# Patient Record
Sex: MALE | Race: WHITE | Employment: OTHER | URBAN - NONMETROPOLITAN AREA
[De-identification: names, ages, dates, MRNs, and addresses within clinical notes are randomized per-mention and may not be internally consistent; named-entity substitution may affect disease eponyms.]

---

## 2018-07-16 ENCOUNTER — OFFICE VISIT (OUTPATIENT)
Dept: UROLOGY | Age: 68
End: 2018-07-16
Payer: MEDICARE

## 2018-07-16 VITALS
SYSTOLIC BLOOD PRESSURE: 135 MMHG | HEART RATE: 89 BPM | WEIGHT: 172 LBS | TEMPERATURE: 98.1 F | BODY MASS INDEX: 24.62 KG/M2 | DIASTOLIC BLOOD PRESSURE: 77 MMHG | HEIGHT: 70 IN

## 2018-07-16 DIAGNOSIS — R97.20 ELEVATED PSA: Primary | ICD-10-CM

## 2018-07-16 LAB
BILIRUBIN, POC: 0
BLOOD URINE, POC: NORMAL
CLARITY, POC: CLEAR
COLOR, POC: YELLOW
GLUCOSE URINE, POC: POSITIVE
KETONES, POC: NORMAL
LEUKOCYTE EST, POC: NORMAL
NITRITE, POC: NORMAL
PH, POC: 5.5
PROTEIN, POC: NORMAL
SPECIFIC GRAVITY, POC: 1.01
UROBILINOGEN, POC: 0.2

## 2018-07-16 PROCEDURE — 4004F PT TOBACCO SCREEN RCVD TLK: CPT | Performed by: UROLOGY

## 2018-07-16 PROCEDURE — 4040F PNEUMOC VAC/ADMIN/RCVD: CPT | Performed by: UROLOGY

## 2018-07-16 PROCEDURE — 81003 URINALYSIS AUTO W/O SCOPE: CPT | Performed by: UROLOGY

## 2018-07-16 PROCEDURE — G8420 CALC BMI NORM PARAMETERS: HCPCS | Performed by: UROLOGY

## 2018-07-16 PROCEDURE — 99999 PR ECHO,TRANSRECTAL: CPT | Performed by: UROLOGY

## 2018-07-16 PROCEDURE — 99204 OFFICE O/P NEW MOD 45 MIN: CPT | Performed by: UROLOGY

## 2018-07-16 PROCEDURE — G8428 CUR MEDS NOT DOCUMENT: HCPCS | Performed by: UROLOGY

## 2018-07-16 PROCEDURE — 1123F ACP DISCUSS/DSCN MKR DOCD: CPT | Performed by: UROLOGY

## 2018-07-16 PROCEDURE — 3017F COLORECTAL CA SCREEN DOC REV: CPT | Performed by: UROLOGY

## 2018-07-16 PROCEDURE — 1101F PT FALLS ASSESS-DOCD LE1/YR: CPT | Performed by: UROLOGY

## 2018-07-16 RX ORDER — LEVOFLOXACIN 500 MG/1
500 TABLET, FILM COATED ORAL DAILY
Qty: 4 TABLET | Refills: 0 | Status: SHIPPED | OUTPATIENT
Start: 2018-07-16 | End: 2018-07-20

## 2018-07-16 RX ORDER — LISINOPRIL 10 MG/1
TABLET ORAL
COMMUNITY

## 2018-07-16 RX ORDER — SYRINGE-NEEDLE,INSULIN,0.5 ML 31 GX5/16"
SYRINGE, EMPTY DISPOSABLE MISCELLANEOUS
Refills: 0 | Status: ON HOLD | COMMUNITY
Start: 2018-05-22 | End: 2018-12-04 | Stop reason: ALTCHOICE

## 2018-07-16 RX ORDER — INSULIN GLARGINE 100 [IU]/ML
INJECTION, SOLUTION SUBCUTANEOUS
COMMUNITY
Start: 2018-05-17 | End: 2018-09-24 | Stop reason: ALTCHOICE

## 2018-07-16 ASSESSMENT — ENCOUNTER SYMPTOMS
BLURRED VISION: 0
SINUS PAIN: 0
EYE PAIN: 0
WHEEZING: 0
BACK PAIN: 0
SHORTNESS OF BREATH: 0
ABDOMINAL PAIN: 0
VOMITING: 0

## 2018-07-16 NOTE — LETTER
[facility]  [address] [place] [address] Drive, [address] [facility] [address]  Phone: [phone]  Fax: [phone]    Danielle Dorman MD        July 16, 2018     [address] [address]      Patient: Lamon Riedel   MR Number: [id number]   YOB: 1950   Date of Visit: 7/16/2018       Dear Provider: Thank you for referring Roge Sr to me for evaluation. Below are the relevant portions of my assessment and plan of care. If you have questions, please do not hesitate to call me. I look forward to following ALVARADO along with you.     Sincerely,        Danielle Dorman MD

## 2018-08-06 ENCOUNTER — HOSPITAL ENCOUNTER (OUTPATIENT)
Age: 68
Setting detail: SPECIMEN
Discharge: HOME | End: 2018-08-06
Payer: MEDICARE

## 2018-08-06 ENCOUNTER — PROCEDURE VISIT (OUTPATIENT)
Dept: UROLOGY | Age: 68
End: 2018-08-06
Payer: MEDICARE

## 2018-08-06 VITALS — BODY MASS INDEX: 24.34 KG/M2 | TEMPERATURE: 96.3 F | WEIGHT: 170 LBS | HEIGHT: 70 IN

## 2018-08-06 DIAGNOSIS — R97.20 ELEVATED PSA: Primary | ICD-10-CM

## 2018-08-06 PROCEDURE — 99999 SURGICAL PATHOLOGY: CPT | Performed by: UROLOGY

## 2018-08-06 PROCEDURE — 99999 PR SONO GUIDE NEEDLE BIOPSY: CPT | Performed by: UROLOGY

## 2018-08-06 PROCEDURE — 76872 US TRANSRECTAL: CPT | Performed by: UROLOGY

## 2018-08-06 PROCEDURE — 96372 THER/PROPH/DIAG INJ SC/IM: CPT | Performed by: UROLOGY

## 2018-08-06 PROCEDURE — 88305 TISSUE EXAM BY PATHOLOGIST: CPT

## 2018-08-06 PROCEDURE — 55700 PR BIOPSY OF PROSTATE,NEEDLE/PUNCH: CPT | Performed by: UROLOGY

## 2018-08-06 PROCEDURE — 99999 PR OFFICE/OUTPT VISIT,PROCEDURE ONLY: CPT | Performed by: UROLOGY

## 2018-08-06 RX ORDER — GENTAMICIN SULFATE 40 MG/ML
80 INJECTION, SOLUTION INTRAMUSCULAR; INTRAVENOUS ONCE
Status: COMPLETED | OUTPATIENT
Start: 2018-08-06 | End: 2018-08-06

## 2018-08-06 RX ADMIN — GENTAMICIN SULFATE 80 MG: 40 INJECTION, SOLUTION INTRAMUSCULAR; INTRAVENOUS at 07:48

## 2018-08-06 NOTE — PROGRESS NOTES
Ramiro Byrd is a 70 y.o. male who presents today   Chief Complaint   Patient presents with    Elevated PSA     im here today for my prostate bx elevated psa          Elevated PSA:  Patient is here today for history of an elevated PSA. HPI  Elevated PSA:  Patient is here today for an elevated PSA. His last several PSA values are as follows:  PSA on 7/7/18 was 7.6  Family History of prostate cancer? no  Recent history of urinary tract infection/prostatitis? no  Recent catheterization? no  Recent acute urinary retention? no  Previous prostate biopsy? no  Lower urinary tract symptoms: No significant lower urinary tract symptoms. He states he's had some prostate checks years ago but doesn't recall ever being told he had an abnormal prostate or enlarged prostate or elevated PSA       PROSTATE U/S AND BIOPSY  As per my instructions, the patient took an antibiotic Beginning 1 day prior to this procedure. To be continued daily until 2 days post biopsy. Gentamicin 80mg IM was given today. He also had a Fleets enema. Surgeon: Jessica Arevalo MD  8/6/18  Indications for exam: Elevated PSA  Anesthesia: 1% Lidocaine periprostatic block bilaterally at junction of base of prostate and seminal vesicle, and apex   Ultrasound Findings:  Seminal Vesicles: intact bilaterally  Prostate Measurement: 22.9 grams PSAD 0.33  Transitional Zone: Normal echogenic structure  Peripheral Zone: Subtle hypoechoic change involving the left base and left mid gland laterally and anterior    Bladder: Minimal postvoid residual  Biopsy: Trans Rectal Ultra Sound was used for Needle Guidance to direct the location of the needle for biopsy. Biopsy cores were taken from the left and right  lobe in a sequential fashion using the standard 12 core template (lateral base; base; lateral mid; mid; lateral apex; apex). Six Biopsy were taken from the right and 6  were taken from the left. All specimens were sent for pathological examination.     Biopsy

## 2018-08-10 ENCOUNTER — TELEPHONE (OUTPATIENT)
Dept: UROLOGY | Age: 68
End: 2018-08-10

## 2018-08-10 DIAGNOSIS — C61 PROSTATE CANCER (HCC): Primary | ICD-10-CM

## 2018-08-10 NOTE — TELEPHONE ENCOUNTER
Attempted to call patient with results of his prostate biopsy. It did show cancer. there is no answer.   I could not leave a voice message on his mailbox

## 2018-08-20 ENCOUNTER — HOSPITAL ENCOUNTER (OUTPATIENT)
Dept: CT IMAGING | Age: 68
Discharge: HOME | End: 2018-08-20
Payer: MEDICARE

## 2018-08-20 ENCOUNTER — HOSPITAL ENCOUNTER (OUTPATIENT)
Dept: NUCLEAR MEDICINE | Age: 68
Discharge: HOME | End: 2018-08-22
Payer: MEDICARE

## 2018-08-20 ENCOUNTER — HOSPITAL ENCOUNTER (OUTPATIENT)
Dept: GENERAL RADIOLOGY | Age: 68
Discharge: HOME | End: 2018-08-20
Payer: MEDICARE

## 2018-08-20 ENCOUNTER — TELEPHONE (OUTPATIENT)
Dept: UROLOGY | Age: 68
End: 2018-08-20

## 2018-08-20 DIAGNOSIS — C61 PROSTATE CANCER (HCC): ICD-10-CM

## 2018-08-20 LAB
ALBUMIN SERPL-MCNC: 4.6 G/DL (ref 3.5–5.2)
ALP BLD-CCNC: 96 U/L (ref 40–130)
ALT SERPL-CCNC: 15 U/L (ref 5–41)
ANION GAP SERPL CALCULATED.3IONS-SCNC: 14 MMOL/L (ref 7–19)
AST SERPL-CCNC: 10 U/L (ref 5–40)
BILIRUB SERPL-MCNC: 0.6 MG/DL (ref 0.2–1.2)
BUN BLDV-MCNC: 11 MG/DL (ref 8–23)
CALCIUM SERPL-MCNC: 10.8 MG/DL (ref 8.8–10.2)
CHLORIDE BLD-SCNC: 95 MMOL/L (ref 98–111)
CO2: 26 MMOL/L (ref 22–29)
CREAT SERPL-MCNC: 0.8 MG/DL (ref 0.5–1.2)
GFR NON-AFRICAN AMERICAN: >60
GFR NON-AFRICAN AMERICAN: >60
GLUCOSE BLD-MCNC: 598 MG/DL (ref 74–109)
PERFORMED ON: NORMAL
POC CREATININE: 0.6 MG/DL (ref 0.3–1.3)
POC SAMPLE TYPE: NORMAL
POTASSIUM SERPL-SCNC: 5.1 MMOL/L (ref 3.5–5)
SODIUM BLD-SCNC: 135 MMOL/L (ref 136–145)
TOTAL PROTEIN: 7.6 G/DL (ref 6.6–8.7)

## 2018-08-20 PROCEDURE — A9561 TC99M OXIDRONATE: HCPCS | Performed by: UROLOGY

## 2018-08-20 PROCEDURE — 74178 CT ABD&PLV WO CNTR FLWD CNTR: CPT

## 2018-08-20 PROCEDURE — 71046 X-RAY EXAM CHEST 2 VIEWS: CPT

## 2018-08-20 PROCEDURE — 82565 ASSAY OF CREATININE: CPT

## 2018-08-20 PROCEDURE — 3430000000 HC RX DIAGNOSTIC RADIOPHARMACEUTICAL: Performed by: UROLOGY

## 2018-08-20 PROCEDURE — 6360000004 HC RX CONTRAST MEDICATION: Performed by: UROLOGY

## 2018-08-20 PROCEDURE — 78306 BONE IMAGING WHOLE BODY: CPT

## 2018-08-20 RX ADMIN — TECHNETIUM TC 99M OXIDRONATE 20 MILLICURIE: 3.15 INJECTION, POWDER, LYOPHILIZED, FOR SOLUTION INTRAVENOUS at 12:16

## 2018-08-20 RX ADMIN — IOPAMIDOL 75 ML: 755 INJECTION, SOLUTION INTRAVENOUS at 09:30

## 2018-08-20 NOTE — TELEPHONE ENCOUNTER
Was instructed by dr Eyad Gill to notify patient of his elevated glucose and the ct findings of thrombus of the left ventricle. Patient was instructed to go to the ED prompt to get his glucose level down. I also notified patients listed pcp of the findings and faxed results to [phone]. Also referred patient to [facility] cardio. Requested stat appt. They scheduled patient for Soonest opening on 9/24/18 at 9am (8:30 arrival) w/ joseline wilkerson. They instructed that we order for patient to do echo to confirm findings.

## 2018-08-22 DIAGNOSIS — I24.0 ACUTE THROMBUS OF LEFT VENTRICLE (HCC): Primary | ICD-10-CM

## 2018-08-24 ENCOUNTER — INITIAL CONSULT (OUTPATIENT)
Dept: UROLOGY | Age: 68
End: 2018-08-24
Payer: MEDICARE

## 2018-08-24 VITALS
SYSTOLIC BLOOD PRESSURE: 136 MMHG | WEIGHT: 168 LBS | DIASTOLIC BLOOD PRESSURE: 70 MMHG | TEMPERATURE: 96.3 F | HEIGHT: 71 IN | BODY MASS INDEX: 23.52 KG/M2 | HEART RATE: 80 BPM

## 2018-08-24 DIAGNOSIS — C61 PROSTATE CANCER (HCC): Primary | ICD-10-CM

## 2018-08-24 DIAGNOSIS — I24.0 LEFT VENTRICULAR APICAL THROMBUS WITHOUT MI (HCC): ICD-10-CM

## 2018-08-24 LAB
BACTERIA URINE, POC: ABNORMAL
BILIRUBIN URINE: 0 MG/DL
BLOOD, URINE: POSITIVE
CASTS URINE, POC: ABNORMAL
CLARITY: CLEAR
COLOR: YELLOW
CRYSTALS URINE, POC: ABNORMAL
EPI CELLS URINE, POC: ABNORMAL
GLUCOSE URINE: POSITIVE
KETONES, URINE: NEGATIVE
LEUKOCYTE EST, POC: ABNORMAL
NITRITE, URINE: NEGATIVE
PH UA: 5.5 (ref 4.5–8)
PROTEIN UA: NEGATIVE
RBC URINE, POC: ABNORMAL
SPECIFIC GRAVITY UA: 1.01 (ref 1–1.03)
UROBILINOGEN, URINE: NORMAL
WBC URINE, POC: ABNORMAL
YEAST URINE, POC: ABNORMAL

## 2018-08-24 PROCEDURE — 3017F COLORECTAL CA SCREEN DOC REV: CPT | Performed by: UROLOGY

## 2018-08-24 PROCEDURE — 4004F PT TOBACCO SCREEN RCVD TLK: CPT | Performed by: UROLOGY

## 2018-08-24 PROCEDURE — G8427 DOCREV CUR MEDS BY ELIG CLIN: HCPCS | Performed by: UROLOGY

## 2018-08-24 PROCEDURE — 4040F PNEUMOC VAC/ADMIN/RCVD: CPT | Performed by: UROLOGY

## 2018-08-24 PROCEDURE — 99215 OFFICE O/P EST HI 40 MIN: CPT | Performed by: UROLOGY

## 2018-08-24 PROCEDURE — 1123F ACP DISCUSS/DSCN MKR DOCD: CPT | Performed by: UROLOGY

## 2018-08-24 PROCEDURE — G8420 CALC BMI NORM PARAMETERS: HCPCS | Performed by: UROLOGY

## 2018-08-24 PROCEDURE — G8599 NO ASA/ANTIPLAT THER USE RNG: HCPCS | Performed by: UROLOGY

## 2018-08-24 PROCEDURE — 1101F PT FALLS ASSESS-DOCD LE1/YR: CPT | Performed by: UROLOGY

## 2018-08-24 PROCEDURE — 81001 URINALYSIS AUTO W/SCOPE: CPT | Performed by: UROLOGY

## 2018-08-24 ASSESSMENT — ENCOUNTER SYMPTOMS
EYE PAIN: 0
WHEEZING: 0
ABDOMINAL PAIN: 0
VOMITING: 0
SHORTNESS OF BREATH: 0
BLURRED VISION: 0
SINUS PAIN: 0
BACK PAIN: 0

## 2018-08-24 NOTE — PROGRESS NOTES
Deon Lopez is a 70 y.o. male who presents today   Chief Complaint   Patient presents with    Prostate Cancer     im here today for my prostate  cancer consult             Prostate Cancer:  Patient is here today for prostate cancer which was first diagnosed on 08/06/16. His last several PSA values are as follows:  PSA was 7.6 ng/ML done on 07/07/18  His prostate volume was 22.9 grams. , PSAD 0.33  He had a prostate biopsy consisting of a total of 12 cores with 5 positive cores. He has bilateral disease with a Sheba score of 4+4 = 8 in only one core. The majority were 3+3 = 6 with 1 core being a 3+4 = 7 . Location of the the Cancer: Right lateral base, right lateral mid, right mid, right base, and left apex  His clinical TNM stage was: T1c  His pathological TNM stage was: T2c  The patient has a staging CT and bone scan. It showed no distant metastasis  Previous treatment of prostate cancer: none  Patient has Normal erectile function no            Prostate Cancer Treatment Options  I have discussed in great detail with the patient the natural history, diagnosis and treatment of prostate cancer. I explained the [place] grading system, Staging system, correlation of disease with PSA levels, and  as well as the various treatments available for prostate cancer. I discussed the following options:  1. Watchful waiting / Active surveillance. I told that this approach was appropriate for older patients, patients with a life expectancy of 10 years or less and patients with low grade, low volume disease. This approach will require serial ALICJA's, PSA's and possibly repeat prostate biopsy to check for grade or stage progression. 2.  Hormonal Therapy. I discussed the role of hormonal therapy in the form of LHRH agonists or antagonists such as Lupron, etc. Or surgical castration in the form of bilateral orchiectomy.   The patient was told that this is primarily used in patients with teresa or metastatic disease or in prostatic  parenchyma. D.  Prostate, right base needle biopsy: Prostatic adenocarcinoma  (Sheba's grade 3+4 = 7), measuring 0.3 cm in greatest dimension and  occupying approximately 25% of the evaluated core. E.  Prostate, right mid needle biopsy: Prostatic adenocarcinoma  ([place]'s grade 4+4 equal 8), measuring 0.3 cm in greatest dimension and  occupying approximately 25% of the evaluated core. F.  Prostate, right [place] needle biopsy: Benign prostatic parenchyma. G.  Prostate, left lateral base needle biopsy: Benign prostatic  parenchyma. H.  Prostate, left lateral mid needle biopsy: Benign prostatic  parenchyma. I.  Prostate, left lateral apex needle biopsy: Benign prostatic  parenchyma. J.  Prostate, left base needle biopsy: Benign prostatic parenchyma. K.  Prostate, left mid needle biopsy: Benign prostatic parenchyma. L.  Prostate, left apex needle biopsy: Prostatic adenocarcinoma  (Sheba's grade 3+3 equal 6), measuring 0.1 cm in greatest dimension and  occupying less than 5% of the evaluated core. PROSTATE GLAND:  Needle biopsy    TUMOR    Histologic Grade    Sheba Pattern    [place] pattern (specify)    Primary (Predominant) Pattern:  Grade 3    Secondary (Worst Remaining) Pattern:  Grade 4  Total Sheba Score:  7  EXTENT    Tumor Quantitation    Number of Cores Positive:  5    Total Number of Cores:  12    Proportion (%) of prostatic tissue involved by tumor:  20  Periprostatic Fat Invasion:  Not identified  Seminal Vesicle Invasion:  Not identified  ACCESSORY FINDINGS  ADDITIONAL NON TUMOR FINDINGS       CBG/CBG    PREOP DIAGNOSIS:  R97.20 ELEVATED PSA  PSA 7.6       IMAGING:   Bone scan done on 08/20/18 showed no evidence of osteoblastic metastasis. Chest x-ray done on 08/20/18 showed no acute cardiopulmonary process or metastasis. Abdominal pelvic CT scan done on 08/20/18 shows no retroperitoneal or pelvic adenopathy or evidence of any abdominal metastasis.  However he does have calcific density in the tail of pancreas he has a prior history of prior pancreatitis. In addition he was found to have a hypodense area in the left ventricle appears to be consistent with a thrombus. 1. Prostate cancer ([address]  Appears to be ordering confined. However he does have high risk disease because one core showed Sheba's 4+4 = 8. No evidence of distant metastasis. I spent 1 hour face-to-face time with him and his family his daughter and wife. We discussed options for management for clinically localized prostate cancer. We discussed the results of his biopsy the pathology report and results of his imaging studies. He is leaning to have surgery however we need to find out what his echo shows regarding this left ventricular apical thrombus. In addition he has poor control of his diabetes he says generally check his blood sugars at home and I have encouraged him to see his PCP to see if they can do further adjustments on his insulin and his medications to get better control his sugars were prior to considering surgery. We also compared contrast opened versus robotic surgery and radiation with or without hormonal therapy. All questions and concerns were asked and answered. - POCT Urinalysis Dipstick w/ Micro (Auto)    2. 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 We'll try to see if he needed an earlier if the echo confirms the CT findings. Specially since he's considering surgery      Orders Placed This Encounter   Procedures    POCT Urinalysis Dipstick w/ Micro (Auto)        Return for [facility] call patient with results.

## 2018-08-24 NOTE — LETTER
[facility]  [address] [place] [address] Drive, [address] [id number]  Phone: [phone]  Fax: [phone]    Mehdi Kim MD        August 24, 2018     [address] [facility]      Patient: Leonie Pratt   MR Number: [id number]   YOB: 1950   Date of Visit: 8/24/2018       Dear Provider: Thank you for referring Waldemar Stearns to me for evaluation. Below are the relevant portions of my assessment and plan of care. If you have questions, please do not hesitate to call me. I look forward to following Alyse Welsh along with you.     Sincerely,        Mehdi Kim MD

## 2018-08-28 ENCOUNTER — HOSPITAL ENCOUNTER (OUTPATIENT)
Dept: NON INVASIVE DIAGNOSTICS | Age: 68
Discharge: HOME | End: 2018-08-28
Payer: MEDICARE

## 2018-08-28 DIAGNOSIS — I24.0 ACUTE THROMBUS OF LEFT VENTRICLE (HCC): ICD-10-CM

## 2018-08-28 LAB
LV EF: 38 %
LVEF MODALITY: NORMAL

## 2018-08-28 PROCEDURE — 93306 TTE W/DOPPLER COMPLETE: CPT

## 2018-08-30 ENCOUNTER — TELEPHONE (OUTPATIENT)
Dept: UROLOGY | Age: 68
End: 2018-08-30

## 2018-08-30 NOTE — TELEPHONE ENCOUNTER
Scheduled patient w/ joseline wilkreson for thrombus of the left ventricle. Dr Bradley Dobson requested sooner appointment, but we were asked to order echo first to confirm findings. Echo does not show thrombus of the left ventricle, but dr Bradley Dobson is still requesting patient be seen sooner to be cleared for surgery.

## 2018-09-06 NOTE — TELEPHONE ENCOUNTER
Called patient to see about moving his cardio appt up. He said he does not want to move it up at this time. He will keep it as scheduled. He has been seeing his pcp to try to get his blood sugar under control and that is all he is concerned about at this time. Is in no hurry for surgery. Will get in touch with us after his appt w/ cardio to make appt with dr Andrew Mathews.

## 2018-09-10 ENCOUNTER — TELEPHONE (OUTPATIENT)
Dept: CARDIOLOGY | Age: 68
End: 2018-09-10

## 2018-09-24 ENCOUNTER — OFFICE VISIT (OUTPATIENT)
Dept: CARDIOLOGY | Age: 68
End: 2018-09-24
Payer: MEDICARE

## 2018-09-24 VITALS
WEIGHT: 166 LBS | SYSTOLIC BLOOD PRESSURE: 118 MMHG | RESPIRATION RATE: 18 BRPM | DIASTOLIC BLOOD PRESSURE: 76 MMHG | HEIGHT: 70 IN | HEART RATE: 80 BPM | BODY MASS INDEX: 23.77 KG/M2

## 2018-09-24 DIAGNOSIS — Z01.818 PREOPERATIVE CLEARANCE: ICD-10-CM

## 2018-09-24 DIAGNOSIS — I51.89 DIASTOLIC DYSFUNCTION: ICD-10-CM

## 2018-09-24 DIAGNOSIS — R06.09 DOE (DYSPNEA ON EXERTION): ICD-10-CM

## 2018-09-24 DIAGNOSIS — I51.9 LEFT VENTRICULAR DYSFUNCTION: Primary | ICD-10-CM

## 2018-09-24 DIAGNOSIS — Z86.39 HISTORY OF DIABETES MELLITUS: ICD-10-CM

## 2018-09-24 DIAGNOSIS — I10 ESSENTIAL HYPERTENSION: ICD-10-CM

## 2018-09-24 PROCEDURE — 4040F PNEUMOC VAC/ADMIN/RCVD: CPT | Performed by: NURSE PRACTITIONER

## 2018-09-24 PROCEDURE — 1123F ACP DISCUSS/DSCN MKR DOCD: CPT | Performed by: NURSE PRACTITIONER

## 2018-09-24 PROCEDURE — 93000 ELECTROCARDIOGRAM COMPLETE: CPT | Performed by: NURSE PRACTITIONER

## 2018-09-24 PROCEDURE — G8599 NO ASA/ANTIPLAT THER USE RNG: HCPCS | Performed by: NURSE PRACTITIONER

## 2018-09-24 PROCEDURE — 1101F PT FALLS ASSESS-DOCD LE1/YR: CPT | Performed by: NURSE PRACTITIONER

## 2018-09-24 PROCEDURE — 4004F PT TOBACCO SCREEN RCVD TLK: CPT | Performed by: NURSE PRACTITIONER

## 2018-09-24 PROCEDURE — G8427 DOCREV CUR MEDS BY ELIG CLIN: HCPCS | Performed by: NURSE PRACTITIONER

## 2018-09-24 PROCEDURE — 3017F COLORECTAL CA SCREEN DOC REV: CPT | Performed by: NURSE PRACTITIONER

## 2018-09-24 PROCEDURE — G8420 CALC BMI NORM PARAMETERS: HCPCS | Performed by: NURSE PRACTITIONER

## 2018-09-24 PROCEDURE — 99204 OFFICE O/P NEW MOD 45 MIN: CPT | Performed by: NURSE PRACTITIONER

## 2018-09-24 RX ORDER — GLIPIZIDE 5 MG/1
10 TABLET ORAL DAILY
Status: ON HOLD | COMMUNITY
Start: 2018-09-14 | End: 2021-01-01 | Stop reason: HOSPADM

## 2018-09-24 NOTE — PATIENT INSTRUCTIONS
minutes. Your blood pressure will also be monitored throughout the exercise portion. [place] through the exercise portion, a second round of radioactive tracer is injected into your body. Your heart rate and EKG will be monitored for another few minutes after administering the drug. Test Preparation:     Bring a list of your current medications. Do not take any of your medications the morning of the test, but bring all morning medications with you as you will take them after the stress portion of the test is completed.  No caffeine 12 hours prior to the testing. This includes: coffee, pop/soda, chocolate, cold medications, etc.  Any product that might contain caffeine.  No nicotine or alcohol 12 hours prior to your test.    Nothing to eat or drink 4-6 hours prior to appointment time. It is okay to drink small amounts of water during the four hours prior to the test.   Nitroglycerin patches must be taken off 1 hour before testing.  Wear comfortable clothing.  Please refrain from any strenuous exercise or activities the day before your test, or the day of your test.   The Nuclear Lexiscan Stress test takes about 2 ½ to 3 hours to complete. If for any reason you are unable to keep this appointment, please contact Outpatient Scheduling, [phone], as soon as possible to reschedule. What is a [facility]? Gilmer Mace may be ordered by for those unable to exercise enough to increase blood flow to their heart during an exercise stress test.  [facility] is used to help produce images of the heart for diagnosing blocked heart arteries. This test is not invasive. You will be awake during the entire test.    Your heart rhythm, blood pressure and oxygen level will be monitored during the test.  A small catheter will be placed in an arm vein. [facility] is injected through the catheter into your bloodstream for 10 seconds followed immediately by saline solution to clear the line.    [facility] doctor recommends aspirin, take the amount directed each day. Make sure you take aspirin and not another kind of pain reliever, such as acetaminophen (Tylenol). If you take ibuprofen (such as Advil or Motrin) for other problems, take aspirin at least 2 hours before taking ibuprofen. When should you call for help? Call 911 if you have symptoms of a heart attack. These may include:    · Chest pain or pressure, or a strange feeling in the chest.     · Sweating.     · Shortness of breath.     · Pain, pressure, or a strange feeling in the back, neck, jaw, or upper belly or in one or both shoulders or arms.     · Lightheadedness or sudden weakness.     · A fast or irregular heartbeat.    After you call 911, the  may tell you to chew 1 adult-strength or 2 to 4 low-dose aspirin. Wait for an ambulance. Do not try to drive yourself.   Watch closely for changes in your health, and be sure to contact your doctor if you have any problems. Where can you learn more? Go to [email or web address] org and sign in to your Ins[employer] account. Enter Z843 in the [facility] box to learn more about \"A Healthy Heart: Care Instructions. \"     If you do not have an account, please click on the \"Sign Up Now\" link. Current as of: December 6, 2017  Content Version: 11.7  © 9200-1212 [employer]. Care instructions adapted under license by [facility] [employer] [facility] ([facility]). If you have questions about a medical condition or this instruction, always ask your healthcare professional. [address] [place] [address] any warranty or liability for your use of this information.

## 2018-09-24 NOTE — PROGRESS NOTES
Normal     rbc urine, poc 0-1`     wbc urine, poc      bacteria urine, poc      yeast urine, poc      casts urine, poc      epi cells urine, poc      crystals urine, poc       Plan  Previous cardiac history and records reviewed with Dr. Braeden Jara. Continue current medications as prescribed. Lexiscan rx to be scheduled. Continue to follow up with primary care provider for non cardiac medical problems. Call the office with any problems, questions or concerns at [phone]. Follow up as scheduled with your cardiologist - Dr. Braeden Jara. The following educational material has been included in this after visit summary for your review: heart health.     Additional instructions:  Coronary artery disease risk factors you can control: Smoking, high blood pressure, high cholesterol, diabetes, being overweight, lack of exercise and stress. Continue heart healthy diet. Take medications as directed. Exercise as tolerated. Strive for 15 minutes of exercise most days of the week. If asked to keep a blood pressure log, do so for 2 weeks. Call the office to report readings at [phone]. Blood pressure goal is 140/90 or less. If you are a diabetic, the goal is 130/80 or less. If you are taking cholesterol lowering medications, it is recommended that lab work be checked annually. Always keep a current medication list. Bring your medications to every office visit.      Lexiscan Nuclear Stress Test   Date/Time: to be scheduled. [place] at the [facility] and [address] [place] [address] located on the first floor of  [facility] through hospital main entrance and turn immediately to your left. Test Description:  There are two parts to a Lexiscan stress test: the rest portion and the exercise portion. For the rest portion, a radioactive tracer is injected into your arm through the IV. After 30 to 60 minutes, the process of imaging will begin.   A nuclear camera will be placed on your chest area and images are taken for the next 15 to 20 minutes. For the exercise portion, a nurse will attach EKG electrodes to your chest to monitor your heart rate. The drug Darral Ducking is administered to simulate stress on the heart. Your heart rhythm will then be monitored for the next few minutes. Your blood pressure will also be monitored throughout the exercise portion. [place] through the exercise portion, a second round of radioactive tracer is injected into your body. Your heart rate and EKG will be monitored for another few minutes after administering the drug. Test Preparation:     Bring a list of your current medications. Do not take any of your medications the morning of the test, but bring all morning medications with you as you will take them after the stress portion of the test is completed.  No caffeine 12 hours prior to the testing. This includes: coffee, pop/soda, chocolate, cold medications, etc.  Any product that might contain caffeine.  No nicotine or alcohol 12 hours prior to your test.    Nothing to eat or drink 4-6 hours prior to appointment time. It is okay to drink small amounts of water during the four hours prior to the test.   Nitroglycerin patches must be taken off 1 hour before testing.  Wear comfortable clothing.  Please refrain from any strenuous exercise or activities the day before your test, or the day of your test.   The Nuclear Lexiscan Stress test takes about 2 ½ to 3 hours to complete. If for any reason you are unable to keep this appointment, please contact Outpatient Scheduling, [phone], as soon as possible to reschedule. What is a Darral Ducking? Kathya Brewer may be ordered by for those unable to exercise enough to increase blood flow to their heart during an exercise stress test.  Darral Ducking is used to help produce images of the heart for diagnosing blocked heart arteries. This test is not invasive.   You will be awake during the entire test.    Your heart rhythm, blood pressure and oxygen level will be monitored during the test.  A small catheter will be placed in an arm vein. Blanca Jean Carlos is injected through the catheter into your bloodstream for 10 seconds followed immediately by saline solution to clear the line. Blanca Jean Carlos dilates the heart arteries to allow increased blood flow to the heart. 10-20 seconds after the saline solution, a small dose of radioactive isotope imaging tracer is injected into the catheter. After the tracer is absorbed in your system and distributed to the heart arteries, a special camera will take detailed pictures of your heart. The pictures will show how well the blood flows into your heart and if there are any areas of blockage. While you lie on your back with your arms above your head, the camera will take pictures for about 20-40 minutes.     One set of images will be taken when the Blanca Jean Carlos is active in your system, and a second set of images will be taken when you're considered at rest.         LO Blackman

## 2018-10-05 ENCOUNTER — TELEPHONE (OUTPATIENT)
Dept: CARDIOLOGY | Age: 68
End: 2018-10-05

## 2018-10-24 PROBLEM — Z01.818 PREOPERATIVE CLEARANCE: Status: RESOLVED | Noted: 2018-09-24 | Resolved: 2018-10-24

## 2018-11-01 ENCOUNTER — OFFICE VISIT (OUTPATIENT)
Dept: CARDIOLOGY | Age: 68
End: 2018-11-01
Payer: MEDICARE

## 2018-11-01 VITALS
HEART RATE: 81 BPM | WEIGHT: 167 LBS | HEIGHT: 71 IN | DIASTOLIC BLOOD PRESSURE: 80 MMHG | BODY MASS INDEX: 23.38 KG/M2 | SYSTOLIC BLOOD PRESSURE: 128 MMHG

## 2018-11-01 DIAGNOSIS — R06.09 DOE (DYSPNEA ON EXERTION): ICD-10-CM

## 2018-11-01 DIAGNOSIS — I10 ESSENTIAL HYPERTENSION: Primary | ICD-10-CM

## 2018-11-01 DIAGNOSIS — I51.9 LEFT VENTRICULAR DYSFUNCTION: ICD-10-CM

## 2018-11-01 PROCEDURE — 93000 ELECTROCARDIOGRAM COMPLETE: CPT | Performed by: INTERNAL MEDICINE

## 2018-11-01 PROCEDURE — 99214 OFFICE O/P EST MOD 30 MIN: CPT | Performed by: INTERNAL MEDICINE

## 2018-11-01 ASSESSMENT — ENCOUNTER SYMPTOMS
SHORTNESS OF BREATH: 0
EYES NEGATIVE: 1
VOMITING: 0
RESPIRATORY NEGATIVE: 1
DIARRHEA: 0
GASTROINTESTINAL NEGATIVE: 1
NAUSEA: 0

## 2018-11-02 ENCOUNTER — TELEPHONE (OUTPATIENT)
Dept: CARDIOLOGY | Age: 68
End: 2018-11-02

## 2018-11-20 ENCOUNTER — HOSPITAL ENCOUNTER (OUTPATIENT)
Dept: NUCLEAR MEDICINE | Age: 68
Discharge: HOME | End: 2018-11-22
Payer: MEDICARE

## 2018-11-20 ENCOUNTER — HOSPITAL ENCOUNTER (OUTPATIENT)
Dept: NON INVASIVE DIAGNOSTICS | Age: 68
Discharge: HOME | End: 2018-11-20
Payer: MEDICARE

## 2018-11-20 DIAGNOSIS — R06.09 DOE (DYSPNEA ON EXERTION): ICD-10-CM

## 2018-11-20 DIAGNOSIS — I10 ESSENTIAL HYPERTENSION: ICD-10-CM

## 2018-11-20 DIAGNOSIS — Z01.818 PREOPERATIVE CLEARANCE: ICD-10-CM

## 2018-11-20 DIAGNOSIS — I51.9 LEFT VENTRICULAR DYSFUNCTION: ICD-10-CM

## 2018-11-20 PROCEDURE — 6360000002 HC RX W HCPCS: Performed by: CLINICAL NURSE SPECIALIST

## 2018-11-20 PROCEDURE — A9500 TC99M SESTAMIBI: HCPCS | Performed by: CLINICAL NURSE SPECIALIST

## 2018-11-20 PROCEDURE — 93017 CV STRESS TEST TRACING ONLY: CPT

## 2018-11-20 PROCEDURE — 78452 HT MUSCLE IMAGE SPECT MULT: CPT

## 2018-11-20 PROCEDURE — 3430000000 HC RX DIAGNOSTIC RADIOPHARMACEUTICAL: Performed by: CLINICAL NURSE SPECIALIST

## 2018-11-20 RX ADMIN — REGADENOSON 0.4 MG: 0.08 INJECTION, SOLUTION INTRAVENOUS at 09:47

## 2018-11-20 RX ADMIN — TETRAKIS(2-METHOXYISOBUTYLISOCYANIDE)COPPER(I) TETRAFLUOROBORATE 10 MILLICURIE: 1 INJECTION, POWDER, LYOPHILIZED, FOR SOLUTION INTRAVENOUS at 09:48

## 2018-11-20 RX ADMIN — TETRAKIS(2-METHOXYISOBUTYLISOCYANIDE)COPPER(I) TETRAFLUOROBORATE 30 MILLICURIE: 1 INJECTION, POWDER, LYOPHILIZED, FOR SOLUTION INTRAVENOUS at 09:48

## 2018-11-21 LAB
LV EF: 30 %
LVEF MODALITY: NORMAL

## 2018-11-26 ENCOUNTER — HOSPITAL ENCOUNTER (OUTPATIENT)
Dept: GENERAL RADIOLOGY | Age: 68
Discharge: HOME | End: 2018-11-26
Payer: MEDICARE

## 2018-11-26 ENCOUNTER — HOSPITAL ENCOUNTER (OUTPATIENT)
Dept: LAB | Age: 68
Discharge: HOME | End: 2018-11-26
Payer: MEDICARE

## 2018-11-26 ENCOUNTER — OFFICE VISIT (OUTPATIENT)
Dept: CARDIOLOGY | Age: 68
End: 2018-11-26
Payer: MEDICARE

## 2018-11-26 VITALS
DIASTOLIC BLOOD PRESSURE: 72 MMHG | BODY MASS INDEX: 24.2 KG/M2 | HEART RATE: 80 BPM | WEIGHT: 169 LBS | SYSTOLIC BLOOD PRESSURE: 110 MMHG | HEIGHT: 70 IN

## 2018-11-26 DIAGNOSIS — I10 ESSENTIAL HYPERTENSION: ICD-10-CM

## 2018-11-26 DIAGNOSIS — R94.39 ABNORMAL NUCLEAR STRESS TEST: Primary | ICD-10-CM

## 2018-11-26 DIAGNOSIS — I51.9 LEFT VENTRICULAR DYSFUNCTION: ICD-10-CM

## 2018-11-26 DIAGNOSIS — R94.39 ABNORMAL NUCLEAR STRESS TEST: ICD-10-CM

## 2018-11-26 DIAGNOSIS — R06.09 DOE (DYSPNEA ON EXERTION): ICD-10-CM

## 2018-11-26 LAB
ANION GAP SERPL CALCULATED.3IONS-SCNC: 12 MMOL/L (ref 7–19)
BASOPHILS ABSOLUTE: 0.1 K/UL (ref 0–0.2)
BASOPHILS RELATIVE PERCENT: 0.9 % (ref 0–1)
BUN BLDV-MCNC: 7 MG/DL (ref 8–23)
CALCIUM SERPL-MCNC: 9.1 MG/DL (ref 8.8–10.2)
CHLORIDE BLD-SCNC: 105 MMOL/L (ref 98–111)
CO2: 27 MMOL/L (ref 22–29)
CREAT SERPL-MCNC: 0.6 MG/DL (ref 0.5–1.2)
EOSINOPHILS ABSOLUTE: 0.1 K/UL (ref 0–0.6)
EOSINOPHILS RELATIVE PERCENT: 2 % (ref 0–5)
GFR NON-AFRICAN AMERICAN: >60
GLUCOSE BLD-MCNC: 266 MG/DL (ref 74–109)
HCT VFR BLD CALC: 41.8 % (ref 42–52)
HEMOGLOBIN: 14.6 G/DL (ref 14–18)
LYMPHOCYTES ABSOLUTE: 2.3 K/UL (ref 1.1–4.5)
LYMPHOCYTES RELATIVE PERCENT: 41.2 % (ref 20–40)
MCH RBC QN AUTO: 31.2 PG (ref 27–31)
MCHC RBC AUTO-ENTMCNC: 34.9 G/DL (ref 33–37)
MCV RBC AUTO: 89.3 FL (ref 80–94)
MONOCYTES ABSOLUTE: 0.4 K/UL (ref 0–0.9)
MONOCYTES RELATIVE PERCENT: 6.3 % (ref 0–10)
NEUTROPHILS ABSOLUTE: 2.7 K/UL (ref 1.5–7.5)
NEUTROPHILS RELATIVE PERCENT: 49.4 % (ref 50–65)
PDW BLD-RTO: 11.9 % (ref 11.5–14.5)
PLATELET # BLD: 218 K/UL (ref 130–400)
PMV BLD AUTO: 9.6 FL (ref 9.4–12.4)
POTASSIUM SERPL-SCNC: 4.2 MMOL/L (ref 3.5–5)
RBC # BLD: 4.68 M/UL (ref 4.7–6.1)
SODIUM BLD-SCNC: 144 MMOL/L (ref 136–145)
WBC # BLD: 5.5 K/UL (ref 4.8–10.8)

## 2018-11-26 PROCEDURE — 71046 X-RAY EXAM CHEST 2 VIEWS: CPT

## 2018-11-26 PROCEDURE — 99214 OFFICE O/P EST MOD 30 MIN: CPT | Performed by: INTERNAL MEDICINE

## 2018-11-26 ASSESSMENT — ENCOUNTER SYMPTOMS
NAUSEA: 0
SHORTNESS OF BREATH: 0
EYES NEGATIVE: 1
DIARRHEA: 0
GASTROINTESTINAL NEGATIVE: 1
VOMITING: 0
RESPIRATORY NEGATIVE: 1

## 2018-11-26 NOTE — PROGRESS NOTES
TABLET BY MOUTH TWO (2) TIMES A DAY  2    glipiZIDE (GLUCOTROL) 5 MG tablet       lisinopril (PRINIVIL;ZESTRIL) 10 MG tablet lisinopril 10 mg tablet      EASY TOUCH INSULIN SYRINGE 31G X 5/16\" 0.5 ML MISC USE AS DIRECTED WITH INSULIN PER PHYSICIAN. MUST SEE DR FOR MORE REFILLS  0     No current facility-administered medications for this visit. Social History     Social History    Marital status:      Spouse name: N/A    Number of children: N/A    Years of education: N/A     Occupational History    Not on file. Social History Main Topics    Smoking status: Never Smoker    Smokeless tobacco: Current User     Types: Chew    Alcohol use Yes      Comment: occ    Drug use: No    Sexual activity: Not on file     Other Topics Concern    Not on file     Social History Narrative    No narrative on file       Physical Examination:  /72   Pulse 80   Ht 5' 10\" (1.778 m)   Wt 169 lb (76.7 kg)   BMI 24.25 kg/m²   Physical Exam   Constitutional: He appears well-developed and well-nourished. Neck: No JVD present. Carotid bruit is not present. Cardiovascular: Normal rate, regular rhythm and normal heart sounds. Exam reveals no gallop and no friction rub. No murmur heard. Pulmonary/Chest: Effort normal and breath sounds normal. No respiratory distress. He has no wheezes. He has no rales. Abdominal: He exhibits no distension. There is no tenderness. Musculoskeletal: He exhibits no edema. Lymphadenopathy:     He has no cervical adenopathy. Skin: Skin is warm and dry. ASSESSMENT:     Diagnosis Orders   1. Abnormal nuclear stress test  Basic Metabolic Panel    CBC    XR CHEST STANDARD (2 VW)   2. Left ventricular dysfunction  Basic Metabolic Panel    CBC    XR CHEST STANDARD (2 VW)   3. Essential hypertension  Basic Metabolic Panel    CBC    XR CHEST STANDARD (2 VW)   4.  ESPINOSA (dyspnea on exertion)  Basic Metabolic Panel    CBC    XR CHEST STANDARD (2 VW)       PLAN:  No orders of the defined types were placed in this encounter. No orders of the defined types were placed in this encounter. 1. Continue present medications  I have discussed with the patient regarding indications for the proposed procedure LEFT HEART CATHETERIZATION AND POSSIBLE PERCUTANEOUS INTERVENTION  along with possible alternatives benefits and risks including but not limited to risks of death, myocardial infarction, stroke, contrast induced nephropathy which in some cases may lead to acute kidney failure requiring dialysis, allergic reactions, bleeding requiring blood transfusion,  cardiac arrhthymias, respiratory failure which may require placing the patient on respiratory support such as a ventilator or breathing machine,risk of complications which may require vascular surgery, and if coronary intervention is performed emergency CABG may be required in less than 1% of cases. The patient is awake and alert and understands the issues involved and indicates willingness to proceed as ordered. The patient does not have any contraindications to dual antiplatelet therapy. The patient does not have any known  pending surgical procedures in the next 12 months at this time. The patient is  a reasonable candidate for moderate conscious sedation. ASA score:  ASA 3 - Patient with moderate systemic disease with functional limitations    Mallampati: I (soft palate, uvula, fauces, tonsillar pillars visible)    Preferred vascular access site will be: right radial      Return in about 4 weeks (around 12/24/2018). Delaney Johnston MD 11/26/2018 1:46 PM    [facility]      Thisdictation was generated by voice recognition computer software. Although all attempts are made to edit the dictation for accuracy, there may be errors in the transcription that are not intended.

## 2018-11-27 ENCOUNTER — TELEPHONE (OUTPATIENT)
Dept: UROLOGY | Age: 68
End: 2018-11-27

## 2018-11-27 NOTE — TELEPHONE ENCOUNTER
Pt had lexiscan and now needs Heart Cath. Call placed to Dr Kristy Mclean office and spoke with Beth Ramírez. Let them know that pt would be having heart cath then followup with Dr Melody Espino on 12/31 at which time hopefully we will get clearance.

## 2018-12-04 ENCOUNTER — HOSPITAL ENCOUNTER (OUTPATIENT)
Dept: CARDIAC CATH/INVASIVE PROCEDURES | Age: 68
Discharge: HOME | End: 2018-12-04
Attending: INTERNAL MEDICINE | Admitting: INTERNAL MEDICINE
Payer: MEDICARE

## 2018-12-04 VITALS
WEIGHT: 168 LBS | HEART RATE: 80 BPM | TEMPERATURE: 97.8 F | SYSTOLIC BLOOD PRESSURE: 141 MMHG | OXYGEN SATURATION: 98 % | DIASTOLIC BLOOD PRESSURE: 72 MMHG | BODY MASS INDEX: 23.52 KG/M2 | RESPIRATION RATE: 15 BRPM | HEIGHT: 71 IN

## 2018-12-04 LAB
ALBUMIN SERPL-MCNC: 3.7 G/DL (ref 3.5–5.2)
ALP BLD-CCNC: 63 U/L (ref 40–130)
ALT SERPL-CCNC: 14 U/L (ref 5–41)
ANION GAP SERPL CALCULATED.3IONS-SCNC: 11 MMOL/L (ref 7–19)
AST SERPL-CCNC: 10 U/L (ref 5–40)
BILIRUB SERPL-MCNC: 0.5 MG/DL (ref 0.2–1.2)
BUN BLDV-MCNC: 6 MG/DL (ref 8–23)
CALCIUM SERPL-MCNC: 8.6 MG/DL (ref 8.8–10.2)
CHLORIDE BLD-SCNC: 102 MMOL/L (ref 98–111)
CHOLESTEROL, TOTAL: 171 MG/DL (ref 160–199)
CO2: 25 MMOL/L (ref 22–29)
CREAT SERPL-MCNC: 0.6 MG/DL (ref 0.5–1.2)
EKG P AXIS: -26 DEGREES
EKG P-R INTERVAL: 178 MS
EKG Q-T INTERVAL: 434 MS
EKG QRS DURATION: 100 MS
EKG QTC CALCULATION (BAZETT): 446 MS
EKG T AXIS: 81 DEGREES
GFR NON-AFRICAN AMERICAN: >60
GLUCOSE BLD-MCNC: 263 MG/DL (ref 74–109)
HCT VFR BLD CALC: 37.2 % (ref 42–52)
HDLC SERPL-MCNC: 40 MG/DL (ref 55–121)
HEMOGLOBIN: 13 G/DL (ref 14–18)
LDL CHOLESTEROL CALCULATED: 115 MG/DL
MAGNESIUM: 2 MG/DL (ref 1.6–2.4)
MCH RBC QN AUTO: 31 PG (ref 27–31)
MCHC RBC AUTO-ENTMCNC: 34.9 G/DL (ref 33–37)
MCV RBC AUTO: 88.6 FL (ref 80–94)
PDW BLD-RTO: 12 % (ref 11.5–14.5)
PLATELET # BLD: 182 K/UL (ref 130–400)
PMV BLD AUTO: 9.6 FL (ref 9.4–12.4)
POTASSIUM REFLEX MAGNESIUM: 3.5 MMOL/L (ref 3.5–5)
RBC # BLD: 4.2 M/UL (ref 4.7–6.1)
SODIUM BLD-SCNC: 138 MMOL/L (ref 136–145)
TOTAL PROTEIN: 6.1 G/DL (ref 6.6–8.7)
TRIGL SERPL-MCNC: 79 MG/DL (ref 0–149)
WBC # BLD: 5.7 K/UL (ref 4.8–10.8)

## 2018-12-04 PROCEDURE — 36415 COLL VENOUS BLD VENIPUNCTURE: CPT

## 2018-12-04 PROCEDURE — 6370000000 HC RX 637 (ALT 250 FOR IP): Performed by: INTERNAL MEDICINE

## 2018-12-04 PROCEDURE — C1769 GUIDE WIRE: HCPCS

## 2018-12-04 PROCEDURE — 2500000003 HC RX 250 WO HCPCS

## 2018-12-04 PROCEDURE — 80053 COMPREHEN METABOLIC PANEL: CPT

## 2018-12-04 PROCEDURE — 85027 COMPLETE CBC AUTOMATED: CPT

## 2018-12-04 PROCEDURE — 93005 ELECTROCARDIOGRAM TRACING: CPT

## 2018-12-04 PROCEDURE — 6360000002 HC RX W HCPCS

## 2018-12-04 PROCEDURE — 2580000003 HC RX 258: Performed by: INTERNAL MEDICINE

## 2018-12-04 PROCEDURE — 93458 L HRT ARTERY/VENTRICLE ANGIO: CPT | Performed by: INTERNAL MEDICINE

## 2018-12-04 PROCEDURE — 2709999900 HC NON-CHARGEABLE SUPPLY

## 2018-12-04 PROCEDURE — 93458 L HRT ARTERY/VENTRICLE ANGIO: CPT

## 2018-12-04 PROCEDURE — 99152 MOD SED SAME PHYS/QHP 5/>YRS: CPT

## 2018-12-04 PROCEDURE — 80061 LIPID PANEL: CPT

## 2018-12-04 PROCEDURE — 83735 ASSAY OF MAGNESIUM: CPT

## 2018-12-04 PROCEDURE — C1894 INTRO/SHEATH, NON-LASER: HCPCS

## 2018-12-04 RX ORDER — SODIUM CHLORIDE 0.9 % (FLUSH) 0.9 %
10 SYRINGE (ML) INJECTION EVERY 12 HOURS SCHEDULED
Status: DISCONTINUED | OUTPATIENT
Start: 2018-12-04 | End: 2018-12-04 | Stop reason: HOSPADM

## 2018-12-04 RX ORDER — ACETAMINOPHEN 325 MG/1
650 TABLET ORAL EVERY 4 HOURS PRN
Status: DISCONTINUED | OUTPATIENT
Start: 2018-12-04 | End: 2018-12-04 | Stop reason: HOSPADM

## 2018-12-04 RX ORDER — ASPIRIN 81 MG/1
81 TABLET ORAL DAILY
Qty: 30 TABLET | Refills: 5 | Status: SHIPPED | OUTPATIENT
Start: 2018-12-04 | End: 2019-06-27 | Stop reason: SDUPTHER

## 2018-12-04 RX ORDER — LABETALOL HYDROCHLORIDE 5 MG/ML
10 INJECTION, SOLUTION INTRAVENOUS EVERY 30 MIN PRN
Status: DISCONTINUED | OUTPATIENT
Start: 2018-12-04 | End: 2018-12-04 | Stop reason: HOSPADM

## 2018-12-04 RX ORDER — ASPIRIN 81 MG/1
81 TABLET ORAL ONCE
Status: COMPLETED | OUTPATIENT
Start: 2018-12-04 | End: 2018-12-04

## 2018-12-04 RX ORDER — ALPRAZOLAM 0.25 MG/1
0.5 TABLET ORAL
Status: DISCONTINUED | OUTPATIENT
Start: 2018-12-04 | End: 2018-12-04 | Stop reason: HOSPADM

## 2018-12-04 RX ORDER — ONDANSETRON 2 MG/ML
4 INJECTION INTRAMUSCULAR; INTRAVENOUS EVERY 6 HOURS PRN
Status: DISCONTINUED | OUTPATIENT
Start: 2018-12-04 | End: 2018-12-04

## 2018-12-04 RX ORDER — SODIUM CHLORIDE 0.9 % (FLUSH) 0.9 %
10 SYRINGE (ML) INJECTION PRN
Status: DISCONTINUED | OUTPATIENT
Start: 2018-12-04 | End: 2018-12-04 | Stop reason: HOSPADM

## 2018-12-04 RX ORDER — HYDRALAZINE HYDROCHLORIDE 20 MG/ML
10 INJECTION INTRAMUSCULAR; INTRAVENOUS EVERY 10 MIN PRN
Status: DISCONTINUED | OUTPATIENT
Start: 2018-12-04 | End: 2018-12-04 | Stop reason: HOSPADM

## 2018-12-04 RX ORDER — ONDANSETRON 2 MG/ML
4 INJECTION INTRAMUSCULAR; INTRAVENOUS EVERY 6 HOURS PRN
Status: DISCONTINUED | OUTPATIENT
Start: 2018-12-04 | End: 2018-12-04 | Stop reason: HOSPADM

## 2018-12-04 RX ORDER — CARVEDILOL 3.12 MG/1
3.12 TABLET ORAL 2 TIMES DAILY
Qty: 60 TABLET | Refills: 3 | Status: SHIPPED | OUTPATIENT
Start: 2018-12-04 | End: 2018-12-17 | Stop reason: SDUPTHER

## 2018-12-04 RX ORDER — SODIUM CHLORIDE 9 MG/ML
1000 INJECTION, SOLUTION INTRAVENOUS CONTINUOUS
Status: DISCONTINUED | OUTPATIENT
Start: 2018-12-04 | End: 2018-12-04 | Stop reason: HOSPADM

## 2018-12-04 RX ORDER — SODIUM CHLORIDE 9 MG/ML
INJECTION, SOLUTION INTRAVENOUS CONTINUOUS
Status: DISCONTINUED | OUTPATIENT
Start: 2018-12-04 | End: 2018-12-04 | Stop reason: HOSPADM

## 2018-12-04 RX ORDER — ATORVASTATIN CALCIUM 80 MG/1
80 TABLET, FILM COATED ORAL DAILY
Qty: 30 TABLET | Refills: 3 | Status: SHIPPED | OUTPATIENT
Start: 2018-12-04 | End: 2019-05-06 | Stop reason: SDUPTHER

## 2018-12-04 RX ADMIN — ASPIRIN 81 MG: 81 TABLET ORAL at 08:45

## 2018-12-04 RX ADMIN — SODIUM CHLORIDE: 9 INJECTION, SOLUTION INTRAVENOUS at 08:46

## 2018-12-07 ENCOUNTER — TELEPHONE (OUTPATIENT)
Dept: CARDIOLOGY | Age: 68
End: 2018-12-07

## 2018-12-10 NOTE — TELEPHONE ENCOUNTER
Talked with Dr. Sarai Cervantes he is wanting patient to have a PET scan in [place] [address] at [facility] to assess his anterior wall to determine if he is a candidate for CABG. I was not aware of this until now. I did call daughter back but she is not listed on patient HIPPA form. Explained to her as soon as I get an ok from Mr. Polina Maciasnorris can let her know what MD said. I tried calling patient to get this ok'd but there was no answer and no voicemail box set up.

## 2018-12-11 NOTE — TELEPHONE ENCOUNTER
I reached out to daughter to see if there was anyway she could reach out to her father/patient to let him know I have been trying to call to get an ok to speak to her but there was no answer or voicemail box set up.

## 2018-12-17 ENCOUNTER — OFFICE VISIT (OUTPATIENT)
Dept: UROLOGY | Age: 68
End: 2018-12-17
Payer: MEDICARE

## 2018-12-17 ENCOUNTER — OFFICE VISIT (OUTPATIENT)
Dept: CARDIOLOGY | Age: 68
End: 2018-12-17
Payer: MEDICARE

## 2018-12-17 VITALS
HEIGHT: 71 IN | SYSTOLIC BLOOD PRESSURE: 128 MMHG | DIASTOLIC BLOOD PRESSURE: 86 MMHG | HEART RATE: 68 BPM | WEIGHT: 169 LBS | BODY MASS INDEX: 23.66 KG/M2

## 2018-12-17 VITALS — BODY MASS INDEX: 26.84 KG/M2 | TEMPERATURE: 99 F | WEIGHT: 167 LBS | HEIGHT: 66 IN

## 2018-12-17 DIAGNOSIS — I51.9 LEFT VENTRICULAR DYSFUNCTION: ICD-10-CM

## 2018-12-17 DIAGNOSIS — I50.20 SYSTOLIC CONGESTIVE HEART FAILURE, UNSPECIFIED HF CHRONICITY (HCC): Primary | ICD-10-CM

## 2018-12-17 DIAGNOSIS — R94.39 ABNORMAL NUCLEAR STRESS TEST: ICD-10-CM

## 2018-12-17 DIAGNOSIS — E78.00 HYPERCHOLESTEROLEMIA: ICD-10-CM

## 2018-12-17 DIAGNOSIS — C61 PROSTATE CANCER (HCC): ICD-10-CM

## 2018-12-17 DIAGNOSIS — I10 ESSENTIAL HYPERTENSION: ICD-10-CM

## 2018-12-17 DIAGNOSIS — R06.09 DOE (DYSPNEA ON EXERTION): ICD-10-CM

## 2018-12-17 DIAGNOSIS — C61 PROSTATE CANCER (HCC): Primary | ICD-10-CM

## 2018-12-17 DIAGNOSIS — I25.10 CORONARY ARTERY DISEASE INVOLVING NATIVE CORONARY ARTERY OF NATIVE HEART WITHOUT ANGINA PECTORIS: ICD-10-CM

## 2018-12-17 LAB
BILIRUBIN, POC: 0
BLOOD URINE, POC: NORMAL
CLARITY, POC: CLEAR
COLOR, POC: YELLOW
GLUCOSE URINE, POC: POSITIVE
KETONES, POC: NORMAL
LEUKOCYTE EST, POC: NORMAL
NITRITE, POC: NORMAL
PH, POC: 5.5
PROSTATE SPECIFIC ANTIGEN: 6.67 NG/ML (ref 0–4)
PROTEIN, POC: NORMAL
SPECIFIC GRAVITY, POC: 1.02
UROBILINOGEN, POC: 0.2

## 2018-12-17 PROCEDURE — G8427 DOCREV CUR MEDS BY ELIG CLIN: HCPCS | Performed by: UROLOGY

## 2018-12-17 PROCEDURE — 4040F PNEUMOC VAC/ADMIN/RCVD: CPT | Performed by: UROLOGY

## 2018-12-17 PROCEDURE — 1123F ACP DISCUSS/DSCN MKR DOCD: CPT | Performed by: UROLOGY

## 2018-12-17 PROCEDURE — 99214 OFFICE O/P EST MOD 30 MIN: CPT | Performed by: INTERNAL MEDICINE

## 2018-12-17 PROCEDURE — 1101F PT FALLS ASSESS-DOCD LE1/YR: CPT | Performed by: UROLOGY

## 2018-12-17 PROCEDURE — 93000 ELECTROCARDIOGRAM COMPLETE: CPT | Performed by: INTERNAL MEDICINE

## 2018-12-17 PROCEDURE — 99214 OFFICE O/P EST MOD 30 MIN: CPT | Performed by: UROLOGY

## 2018-12-17 PROCEDURE — 81003 URINALYSIS AUTO W/O SCOPE: CPT | Performed by: UROLOGY

## 2018-12-17 PROCEDURE — G8484 FLU IMMUNIZE NO ADMIN: HCPCS | Performed by: UROLOGY

## 2018-12-17 PROCEDURE — G8420 CALC BMI NORM PARAMETERS: HCPCS | Performed by: UROLOGY

## 2018-12-17 PROCEDURE — 4004F PT TOBACCO SCREEN RCVD TLK: CPT | Performed by: UROLOGY

## 2018-12-17 PROCEDURE — G8598 ASA/ANTIPLAT THER USED: HCPCS | Performed by: UROLOGY

## 2018-12-17 PROCEDURE — 3017F COLORECTAL CA SCREEN DOC REV: CPT | Performed by: UROLOGY

## 2018-12-17 RX ORDER — CARVEDILOL 3.12 MG/1
6.25 TABLET ORAL 2 TIMES DAILY
Qty: 60 TABLET | Refills: 3 | Status: SHIPPED | OUTPATIENT
Start: 2018-12-17 | End: 2019-01-17 | Stop reason: CLARIF

## 2018-12-17 ASSESSMENT — ENCOUNTER SYMPTOMS
COLOR CHANGE: 0
DIARRHEA: 0
DIARRHEA: 0
WHEEZING: 0
RHINORRHEA: 0
VOMITING: 0
ABDOMINAL PAIN: 0
BACK PAIN: 0
VOMITING: 0
ABDOMINAL DISTENTION: 0
FACIAL SWELLING: 0
CONSTIPATION: 0
GASTROINTESTINAL NEGATIVE: 1
SORE THROAT: 0
BLOOD IN STOOL: 0
SINUS PRESSURE: 0
RESPIRATORY NEGATIVE: 1
COUGH: 0
SHORTNESS OF BREATH: 0
EYE PAIN: 0
NAUSEA: 0
EYE REDNESS: 0
EYE DISCHARGE: 0
SHORTNESS OF BREATH: 0
NAUSEA: 0
EYES NEGATIVE: 1

## 2018-12-17 NOTE — PROGRESS NOTES
[facility]                            Date:  12/17/2018  Patient: Gumaro Camejo  Age:  70 y.o., 1950      Reason for evaluation:         SUBJECTIVE:    Seen today follow-up assessment. Underwent stress testing 11/20/18 abnormal followed by catheterization 12/4/18 revealing chronically occluded mid LAD fills somewhat by collaterals from the right coronary artery extensive anteroapical scar. Multivessel disease present. Denies anginal chest pain. Gets out of breath somewhat easily. Given his extensive coronary disease and abnormal left ventricular function I had advised him that I thought the risks of major prostate surgery could be quite formidable and suggested that alternative therapies be considered. I'd also recommended consideration for some type of viability study preferentially a PET cardiac scan to assess his anterior wall to see if he might be a surgical revascularization candidate. Patient and family wanted to wait until today to have further discussion regarding this. Review of Systems   Constitutional: Negative. Negative for chills, fever and unexpected weight change. HENT: Negative. Eyes: Negative. Respiratory: Negative. Negative for shortness of breath. Cardiovascular: Negative. Negative for chest pain. Gastrointestinal: Negative. Negative for diarrhea, nausea and vomiting. Endocrine: Negative. Genitourinary: Negative. Musculoskeletal: Negative. Skin: Negative. Neurological: Negative. All other systems reviewed and are negative. OBJECTIVE:    /86   Pulse 68   Ht 5' 10.5\" (1.791 m)   Wt 169 lb (76.7 kg)   BMI 23.91 kg/m²     Labs:   CBC: No results for input(s): WBC, HGB, HCT, PLT in the last 72 hours. BMP:No results for input(s): NA, K, CO2, BUN, CREATININE, LABGLOM, GLUCOSE in the last 72 hours. BNP: No results for input(s): BNP in the last 72 hours.   PT/INR: No results for input(s): PROTIME, INR in the last 72 software. Although all attempts are made to edit the dictation for accuracy, there may be errors in the transcription that are not intended.

## 2018-12-17 NOTE — PROGRESS NOTES
performed in visit on 12/17/18   POCT Urinalysis No Micro (Auto)   Result Value Ref Range    Color, UA yellow     Clarity, UA clear     Glucose, UA POC positive     Bilirubin, UA 0     Ketones, UA neg     Spec Grav, UA 1.025     Blood, UA POC neg     pH, UA 5.5     Protein, UA POC neg     Urobilinogen, UA 0.2     Leukocytes, UA neg     Nitrite, UA neg      Lab Results   Component Value Date    PSA 6.67 (H) 12/17/2018       1. Prostate cancer ([facility] [address]  His PSA is essentially unchanged and stable so no evidence of progression of his cancer. He is quite adamant that he wants to have surgery if possible but I told him in light of the recommendations from the cardiologist that alternative therapies such as radiation would provide him with equivalent 10 year survival benefit as compared to surgery based on  the medical literature. He wanted to meet with his cardiologist today and he'll follow-up to see me in the week so we can formulate a game plan going foward for management of his prostate cancer  - POCT Urinalysis No Micro (Auto)  - PSA, Diagnostic; Future      Orders Placed This Encounter   Procedures    PSA, Diagnostic     PSA today     Standing Status:   Future     Number of Occurrences:   1     Standing Expiration Date:   12/17/2019    POCT Urinalysis No Micro (Auto)        Return in about 3 days (around 12/20/2018) for PSA prior to vext visit. EMR Dragon/transcription disclaimer: Much of this documentt is electronic  transcription/translation of spoken language to printed text. The  electronic translation of spoken language may be erroneous, or at times,  nonsensical words or phrases may be inadvertently transcribed.  Although I  have reviewed the document for such errors, some may still exist.

## 2018-12-20 ENCOUNTER — TELEPHONE (OUTPATIENT)
Dept: CARDIOLOGY | Age: 68
End: 2018-12-20

## 2018-12-20 ENCOUNTER — OFFICE VISIT (OUTPATIENT)
Dept: UROLOGY | Age: 68
End: 2018-12-20
Payer: MEDICARE

## 2018-12-20 VITALS — WEIGHT: 165 LBS | HEIGHT: 67 IN | TEMPERATURE: 97 F | BODY MASS INDEX: 25.9 KG/M2

## 2018-12-20 DIAGNOSIS — C61 PROSTATE CANCER (HCC): Primary | ICD-10-CM

## 2018-12-20 PROBLEM — E11.9 DIABETES MELLITUS (HCC): Status: ACTIVE | Noted: 2018-12-20

## 2018-12-20 PROBLEM — Z86.79 HISTORY OF HYPERTENSION: Status: ACTIVE | Noted: 2018-12-20

## 2018-12-20 PROBLEM — E03.9 HYPOTHYROIDISM: Status: ACTIVE | Noted: 2018-07-06

## 2018-12-20 PROBLEM — Z80.9 FAMILY HISTORY OF CANCER: Status: ACTIVE | Noted: 2018-12-20

## 2018-12-20 PROCEDURE — 3017F COLORECTAL CA SCREEN DOC REV: CPT | Performed by: UROLOGY

## 2018-12-20 PROCEDURE — 99214 OFFICE O/P EST MOD 30 MIN: CPT | Performed by: UROLOGY

## 2018-12-20 PROCEDURE — G8419 CALC BMI OUT NRM PARAM NOF/U: HCPCS | Performed by: UROLOGY

## 2018-12-20 PROCEDURE — G8598 ASA/ANTIPLAT THER USED: HCPCS | Performed by: UROLOGY

## 2018-12-20 PROCEDURE — 1101F PT FALLS ASSESS-DOCD LE1/YR: CPT | Performed by: UROLOGY

## 2018-12-20 PROCEDURE — G8427 DOCREV CUR MEDS BY ELIG CLIN: HCPCS | Performed by: UROLOGY

## 2018-12-20 PROCEDURE — 4004F PT TOBACCO SCREEN RCVD TLK: CPT | Performed by: UROLOGY

## 2018-12-20 PROCEDURE — G8484 FLU IMMUNIZE NO ADMIN: HCPCS | Performed by: UROLOGY

## 2018-12-20 PROCEDURE — 4040F PNEUMOC VAC/ADMIN/RCVD: CPT | Performed by: UROLOGY

## 2018-12-20 PROCEDURE — 1123F ACP DISCUSS/DSCN MKR DOCD: CPT | Performed by: UROLOGY

## 2018-12-20 RX ORDER — CARVEDILOL 6.25 MG/1
TABLET ORAL 2 TIMES DAILY WITH MEALS
COMMUNITY
Start: 2018-12-19 | End: 2019-01-17 | Stop reason: SDUPTHER

## 2018-12-20 ASSESSMENT — ENCOUNTER SYMPTOMS
ABDOMINAL PAIN: 0
WHEEZING: 0
BACK PAIN: 0
VOMITING: 0
SINUS PAIN: 0
SHORTNESS OF BREATH: 0
EYE PAIN: 0

## 2018-12-20 NOTE — PROGRESS NOTES
3    Past Medical History:   Diagnosis Date    Arthritis     Asthma     Cancer ([facility] 08/2018    Prostrate.  Coronary artery disease 12/17/2018    Diabetes mellitus ([facility]     Hypercholesterolemia 12/17/2018    Hypertension     Hypothyroidism 7/6/2018    Rheumatic fever     as child    Systolic congestive heart failure ([facility] 12/17/2018       Past Surgical History:   Procedure Laterality Date    APPENDECTOMY      CHOLECYSTECTOMY      PANCREAS SURGERY      STENT PLACED IN BILE DUCT       Current Outpatient Prescriptions   Medication Sig Dispense Refill    metFORMIN (GLUCOPHAGE) 1000 MG tablet Take 1,000 mg by mouth 2 times daily (with meals)      carvedilol (COREG) 3.125 MG tablet Take 2 tablets by mouth 2 times daily 60 tablet 3    aspirin EC 81 MG EC tablet Take 1 tablet by mouth daily 30 tablet 5    atorvastatin (LIPITOR) 80 MG tablet Take 1 tablet by mouth daily 30 tablet 3    glipiZIDE (GLUCOTROL) 5 MG tablet Take 5 mg by mouth daily       lisinopril (PRINIVIL;ZESTRIL) 10 MG tablet lisinopril 10 mg tablet  DAILY      carvedilol (COREG) 6.25 MG tablet        No current facility-administered medications for this visit. Allergies   Allergen Reactions    Phenergan [Promethazine Hcl]        Social History     Social History    Marital status:      Spouse name: N/A    Number of children: N/A    Years of education: N/A     Social History Main Topics    Smoking status: Never Smoker    Smokeless tobacco: Current User     Types: Chew    Alcohol use Yes      Comment: occ    Drug use: No    Sexual activity: Not Asked     Other Topics Concern    None     Social History Narrative    None       Family History   Problem Relation Age of Onset    Cancer Mother     Tuberculosis Father        REVIEW OF SYSTEMS:  Review of Systems   HENT: Negative for nosebleeds and sinus pain. Eyes: Negative for pain. Respiratory: Negative for shortness of breath and wheezing.     Cardiovascular: Negative for palpitations and leg swelling. Gastrointestinal: Negative for abdominal pain and vomiting. Endocrine: Negative for polydipsia. Genitourinary: Negative for dysuria, flank pain, frequency, hematuria and urgency. Musculoskeletal: Negative for back pain and myalgias. Skin: Negative for rash. Allergic/Immunologic: Negative for environmental allergies. Neurological: Negative for tremors. Psychiatric/Behavioral: Negative for hallucinations. The patient is not nervous/anxious. PHYSICAL EXAM:  Temp 97 °F (36.1 °C) (Temporal)   Ht 5' 7\" (1.702 m)   Wt 165 lb (74.8 kg)   BMI 25.84 kg/m²   Physical Exam   Constitutional: He is oriented to person, place, and time. He appears well-developed and well-nourished. No distress. HENT:   Head: Normocephalic and atraumatic. Nose: Nose normal.   Eyes: Pupils are equal, round, and reactive to light. Conjunctivae and EOM are normal. No scleral icterus. Neck: Normal range of motion. Neck supple. No tracheal deviation present. Cardiovascular: Normal rate, regular rhythm and intact distal pulses. Pulmonary/Chest: Effort normal and breath sounds normal. No stridor. He exhibits no tenderness. Abdominal: Soft. Bowel sounds are normal. He exhibits no distension and no mass. There is no tenderness. Musculoskeletal: Normal range of motion. He exhibits no edema or tenderness. Lymphadenopathy:     He has no cervical adenopathy. Neurological: He is alert and oriented to person, place, and time. Skin: Skin is warm and dry. No erythema. Psychiatric: He has a normal mood and affect. His behavior is normal. Judgment normal.           DATA:    No results found for this visit on 12/20/18. Lab Results   Component Value Date    PSA 6.67 (H) 12/17/2018         1. Prostate cancer ([facility] [address]  His PSA is stable and not significantly changed since his diagnosis back in July therefore no evidence of progression of his prostate cancer.  Given his extensive

## 2018-12-20 NOTE — LETTER
[id number] [facility]  [address] [place] [address], [address] [place] [address]  Phone: [phone]  Fax: [phone]    Luma Martinez MD        December 20, 2018     [id number] [facility]      Patient: Ponce Curtis   MR Number: [id number]   YOB: 1950   Date of Visit: 12/20/2018       Dear Provider: Thank you for referring Brittany Portillo to me for evaluation. Below are the relevant portions of my assessment and plan of care. If you have questions, please do not hesitate to call me. I look forward to following Darlin Kee along with you.     Sincerely,        Luma Martinez MD

## 2019-01-07 ENCOUNTER — TELEPHONE (OUTPATIENT)
Dept: UROLOGY | Age: 69
End: 2019-01-07

## 2019-01-07 DIAGNOSIS — C61 PROSTATE CANCER (HCC): Primary | ICD-10-CM

## 2019-01-07 RX ORDER — LEVOFLOXACIN 500 MG/1
500 TABLET, FILM COATED ORAL DAILY
Qty: 4 TABLET | Refills: 0 | Status: SHIPPED | OUTPATIENT
Start: 2019-01-07 | End: 2019-01-11

## 2019-01-10 ENCOUNTER — TELEPHONE (OUTPATIENT)
Dept: UROLOGY | Age: 69
End: 2019-01-10

## 2019-01-11 ENCOUNTER — PROCEDURE VISIT (OUTPATIENT)
Dept: UROLOGY | Age: 69
End: 2019-01-11
Payer: MEDICARE

## 2019-01-11 VITALS — HEIGHT: 68 IN | TEMPERATURE: 96.9 F | WEIGHT: 163 LBS | BODY MASS INDEX: 24.71 KG/M2

## 2019-01-11 DIAGNOSIS — C61 PROSTATE CANCER (HCC): Primary | ICD-10-CM

## 2019-01-11 PROCEDURE — 99999 PR OFFICE/OUTPT VISIT,PROCEDURE ONLY: CPT | Performed by: UROLOGY

## 2019-01-11 PROCEDURE — 99999 PR ECHO,TRANSRECTAL: CPT | Performed by: UROLOGY

## 2019-01-11 PROCEDURE — 96372 THER/PROPH/DIAG INJ SC/IM: CPT | Performed by: UROLOGY

## 2019-01-11 PROCEDURE — 55876 PLACE RT DEVICE/MARKER PROS: CPT | Performed by: UROLOGY

## 2019-01-11 RX ORDER — GENTAMICIN SULFATE 40 MG/ML
80 INJECTION, SOLUTION INTRAMUSCULAR; INTRAVENOUS ONCE
Status: COMPLETED | OUTPATIENT
Start: 2019-01-11 | End: 2019-01-11

## 2019-01-11 RX ADMIN — GENTAMICIN SULFATE 80 MG: 40 INJECTION, SOLUTION INTRAMUSCULAR; INTRAVENOUS at 08:02

## 2019-01-14 ENCOUNTER — TELEPHONE (OUTPATIENT)
Dept: UROLOGY | Age: 69
End: 2019-01-14

## 2019-01-17 ENCOUNTER — OFFICE VISIT (OUTPATIENT)
Dept: CARDIOLOGY | Age: 69
End: 2019-01-17
Payer: MEDICARE

## 2019-01-17 VITALS
BODY MASS INDEX: 23.48 KG/M2 | HEART RATE: 68 BPM | WEIGHT: 164 LBS | HEIGHT: 70 IN | SYSTOLIC BLOOD PRESSURE: 122 MMHG | DIASTOLIC BLOOD PRESSURE: 68 MMHG

## 2019-01-17 DIAGNOSIS — R06.09 DOE (DYSPNEA ON EXERTION): ICD-10-CM

## 2019-01-17 DIAGNOSIS — E78.00 HYPERCHOLESTEROLEMIA: ICD-10-CM

## 2019-01-17 DIAGNOSIS — I51.9 LEFT VENTRICULAR DYSFUNCTION: ICD-10-CM

## 2019-01-17 DIAGNOSIS — I25.10 CORONARY ARTERY DISEASE INVOLVING NATIVE CORONARY ARTERY OF NATIVE HEART WITHOUT ANGINA PECTORIS: ICD-10-CM

## 2019-01-17 DIAGNOSIS — I50.20 SYSTOLIC CONGESTIVE HEART FAILURE, UNSPECIFIED HF CHRONICITY (HCC): ICD-10-CM

## 2019-01-17 DIAGNOSIS — I10 ESSENTIAL HYPERTENSION: Primary | ICD-10-CM

## 2019-01-17 DIAGNOSIS — R94.39 ABNORMAL NUCLEAR STRESS TEST: ICD-10-CM

## 2019-01-17 DIAGNOSIS — I25.5 ISCHEMIC CARDIOMYOPATHY: ICD-10-CM

## 2019-01-17 PROCEDURE — 4004F PT TOBACCO SCREEN RCVD TLK: CPT | Performed by: INTERNAL MEDICINE

## 2019-01-17 PROCEDURE — 3017F COLORECTAL CA SCREEN DOC REV: CPT | Performed by: INTERNAL MEDICINE

## 2019-01-17 PROCEDURE — G8427 DOCREV CUR MEDS BY ELIG CLIN: HCPCS | Performed by: INTERNAL MEDICINE

## 2019-01-17 PROCEDURE — 4040F PNEUMOC VAC/ADMIN/RCVD: CPT | Performed by: INTERNAL MEDICINE

## 2019-01-17 PROCEDURE — 1123F ACP DISCUSS/DSCN MKR DOCD: CPT | Performed by: INTERNAL MEDICINE

## 2019-01-17 PROCEDURE — 93000 ELECTROCARDIOGRAM COMPLETE: CPT | Performed by: INTERNAL MEDICINE

## 2019-01-17 PROCEDURE — 1101F PT FALLS ASSESS-DOCD LE1/YR: CPT | Performed by: INTERNAL MEDICINE

## 2019-01-17 PROCEDURE — 99214 OFFICE O/P EST MOD 30 MIN: CPT | Performed by: INTERNAL MEDICINE

## 2019-01-17 PROCEDURE — G8598 ASA/ANTIPLAT THER USED: HCPCS | Performed by: INTERNAL MEDICINE

## 2019-01-17 PROCEDURE — G8484 FLU IMMUNIZE NO ADMIN: HCPCS | Performed by: INTERNAL MEDICINE

## 2019-01-17 PROCEDURE — G8420 CALC BMI NORM PARAMETERS: HCPCS | Performed by: INTERNAL MEDICINE

## 2019-01-17 RX ORDER — CARVEDILOL 6.25 MG/1
12.5 TABLET ORAL 2 TIMES DAILY WITH MEALS
Qty: 60 TABLET | Refills: 5 | Status: SHIPPED | OUTPATIENT
Start: 2019-01-17 | End: 2019-01-17 | Stop reason: SDUPTHER

## 2019-01-17 RX ORDER — CARVEDILOL 6.25 MG/1
12.5 TABLET ORAL 2 TIMES DAILY WITH MEALS
Qty: 120 TABLET | Refills: 5 | Status: SHIPPED | OUTPATIENT
Start: 2019-01-17 | End: 2019-06-27 | Stop reason: SDUPTHER

## 2019-01-17 ASSESSMENT — ENCOUNTER SYMPTOMS
GASTROINTESTINAL NEGATIVE: 1
EYES NEGATIVE: 1
RESPIRATORY NEGATIVE: 1
DIARRHEA: 0
SHORTNESS OF BREATH: 0
VOMITING: 0
NAUSEA: 0

## 2019-02-18 DIAGNOSIS — R94.39 ABNORMAL NUCLEAR STRESS TEST: ICD-10-CM

## 2019-02-18 DIAGNOSIS — I25.10 CORONARY ARTERY DISEASE INVOLVING NATIVE CORONARY ARTERY OF NATIVE HEART WITHOUT ANGINA PECTORIS: ICD-10-CM

## 2019-02-18 DIAGNOSIS — I10 ESSENTIAL HYPERTENSION: ICD-10-CM

## 2019-02-18 DIAGNOSIS — I25.5 ISCHEMIC CARDIOMYOPATHY: ICD-10-CM

## 2019-02-18 DIAGNOSIS — I51.9 LEFT VENTRICULAR DYSFUNCTION: ICD-10-CM

## 2019-02-18 DIAGNOSIS — I50.20 SYSTOLIC CONGESTIVE HEART FAILURE, UNSPECIFIED HF CHRONICITY (HCC): ICD-10-CM

## 2019-02-18 DIAGNOSIS — E78.00 HYPERCHOLESTEROLEMIA: ICD-10-CM

## 2019-02-18 DIAGNOSIS — R06.09 DOE (DYSPNEA ON EXERTION): ICD-10-CM

## 2019-02-18 LAB
ANION GAP SERPL CALCULATED.3IONS-SCNC: 15 MMOL/L (ref 7–19)
BUN BLDV-MCNC: 10 MG/DL (ref 8–23)
CALCIUM SERPL-MCNC: 9 MG/DL (ref 8.8–10.2)
CHLORIDE BLD-SCNC: 100 MMOL/L (ref 98–111)
CHOLESTEROL, TOTAL: 133 MG/DL (ref 160–199)
CO2: 25 MMOL/L (ref 22–29)
CREAT SERPL-MCNC: 0.6 MG/DL (ref 0.5–1.2)
GFR NON-AFRICAN AMERICAN: >60
GLUCOSE BLD-MCNC: 344 MG/DL (ref 74–109)
HDLC SERPL-MCNC: 51 MG/DL (ref 55–121)
LDL CHOLESTEROL CALCULATED: 66 MG/DL
POTASSIUM SERPL-SCNC: 4.2 MMOL/L (ref 3.5–5)
SODIUM BLD-SCNC: 140 MMOL/L (ref 136–145)
TRIGL SERPL-MCNC: 81 MG/DL (ref 0–149)

## 2019-02-21 ENCOUNTER — OFFICE VISIT (OUTPATIENT)
Dept: CARDIOLOGY | Age: 69
End: 2019-02-21
Payer: MEDICARE

## 2019-02-21 VITALS
WEIGHT: 160 LBS | HEIGHT: 70 IN | HEART RATE: 80 BPM | BODY MASS INDEX: 22.9 KG/M2 | DIASTOLIC BLOOD PRESSURE: 78 MMHG | SYSTOLIC BLOOD PRESSURE: 138 MMHG

## 2019-02-21 DIAGNOSIS — I10 ESSENTIAL HYPERTENSION: ICD-10-CM

## 2019-02-21 DIAGNOSIS — E78.00 HYPERCHOLESTEROLEMIA: ICD-10-CM

## 2019-02-21 DIAGNOSIS — I25.5 ISCHEMIC CARDIOMYOPATHY: ICD-10-CM

## 2019-02-21 DIAGNOSIS — R94.39 ABNORMAL NUCLEAR STRESS TEST: ICD-10-CM

## 2019-02-21 DIAGNOSIS — I25.10 CORONARY ARTERY DISEASE INVOLVING NATIVE CORONARY ARTERY OF NATIVE HEART WITHOUT ANGINA PECTORIS: Primary | ICD-10-CM

## 2019-02-21 DIAGNOSIS — I50.22 CHRONIC SYSTOLIC CONGESTIVE HEART FAILURE (HCC): ICD-10-CM

## 2019-02-21 PROCEDURE — 3017F COLORECTAL CA SCREEN DOC REV: CPT | Performed by: INTERNAL MEDICINE

## 2019-02-21 PROCEDURE — 99214 OFFICE O/P EST MOD 30 MIN: CPT | Performed by: INTERNAL MEDICINE

## 2019-02-21 PROCEDURE — 4040F PNEUMOC VAC/ADMIN/RCVD: CPT | Performed by: INTERNAL MEDICINE

## 2019-02-21 PROCEDURE — G8427 DOCREV CUR MEDS BY ELIG CLIN: HCPCS | Performed by: INTERNAL MEDICINE

## 2019-02-21 PROCEDURE — G8598 ASA/ANTIPLAT THER USED: HCPCS | Performed by: INTERNAL MEDICINE

## 2019-02-21 PROCEDURE — G8484 FLU IMMUNIZE NO ADMIN: HCPCS | Performed by: INTERNAL MEDICINE

## 2019-02-21 PROCEDURE — 1101F PT FALLS ASSESS-DOCD LE1/YR: CPT | Performed by: INTERNAL MEDICINE

## 2019-02-21 PROCEDURE — 4004F PT TOBACCO SCREEN RCVD TLK: CPT | Performed by: INTERNAL MEDICINE

## 2019-02-21 PROCEDURE — G8420 CALC BMI NORM PARAMETERS: HCPCS | Performed by: INTERNAL MEDICINE

## 2019-02-21 PROCEDURE — 1123F ACP DISCUSS/DSCN MKR DOCD: CPT | Performed by: INTERNAL MEDICINE

## 2019-02-21 RX ORDER — LISINOPRIL 10 MG/1
10 TABLET ORAL 2 TIMES DAILY
Qty: 60 TABLET | Refills: 5 | Status: SHIPPED | OUTPATIENT
Start: 2019-02-21 | End: 2019-05-24 | Stop reason: SDUPTHER

## 2019-02-21 ASSESSMENT — ENCOUNTER SYMPTOMS
SHORTNESS OF BREATH: 0
NAUSEA: 0
DIARRHEA: 0
EYES NEGATIVE: 1
RESPIRATORY NEGATIVE: 1
VOMITING: 0
GASTROINTESTINAL NEGATIVE: 1

## 2019-03-28 ENCOUNTER — OFFICE VISIT (OUTPATIENT)
Dept: CARDIOLOGY | Age: 69
End: 2019-03-28
Payer: MEDICARE

## 2019-03-28 ENCOUNTER — HOSPITAL ENCOUNTER (OUTPATIENT)
Dept: VASCULAR LAB | Age: 69
Discharge: HOME | End: 2019-03-28
Payer: MEDICARE

## 2019-03-28 VITALS
HEIGHT: 70 IN | BODY MASS INDEX: 23.28 KG/M2 | DIASTOLIC BLOOD PRESSURE: 70 MMHG | SYSTOLIC BLOOD PRESSURE: 128 MMHG | WEIGHT: 162.6 LBS

## 2019-03-28 DIAGNOSIS — I25.10 CORONARY ARTERY DISEASE INVOLVING NATIVE CORONARY ARTERY OF NATIVE HEART WITHOUT ANGINA PECTORIS: Primary | ICD-10-CM

## 2019-03-28 DIAGNOSIS — I10 ESSENTIAL HYPERTENSION: ICD-10-CM

## 2019-03-28 DIAGNOSIS — H53.8 BLURRED VISION, RIGHT EYE: ICD-10-CM

## 2019-03-28 DIAGNOSIS — I25.5 ISCHEMIC CARDIOMYOPATHY: ICD-10-CM

## 2019-03-28 DIAGNOSIS — R06.09 DOE (DYSPNEA ON EXERTION): ICD-10-CM

## 2019-03-28 DIAGNOSIS — I50.20 SYSTOLIC CONGESTIVE HEART FAILURE, UNSPECIFIED HF CHRONICITY (HCC): ICD-10-CM

## 2019-03-28 DIAGNOSIS — E78.00 HYPERCHOLESTEROLEMIA: ICD-10-CM

## 2019-03-28 DIAGNOSIS — R94.39 ABNORMAL NUCLEAR STRESS TEST: ICD-10-CM

## 2019-03-28 DIAGNOSIS — G45.3 AMAUROSIS FUGAX: Primary | ICD-10-CM

## 2019-03-28 PROCEDURE — G8420 CALC BMI NORM PARAMETERS: HCPCS | Performed by: INTERNAL MEDICINE

## 2019-03-28 PROCEDURE — G8484 FLU IMMUNIZE NO ADMIN: HCPCS | Performed by: INTERNAL MEDICINE

## 2019-03-28 PROCEDURE — 99214 OFFICE O/P EST MOD 30 MIN: CPT | Performed by: INTERNAL MEDICINE

## 2019-03-28 PROCEDURE — 4004F PT TOBACCO SCREEN RCVD TLK: CPT | Performed by: INTERNAL MEDICINE

## 2019-03-28 PROCEDURE — 93880 EXTRACRANIAL BILAT STUDY: CPT

## 2019-03-28 PROCEDURE — 1123F ACP DISCUSS/DSCN MKR DOCD: CPT | Performed by: INTERNAL MEDICINE

## 2019-03-28 PROCEDURE — 4040F PNEUMOC VAC/ADMIN/RCVD: CPT | Performed by: INTERNAL MEDICINE

## 2019-03-28 PROCEDURE — G8598 ASA/ANTIPLAT THER USED: HCPCS | Performed by: INTERNAL MEDICINE

## 2019-03-28 PROCEDURE — 3017F COLORECTAL CA SCREEN DOC REV: CPT | Performed by: INTERNAL MEDICINE

## 2019-03-28 PROCEDURE — G8427 DOCREV CUR MEDS BY ELIG CLIN: HCPCS | Performed by: INTERNAL MEDICINE

## 2019-03-28 RX ORDER — CLOPIDOGREL BISULFATE 75 MG/1
75 TABLET ORAL DAILY
Qty: 30 TABLET | Refills: 5 | Status: SHIPPED | OUTPATIENT
Start: 2019-03-28 | End: 2019-06-27 | Stop reason: SDUPTHER

## 2019-03-28 ASSESSMENT — ENCOUNTER SYMPTOMS
EYES NEGATIVE: 1
DIARRHEA: 0
VOMITING: 0
SHORTNESS OF BREATH: 0
GASTROINTESTINAL NEGATIVE: 1
NAUSEA: 0
RESPIRATORY NEGATIVE: 1

## 2019-04-03 ENCOUNTER — TELEPHONE (OUTPATIENT)
Dept: CARDIOLOGY | Age: 69
End: 2019-04-03

## 2019-04-03 NOTE — TELEPHONE ENCOUNTER
Patient daughter left voicemail on my phone asking for carotid test results. It appears   There is mixed plaque visualized in the bilateral internal carotid    artery(ies).    There is less than 50% stenosis in the right internal carotid artery.   Jhonny Jean-Baptiste is less than 50% stenosis of the left internal carotid artery.    There is normal antegrade flow in the bilateral vertebral artery(ies). Tried calling her back no answer and voicemail box was not set up.

## 2019-05-06 RX ORDER — ATORVASTATIN CALCIUM 80 MG/1
80 TABLET, FILM COATED ORAL DAILY
Qty: 30 TABLET | Refills: 5 | Status: SHIPPED | OUTPATIENT
Start: 2019-05-06 | End: 2019-11-06 | Stop reason: SDUPTHER

## 2019-05-16 DIAGNOSIS — C61 PROSTATE CANCER (HCC): ICD-10-CM

## 2019-05-16 LAB — PROSTATE SPECIFIC ANTIGEN: 2.62 NG/ML (ref 0–4)

## 2019-05-24 ENCOUNTER — OFFICE VISIT (OUTPATIENT)
Dept: UROLOGY | Age: 69
End: 2019-05-24
Payer: MEDICARE

## 2019-05-24 VITALS — TEMPERATURE: 97.2 F | HEIGHT: 70 IN | WEIGHT: 156 LBS | BODY MASS INDEX: 22.33 KG/M2

## 2019-05-24 DIAGNOSIS — C61 PROSTATE CANCER (HCC): Primary | ICD-10-CM

## 2019-05-24 LAB
BILIRUBIN, POC: 0
BLOOD URINE, POC: NORMAL
CLARITY, POC: CLEAR
COLOR, POC: YELLOW
GLUCOSE URINE, POC: POSITIVE
KETONES, POC: NORMAL
LEUKOCYTE EST, POC: NORMAL
NITRITE, POC: NORMAL
PH, POC: 5
PROTEIN, POC: NORMAL
SPECIFIC GRAVITY, POC: 1.01
UROBILINOGEN, POC: 1

## 2019-05-24 PROCEDURE — G8420 CALC BMI NORM PARAMETERS: HCPCS | Performed by: UROLOGY

## 2019-05-24 PROCEDURE — G8427 DOCREV CUR MEDS BY ELIG CLIN: HCPCS | Performed by: UROLOGY

## 2019-05-24 PROCEDURE — 4040F PNEUMOC VAC/ADMIN/RCVD: CPT | Performed by: UROLOGY

## 2019-05-24 PROCEDURE — 81003 URINALYSIS AUTO W/O SCOPE: CPT | Performed by: UROLOGY

## 2019-05-24 PROCEDURE — G8598 ASA/ANTIPLAT THER USED: HCPCS | Performed by: UROLOGY

## 2019-05-24 PROCEDURE — 99213 OFFICE O/P EST LOW 20 MIN: CPT | Performed by: UROLOGY

## 2019-05-24 PROCEDURE — 3017F COLORECTAL CA SCREEN DOC REV: CPT | Performed by: UROLOGY

## 2019-05-24 PROCEDURE — 4004F PT TOBACCO SCREEN RCVD TLK: CPT | Performed by: UROLOGY

## 2019-05-24 PROCEDURE — 1123F ACP DISCUSS/DSCN MKR DOCD: CPT | Performed by: UROLOGY

## 2019-05-24 RX ORDER — GLIPIZIDE 5 MG/1
5 TABLET ORAL
COMMUNITY
End: 2019-05-24 | Stop reason: SDUPTHER

## 2019-05-24 ASSESSMENT — ENCOUNTER SYMPTOMS
SINUS PRESSURE: 0
WHEEZING: 0
RHINORRHEA: 0
EYE PAIN: 0
NAUSEA: 0
FACIAL SWELLING: 0
BLOOD IN STOOL: 0
CONSTIPATION: 0
COLOR CHANGE: 0
EYE DISCHARGE: 0
ABDOMINAL DISTENTION: 0
EYE REDNESS: 0
COUGH: 0
VOMITING: 0
SHORTNESS OF BREATH: 0
DIARRHEA: 0
BACK PAIN: 0
SORE THROAT: 0
ABDOMINAL PAIN: 0

## 2019-05-24 NOTE — PROGRESS NOTES
Bryce Ordonez is a 78 y.o. male who presents today   Chief Complaint   Patient presents with    Follow-up     I'm here for a f/u on prostate cancer after completing radiation therapy with PSA lab done    Prostate Cancer     Prostate Cancer:  Pt is here for follow up of his prostate cancer. It was diagnosed 9 month(s) ago, 08/06/18. His cancer is characterized as ordering confined  high-grade. The Cancer Stage is T1c. Dufur Score  4 + 4 = 8. Previous surgical managment has been none. Positive Margin  NA. Previous Radiation therapy Yes completed 04/10/19. He describes having  no voiding symptoms. No  Incontinence. Yes  Erectile Dysfunction. No weight loss. No  bone pain. Hot flashes No.  Most recent PSA    Lab Results   Component Value Date    PSA 2.62 05/16/2019    PSA 6.67 (H) 12/17/2018     He just completed radiation therapy comes in now for his 1st postradiation PSA. His pre-treatment PSA was 7.6 ng/ML. Because of his cardiovascular disease it was elected not to place him on neoadjuvant hormonal therapy. He said he tolerated the radiation fairly well he did have some rectal urgency    Past Medical History:   Diagnosis Date    Arthritis     Asthma     Cancer ([facility] [address] 08/2018    Prostrate.      Coronary artery disease 12/17/2018    Diabetes mellitus ([facility] [address] [facility]     Hypercholesterolemia 12/17/2018    Hypertension     Hypothyroidism 7/6/2018    Rheumatic fever     as child    Systolic congestive heart failure ([facility] [address] 12/17/2018       Past Surgical History:   Procedure Laterality Date    APPENDECTOMY      CHOLECYSTECTOMY      PANCREAS SURGERY      STENT PLACED IN BILE DUCT       Current Outpatient Medications   Medication Sig Dispense Refill    atorvastatin (LIPITOR) 80 MG tablet Take 1 tablet by mouth daily 30 tablet 5    clopidogrel (PLAVIX) 75 MG tablet Take 1 tablet by mouth daily 30 tablet 5    carvedilol (COREG) 6.25 MG tablet Take 2 tablets by mouth 2 times daily (with meals) 120 tablet 5    and oriented to person, place, and time. Skin: Skin is warm and dry. No erythema. Psychiatric: He has a normal mood and affect. His behavior is normal. Judgment normal.           DATA:    Results for orders placed or performed in visit on 05/24/19   POCT Urinalysis No Micro (Auto)   Result Value Ref Range    Color, UA yellow     Clarity, UA clear     Glucose, UA POC positive     Bilirubin, UA 0     Ketones, UA neg     Spec Grav, UA 1.015     Blood, UA POC neg     pH, UA 5.0     Protein, UA POC neg     Urobilinogen, UA 1.0     Leukocytes, UA neg     Nitrite, UA neg      Lab Results   Component Value Date    PSA 2.62 05/16/2019    PSA 6.67 (H) 12/17/2018     1. Prostate cancer ([facility] [address]  PSA is down to continue to monitor her return in 3 months with PSA prior  - POCT Urinalysis No Micro (Auto)  - PSA, Diagnostic; Future      Orders Placed This Encounter   Procedures    PSA, Diagnostic     PSA in 3 months     Standing Status:   Future     Standing Expiration Date:   5/23/2020    POCT Urinalysis No Micro (Auto)        Return in about 3 months (around 8/24/2019) for PSA prior to vext visit. EMR Dragon/transcription disclaimer: Much of this documentt is electronic  transcription/translation of spoken language to printed text. The  electronic translation of spoken language may be erroneous, or at times,  nonsensical words or phrases may be inadvertently transcribed.  Although I  have reviewed the document for such errors, some may still exist.

## 2019-05-24 NOTE — LETTER
[id number] [facility]  [address] [place] [address] Drive, [address] [place] [address]  Phone: [phone]  Fax: [phone]    Rajiv Coyle MD        May 24, 2019     [place] - [place]  [facility] [address] [place] [address]      Patient: Ida Dorman   MR Number: [id number]   YOB: 1950   Date of Visit: 5/24/2019       Dear Provider: Thank you for referring Baldomero Becerril to me for evaluation. Below are the relevant portions of my assessment and plan of care. If you have questions, please do not hesitate to call me. I look forward to following Kamini Aceves along with you.     Sincerely,        Rajiv Coyle MD

## 2019-06-27 ENCOUNTER — OFFICE VISIT (OUTPATIENT)
Dept: CARDIOLOGY | Age: 69
End: 2019-06-27
Payer: MEDICARE

## 2019-06-27 VITALS
HEART RATE: 72 BPM | WEIGHT: 152 LBS | BODY MASS INDEX: 21.76 KG/M2 | SYSTOLIC BLOOD PRESSURE: 102 MMHG | DIASTOLIC BLOOD PRESSURE: 62 MMHG | HEIGHT: 70 IN

## 2019-06-27 DIAGNOSIS — I50.22 CHRONIC SYSTOLIC CONGESTIVE HEART FAILURE (HCC): ICD-10-CM

## 2019-06-27 DIAGNOSIS — E78.00 HYPERCHOLESTEROLEMIA: ICD-10-CM

## 2019-06-27 DIAGNOSIS — I25.5 ISCHEMIC CARDIOMYOPATHY: ICD-10-CM

## 2019-06-27 DIAGNOSIS — R94.39 ABNORMAL NUCLEAR STRESS TEST: ICD-10-CM

## 2019-06-27 DIAGNOSIS — I10 ESSENTIAL HYPERTENSION: ICD-10-CM

## 2019-06-27 DIAGNOSIS — I25.10 CORONARY ARTERY DISEASE INVOLVING NATIVE CORONARY ARTERY OF NATIVE HEART WITHOUT ANGINA PECTORIS: Primary | ICD-10-CM

## 2019-06-27 PROCEDURE — 4004F PT TOBACCO SCREEN RCVD TLK: CPT | Performed by: INTERNAL MEDICINE

## 2019-06-27 PROCEDURE — 99213 OFFICE O/P EST LOW 20 MIN: CPT | Performed by: INTERNAL MEDICINE

## 2019-06-27 PROCEDURE — G8598 ASA/ANTIPLAT THER USED: HCPCS | Performed by: INTERNAL MEDICINE

## 2019-06-27 PROCEDURE — G8420 CALC BMI NORM PARAMETERS: HCPCS | Performed by: INTERNAL MEDICINE

## 2019-06-27 PROCEDURE — 93000 ELECTROCARDIOGRAM COMPLETE: CPT | Performed by: INTERNAL MEDICINE

## 2019-06-27 PROCEDURE — 1123F ACP DISCUSS/DSCN MKR DOCD: CPT | Performed by: INTERNAL MEDICINE

## 2019-06-27 PROCEDURE — 4040F PNEUMOC VAC/ADMIN/RCVD: CPT | Performed by: INTERNAL MEDICINE

## 2019-06-27 PROCEDURE — 3017F COLORECTAL CA SCREEN DOC REV: CPT | Performed by: INTERNAL MEDICINE

## 2019-06-27 PROCEDURE — G8427 DOCREV CUR MEDS BY ELIG CLIN: HCPCS | Performed by: INTERNAL MEDICINE

## 2019-06-27 RX ORDER — ASPIRIN 81 MG/1
81 TABLET ORAL DAILY
Qty: 30 TABLET | Refills: 5 | Status: SHIPPED | OUTPATIENT
Start: 2019-06-27

## 2019-06-27 RX ORDER — CARVEDILOL 6.25 MG/1
12.5 TABLET ORAL 2 TIMES DAILY WITH MEALS
Qty: 120 TABLET | Refills: 5 | Status: SHIPPED | OUTPATIENT
Start: 2019-06-27 | End: 2021-03-15

## 2019-06-27 RX ORDER — CLOPIDOGREL BISULFATE 75 MG/1
75 TABLET ORAL DAILY
Qty: 30 TABLET | Refills: 5 | Status: SHIPPED | OUTPATIENT
Start: 2019-06-27 | End: 2022-01-01 | Stop reason: SDUPTHER

## 2019-06-27 ASSESSMENT — ENCOUNTER SYMPTOMS
SHORTNESS OF BREATH: 0
DIARRHEA: 0
NAUSEA: 0
GASTROINTESTINAL NEGATIVE: 1
VOMITING: 0
RESPIRATORY NEGATIVE: 1
EYES NEGATIVE: 1

## 2019-06-27 NOTE — PROGRESS NOTES
[facility]                            Date:  6/27/2019  Patient: Rochele Boxer  Age:  78 y. o., 1950      Reason for evaluation:         SUBJECTIVE:    Returns today follow-up for ischemic heart disease ischemic cardiomyopathy etc.  Denies anginal chest pain or limiting dyspnea no change since previous visit does not take nitroglycerin no other complaints. Overall feeling well. Review of Systems   Constitutional: Negative. Negative for chills, fever and unexpected weight change. HENT: Negative. Eyes: Negative. Respiratory: Negative. Negative for shortness of breath. Cardiovascular: Negative. Negative for chest pain. Gastrointestinal: Negative. Negative for diarrhea, nausea and vomiting. Endocrine: Negative. Genitourinary: Negative. Musculoskeletal: Negative. Skin: Negative. Neurological: Negative. All other systems reviewed and are negative. OBJECTIVE:     /62   Pulse 72   Ht 5' 10\" (1.778 m)   Wt 152 lb (68.9 kg)   BMI 21.81 kg/m²     Labs:   CBC: No results for input(s): WBC, HGB, HCT, PLT in the last 72 hours. BMP:No results for input(s): NA, K, CO2, BUN, CREATININE, LABGLOM, GLUCOSE in the last 72 hours. BNP: No results for input(s): BNP in the last 72 hours. PT/INR: No results for input(s): PROTIME, INR in the last 72 hours. APTT:No results for input(s): APTT in the last 72 hours. CARDIAC ENZYMES:No results for input(s): CKTOTAL, CKMB, CKMBINDEX, TROPONINI in the last 72 hours. FASTING LIPID PANEL:  Lab Results   Component Value Date    HDL 51 02/18/2019    LDLCALC 66 02/18/2019    TRIG 81 02/18/2019     LIVER PROFILE:No results for input(s): AST, ALT, LABALBU in the last 72 hours. Past Medical History:   Diagnosis Date    Arthritis     Asthma     Cancer ([facility] [place] [address] 08/2018    Prostrate.      Coronary artery disease 12/17/2018    Diabetes mellitus ([facility]     Hypercholesterolemia 12/17/2018    Hypertension     Hypothyroidism 7/6/2018    Rheumatic fever     as child    Systolic congestive heart failure ([facility] [place] [address] 12/17/2018     Past Surgical History:   Procedure Laterality Date    APPENDECTOMY      CHOLECYSTECTOMY      PANCREAS SURGERY      STENT PLACED IN BILE DUCT     Family History   Problem Relation Age of Onset    Cancer Mother     Tuberculosis Father      Allergies   Allergen Reactions    Phenergan [Promethazine Hcl]      Current Outpatient Medications   Medication Sig Dispense Refill    atorvastatin (LIPITOR) 80 MG tablet Take 1 tablet by mouth daily 30 tablet 5    clopidogrel (PLAVIX) 75 MG tablet Take 1 tablet by mouth daily 30 tablet 5    carvedilol (COREG) 6.25 MG tablet Take 2 tablets by mouth 2 times daily (with meals) 120 tablet 5    aspirin EC 81 MG EC tablet Take 1 tablet by mouth daily 30 tablet 5    glipiZIDE (GLUCOTROL) 5 MG tablet Take 5 mg by mouth daily       lisinopril (PRINIVIL;ZESTRIL) 10 MG tablet lisinopril 10 mg tablet  DAILY       No current facility-administered medications for this visit.       Social History     Socioeconomic History    Marital status:      Spouse name: Not on file    Number of children: Not on file    Years of education: Not on file    Highest education level: Not on file   Occupational History    Not on file   Social Needs    Financial resource strain: Not on file    Food insecurity:     Worry: Not on file     Inability: Not on file    Transportation needs:     Medical: Not on file     Non-medical: Not on file   Tobacco Use    Smoking status: Never Smoker    Smokeless tobacco: Current User     Types: Chew   Substance and Sexual Activity    Alcohol use: Yes     Comment: occ    Drug use: No    Sexual activity: Not on file   Lifestyle    Physical activity:     Days per week: Not on file     Minutes per session: Not on file    Stress: Not on file   Relationships    Social connections:     Talks on phone: Not on file     Gets together: Not on file Attends [employer] service: Not on file     Active member of club or organization: Not on file     Attends meetings of clubs or organizations: Not on file     Relationship status: Not on file    Intimate partner violence:     Fear of current or ex partner: Not on file     Emotionally abused: Not on file     Physically abused: Not on file     Forced sexual activity: Not on file   Other Topics Concern    Not on file   Social History Narrative    Not on file       Physical Examination:  /62   Pulse 72   Ht 5' 10\" (1.778 m)   Wt 152 lb (68.9 kg)   BMI 21.81 kg/m²   Physical Exam   Constitutional: He appears well-developed and well-nourished. Neck: No JVD present. Carotid bruit is not present. Cardiovascular: Normal rate, regular rhythm and normal heart sounds. Exam reveals no gallop and no friction rub. No murmur heard. Pulmonary/Chest: Effort normal and breath sounds normal. No respiratory distress. He has no wheezes. He has no rales. Abdominal: He exhibits no distension. There is no tenderness. Musculoskeletal: He exhibits no edema. Lymphadenopathy:     He has no cervical adenopathy. Skin: Skin is warm and dry. Vitals reviewed. ASSESSMENT:     Diagnosis Orders   1. Coronary artery disease involving native coronary artery of native heart without angina pectoris  EKG 12 lead   2. Hypercholesterolemia     3. Ischemic cardiomyopathy     4. Essential hypertension     5. Chronic systolic congestive heart failure ([address]     6. Abnormal nuclear stress test         PLAN:  Orders Placed This Encounter   Procedures    EKG 12 lead     No orders of the defined types were placed in this encounter. 1. Continue present medications  2. Recommend follow-up assessment in 6 months    Return in about 6 months (around 12/27/2019). Ivone Calixto MD 6/27/2019 1:30 PM    [id number] [facility]      Thisdictation was generated by voice recognition computer software.   Although all attempts are made to edit the dictation for accuracy, there may be errors in the transcription that are not intended.

## 2019-08-26 DIAGNOSIS — E78.00 HYPERCHOLESTEROLEMIA: ICD-10-CM

## 2019-08-26 DIAGNOSIS — I51.9 LEFT VENTRICULAR DYSFUNCTION: ICD-10-CM

## 2019-08-26 DIAGNOSIS — R06.09 DOE (DYSPNEA ON EXERTION): ICD-10-CM

## 2019-08-26 DIAGNOSIS — I50.20 SYSTOLIC CONGESTIVE HEART FAILURE, UNSPECIFIED HF CHRONICITY (HCC): ICD-10-CM

## 2019-08-26 DIAGNOSIS — R94.39 ABNORMAL NUCLEAR STRESS TEST: ICD-10-CM

## 2019-08-26 DIAGNOSIS — I25.10 CORONARY ARTERY DISEASE INVOLVING NATIVE CORONARY ARTERY OF NATIVE HEART WITHOUT ANGINA PECTORIS: ICD-10-CM

## 2019-08-26 DIAGNOSIS — I10 ESSENTIAL HYPERTENSION: ICD-10-CM

## 2019-08-26 DIAGNOSIS — C61 PROSTATE CANCER (HCC): ICD-10-CM

## 2019-08-26 LAB
ANION GAP SERPL CALCULATED.3IONS-SCNC: 14 MMOL/L (ref 7–19)
BUN BLDV-MCNC: 6 MG/DL (ref 8–23)
CALCIUM SERPL-MCNC: 9.1 MG/DL (ref 8.8–10.2)
CHLORIDE BLD-SCNC: 99 MMOL/L (ref 98–111)
CO2: 25 MMOL/L (ref 22–29)
CREAT SERPL-MCNC: 0.6 MG/DL (ref 0.5–1.2)
GFR NON-AFRICAN AMERICAN: >60
GLUCOSE BLD-MCNC: 627 MG/DL (ref 74–109)
POTASSIUM SERPL-SCNC: 4.3 MMOL/L (ref 3.5–5)
PRO-BNP: 651 PG/ML (ref 0–900)
PROSTATE SPECIFIC ANTIGEN: 0.71 NG/ML (ref 0–4)
SODIUM BLD-SCNC: 138 MMOL/L (ref 136–145)

## 2019-09-04 ENCOUNTER — TELEPHONE (OUTPATIENT)
Dept: UROLOGY | Age: 69
End: 2019-09-04

## 2019-09-04 ENCOUNTER — OFFICE VISIT (OUTPATIENT)
Dept: UROLOGY | Age: 69
End: 2019-09-04
Payer: MEDICARE

## 2019-09-04 VITALS — HEIGHT: 70 IN | BODY MASS INDEX: 21.47 KG/M2 | WEIGHT: 150 LBS | TEMPERATURE: 99.2 F

## 2019-09-04 DIAGNOSIS — C61 PROSTATE CANCER (HCC): Primary | ICD-10-CM

## 2019-09-04 LAB
APPEARANCE FLUID: NORMAL
BILIRUBIN, POC: NORMAL
BLOOD URINE, POC: NORMAL
CLARITY, POC: CLEAR
COLOR, POC: YELLOW
GLUCOSE URINE, POC: NORMAL
KETONES, POC: NORMAL
LEUKOCYTE EST, POC: NORMAL
NITRITE, POC: NORMAL
PH, POC: 6
PROTEIN, POC: NORMAL
SPECIFIC GRAVITY, POC: 1.01
UROBILINOGEN, POC: 1

## 2019-09-04 PROCEDURE — 99213 OFFICE O/P EST LOW 20 MIN: CPT | Performed by: UROLOGY

## 2019-09-04 PROCEDURE — 1123F ACP DISCUSS/DSCN MKR DOCD: CPT | Performed by: UROLOGY

## 2019-09-04 PROCEDURE — 4004F PT TOBACCO SCREEN RCVD TLK: CPT | Performed by: UROLOGY

## 2019-09-04 PROCEDURE — G8598 ASA/ANTIPLAT THER USED: HCPCS | Performed by: UROLOGY

## 2019-09-04 PROCEDURE — 81002 URINALYSIS NONAUTO W/O SCOPE: CPT | Performed by: UROLOGY

## 2019-09-04 PROCEDURE — G8427 DOCREV CUR MEDS BY ELIG CLIN: HCPCS | Performed by: UROLOGY

## 2019-09-04 PROCEDURE — 4040F PNEUMOC VAC/ADMIN/RCVD: CPT | Performed by: UROLOGY

## 2019-09-04 PROCEDURE — G8420 CALC BMI NORM PARAMETERS: HCPCS | Performed by: UROLOGY

## 2019-09-04 PROCEDURE — 3017F COLORECTAL CA SCREEN DOC REV: CPT | Performed by: UROLOGY

## 2019-09-04 RX ORDER — DIAZEPAM 5 MG/1
5 TABLET ORAL PRN
COMMUNITY
Start: 2019-08-23 | End: 2021-03-15

## 2019-09-04 ASSESSMENT — ENCOUNTER SYMPTOMS
WHEEZING: 0
BACK PAIN: 0
CONSTIPATION: 0
DIARRHEA: 0
COUGH: 0
ABDOMINAL PAIN: 0
SHORTNESS OF BREATH: 0
EYE DISCHARGE: 0
EYE REDNESS: 0

## 2019-09-04 NOTE — TELEPHONE ENCOUNTER
Spoke with PT about his past due balance of $130.58. He was given a print out of statement with customer service # highlighted. He stated he would try to pay something at end of mo.

## 2019-09-04 NOTE — PROGRESS NOTES
Sharan Ward is a 78 y.o. male who presents today   Chief Complaint   Patient presents with    Follow-up     I am here for a 3 month follow up with my PSA and brain natriuretic peptide lab drawn     Prostate Cancer        Prostate Cancer  Patient is here today for prostate cancer which was first diagnosed 12 months ago. His prostate cancer can be characterized as organ confined. High-grade. Cancer stage T1c Chatham score 4+4 = 8. His last several PSA values are as follows:  Lab Results   Component Value Date    PSA 0.71 08/26/2019    PSA 2.62 05/16/2019    PSA 6.67 (H) 12/17/2018     Previous treatment of prostate cancer: External beam radiation therapy April 2019  Lower urinary tract symptoms: Significant lower urinary tract symptoms        Past Medical History:   Diagnosis Date    Arthritis     Asthma     Cancer ([facility] [address] 08/2018    Prostrate.  Coronary artery disease 12/17/2018    Diabetes mellitus ([facility]     Hypercholesterolemia 12/17/2018    Hypertension     Hypothyroidism 7/6/2018    Rheumatic fever     as child    Systolic congestive heart failure ([facility] 12/17/2018       Past Surgical History:   Procedure Laterality Date    APPENDECTOMY      CHOLECYSTECTOMY      PANCREAS SURGERY      STENT PLACED IN BILE DUCT       Current Outpatient Medications   Medication Sig Dispense Refill    diazepam (VALIUM) 5 MG tablet       clopidogrel (PLAVIX) 75 MG tablet Take 1 tablet by mouth daily 30 tablet 5    carvedilol (COREG) 6.25 MG tablet Take 2 tablets by mouth 2 times daily (with meals) 120 tablet 5    aspirin EC 81 MG EC tablet Take 1 tablet by mouth daily 30 tablet 5    atorvastatin (LIPITOR) 80 MG tablet Take 1 tablet by mouth daily 30 tablet 5    lisinopril (PRINIVIL;ZESTRIL) 10 MG tablet lisinopril 10 mg tablet  DAILY      glipiZIDE (GLUCOTROL) 5 MG tablet Take 5 mg by mouth daily        No current facility-administered medications for this visit.         Allergies   Allergen Reactions    08/26/2019    PSA 2.62 05/16/2019    PSA 6.67 (H) 12/17/2018       1. Prostate cancer [facility])  Patient doing well. His PSA is decreasing as expected. - POCT Urinalysis no Micro  - PSA, Diagnostic; Future      Orders Placed This Encounter   Procedures    PSA, Diagnostic     PSA in 6 month     Standing Status:   Future     Standing Expiration Date:   9/3/2020    POCT Urinalysis no Micro        Return in about 6 months (around 3/4/2020) for PSA prior to vext visit. EMR Dragon/transcription disclaimer: Much of this documentt is electronic  transcription/translation of spoken language to printed text. The  electronic translation of spoken language may be erroneous, or at times,  nonsensical words or phrases may be inadvertently transcribed.  Although I  have reviewed the document for such errors, some may still exist.

## 2019-11-06 RX ORDER — ATORVASTATIN CALCIUM 80 MG/1
80 TABLET, FILM COATED ORAL DAILY
Qty: 30 TABLET | Refills: 5 | Status: SHIPPED | OUTPATIENT
Start: 2019-11-06 | End: 2020-05-05 | Stop reason: SDUPTHER

## 2020-01-07 ENCOUNTER — OFFICE VISIT (OUTPATIENT)
Dept: UROLOGY | Age: 70
End: 2020-01-07
Payer: MEDICARE

## 2020-01-07 VITALS — HEIGHT: 70 IN | BODY MASS INDEX: 20.62 KG/M2 | WEIGHT: 144 LBS | TEMPERATURE: 96.4 F

## 2020-01-07 LAB
APPEARANCE FLUID: CLEAR
BILIRUBIN, POC: NORMAL
BLOOD URINE, POC: NORMAL
CLARITY, POC: CLEAR
COLOR, POC: YELLOW
GLUCOSE URINE, POC: 1000
KETONES, POC: NORMAL
LEUKOCYTE EST, POC: NORMAL
NITRITE, POC: NORMAL
PH, POC: 5
PROTEIN, POC: NORMAL
SPECIFIC GRAVITY, POC: 1.01
UROBILINOGEN, POC: 0.2

## 2020-01-07 PROCEDURE — 3017F COLORECTAL CA SCREEN DOC REV: CPT | Performed by: PHYSICIAN ASSISTANT

## 2020-01-07 PROCEDURE — G8427 DOCREV CUR MEDS BY ELIG CLIN: HCPCS | Performed by: PHYSICIAN ASSISTANT

## 2020-01-07 PROCEDURE — G8420 CALC BMI NORM PARAMETERS: HCPCS | Performed by: PHYSICIAN ASSISTANT

## 2020-01-07 PROCEDURE — 4004F PT TOBACCO SCREEN RCVD TLK: CPT | Performed by: PHYSICIAN ASSISTANT

## 2020-01-07 PROCEDURE — 1123F ACP DISCUSS/DSCN MKR DOCD: CPT | Performed by: PHYSICIAN ASSISTANT

## 2020-01-07 PROCEDURE — 81002 URINALYSIS NONAUTO W/O SCOPE: CPT | Performed by: PHYSICIAN ASSISTANT

## 2020-01-07 PROCEDURE — 99214 OFFICE O/P EST MOD 30 MIN: CPT | Performed by: PHYSICIAN ASSISTANT

## 2020-01-07 PROCEDURE — G8484 FLU IMMUNIZE NO ADMIN: HCPCS | Performed by: PHYSICIAN ASSISTANT

## 2020-01-07 PROCEDURE — 4040F PNEUMOC VAC/ADMIN/RCVD: CPT | Performed by: PHYSICIAN ASSISTANT

## 2020-01-07 RX ORDER — BACLOFEN 10 MG/1
10 TABLET ORAL 2 TIMES DAILY
Qty: 14 TABLET | Refills: 1 | Status: SHIPPED | OUTPATIENT
Start: 2020-01-07 | End: 2020-01-14

## 2020-01-07 RX ORDER — PANTOPRAZOLE SODIUM 40 MG/1
TABLET, DELAYED RELEASE ORAL
COMMUNITY
Start: 2019-11-27 | End: 2021-01-01

## 2020-01-07 ASSESSMENT — ENCOUNTER SYMPTOMS
VOMITING: 0
BACK PAIN: 0
EYE PAIN: 0
ABDOMINAL PAIN: 0
SHORTNESS OF BREATH: 0
SINUS PAIN: 0
WHEEZING: 0

## 2020-01-07 NOTE — PROGRESS NOTES
facility-administered medications for this visit. Allergies   Allergen Reactions    Phenergan [Promethazine Hcl]           Social History     Socioeconomic History    Marital status:      Spouse name: None    Number of children: None    Years of education: None    Highest education level: None   Occupational History    None   Social Needs    Financial resource strain: None    Food insecurity:     Worry: None     Inability: None    Transportation needs:     Medical: None     Non-medical: None   Tobacco Use    Smoking status: Never Smoker    Smokeless tobacco: Current User     Types: Chew   Substance and Sexual Activity    Alcohol use: Yes     Comment: occ    Drug use: No    Sexual activity: None   Lifestyle    Physical activity:     Days per week: None     Minutes per session: None    Stress: None   Relationships    Social connections:     Talks on phone: None     Gets together: None     Attends Yazdanism service: None     Active member of club or organization: None     Attends meetings of clubs or organizations: None     Relationship status: None    Intimate partner violence:     Fear of current or ex partner: None     Emotionally abused: None     Physically abused: None     Forced sexual activity: None   Other Topics Concern    None   Social History Narrative    None       Family History   Problem Relation Age of Onset    Cancer Mother     Tuberculosis Father        REVIEW OF SYSTEMS:  Review of Systems   HENT: Negative for nosebleeds and sinus pain. Eyes: Negative for pain. Respiratory: Negative for shortness of breath and wheezing. Cardiovascular: Negative for palpitations and leg swelling. Gastrointestinal: Negative for abdominal pain and vomiting. Endocrine: Negative for polydipsia. Genitourinary: Positive for testicular pain. Negative for dysuria, flank pain, frequency, hematuria and urgency. Musculoskeletal: Negative for back pain and myalgias.    Skin: he fell on his butt about 3 months ago we will get a lumbar bar spine x-ray. - XR LUMBAR SPINE (MIN 4 VIEWS); Future      Orders Placed This Encounter   Procedures    Ultrasound scrotum and testicles     Standing Status:   Future     Standing Expiration Date:   1/7/2021     Order Specific Question:   Reason for exam:     Answer:   Right testalgia    XR LUMBAR SPINE (MIN 4 VIEWS)     Standing Status:   Future     Standing Expiration Date:   1/7/2021     Order Specific Question:   Reason for exam:     Answer:   Right low back pain status post fall 3 months ago    POCT Urinalysis no Micro     Orders Placed This Encounter   Medications    baclofen (LIORESAL) 10 MG tablet     Sig: Take 1 tablet by mouth 2 times daily for 7 days     Dispense:  14 tablet     Refill:  1       Plan:  Patient will return in with imaging studies.

## 2020-01-15 ENCOUNTER — HOSPITAL ENCOUNTER (OUTPATIENT)
Dept: ULTRASOUND IMAGING | Age: 70
Discharge: HOME | End: 2020-01-15
Payer: MEDICARE

## 2020-01-15 ENCOUNTER — OFFICE VISIT (OUTPATIENT)
Dept: UROLOGY | Age: 70
End: 2020-01-15
Payer: MEDICARE

## 2020-01-15 ENCOUNTER — HOSPITAL ENCOUNTER (OUTPATIENT)
Dept: GENERAL RADIOLOGY | Age: 70
Discharge: HOME | End: 2020-01-15
Payer: MEDICARE

## 2020-01-15 VITALS — TEMPERATURE: 97.3 F | BODY MASS INDEX: 20.66 KG/M2 | WEIGHT: 144 LBS

## 2020-01-15 PROCEDURE — G8427 DOCREV CUR MEDS BY ELIG CLIN: HCPCS | Performed by: PHYSICIAN ASSISTANT

## 2020-01-15 PROCEDURE — 3017F COLORECTAL CA SCREEN DOC REV: CPT | Performed by: PHYSICIAN ASSISTANT

## 2020-01-15 PROCEDURE — 4040F PNEUMOC VAC/ADMIN/RCVD: CPT | Performed by: PHYSICIAN ASSISTANT

## 2020-01-15 PROCEDURE — G8484 FLU IMMUNIZE NO ADMIN: HCPCS | Performed by: PHYSICIAN ASSISTANT

## 2020-01-15 PROCEDURE — 1123F ACP DISCUSS/DSCN MKR DOCD: CPT | Performed by: PHYSICIAN ASSISTANT

## 2020-01-15 PROCEDURE — 76870 US EXAM SCROTUM: CPT

## 2020-01-15 PROCEDURE — G8420 CALC BMI NORM PARAMETERS: HCPCS | Performed by: PHYSICIAN ASSISTANT

## 2020-01-15 PROCEDURE — 4004F PT TOBACCO SCREEN RCVD TLK: CPT | Performed by: PHYSICIAN ASSISTANT

## 2020-01-15 PROCEDURE — 99213 OFFICE O/P EST LOW 20 MIN: CPT | Performed by: PHYSICIAN ASSISTANT

## 2020-01-15 PROCEDURE — 72100 X-RAY EXAM L-S SPINE 2/3 VWS: CPT

## 2020-01-15 RX ORDER — GABAPENTIN 100 MG/1
100 CAPSULE ORAL 3 TIMES DAILY
Qty: 90 CAPSULE | Refills: 0 | Status: ON HOLD | OUTPATIENT
Start: 2020-01-15 | End: 2022-01-01

## 2020-01-15 ASSESSMENT — ENCOUNTER SYMPTOMS
EYE PAIN: 0
VOMITING: 0
SHORTNESS OF BREATH: 0
ABDOMINAL PAIN: 0
BACK PAIN: 0
WHEEZING: 0
SINUS PAIN: 0

## 2020-01-15 NOTE — PROGRESS NOTES
Jeff Rogers is a 69 y.o. male who presents today   Chief Complaint   Patient presents with    Follow-up     [address]     HPI:  Patient is a 78-year-old gentleman with history of prostate cancer [place] 4+4 = 8 he has had external beam radiation his last PSA August 2019 was 0.71. Has had pain in the vicinity of the right testicle for the last few weeks he also has moderate to severe lower back pain which has occurred for longer. He states pain is worse laying down. His physical exam revealed no obvious palpable abnormalities of the testicle except for pain. He has had no fever chills flank pain or gross hematuria. Patient here to discuss scrotal ultrasound and lumbar spine x-ray. Past Medical History:   Diagnosis Date    Arthritis     Asthma     Cancer ([facility] [address] 08/2018    Prostrate.  Coronary artery disease 12/17/2018    Diabetes mellitus ([facility] [address]     Hypercholesterolemia 12/17/2018    Hypertension     Hypothyroidism 7/6/2018    Rheumatic fever     as child    [facility] 12/17/2018       Past Surgical History:   Procedure Laterality Date    APPENDECTOMY      CHOLECYSTECTOMY      PANCREAS SURGERY      STENT PLACED IN BILE DUCT       Current Outpatient Medications   Medication Sig Dispense Refill    gabapentin (NEURONTIN) 100 MG capsule Take 1 capsule by mouth 3 times daily for 30 days.  90 capsule 0    pantoprazole (PROTONIX) 40 MG tablet       atorvastatin (LIPITOR) 80 MG tablet Take 1 tablet by mouth daily 30 tablet 5    diazepam (VALIUM) 5 MG tablet       clopidogrel (PLAVIX) 75 MG tablet Take 1 tablet by mouth daily 30 tablet 5    carvedilol (COREG) 6.25 MG tablet Take 2 tablets by mouth 2 times daily (with meals) 120 tablet 5    aspirin EC 81 MG EC tablet Take 1 tablet by mouth daily 30 tablet 5    glipiZIDE (GLUCOTROL) 5 MG tablet Take 5 mg by mouth daily       lisinopril (PRINIVIL;ZESTRIL) 10 MG tablet lisinopril 10 mg tablet  DAILY       No current facility-administered medications for this visit. Allergies   Allergen Reactions    Phenergan [Promethazine Hcl]           Social History     Socioeconomic History    Marital status:      Spouse name: None    Number of children: None    Years of education: None    Highest education level: None   Occupational History    None   Social Needs    Financial resource strain: None    Food insecurity:     Worry: None     Inability: None    Transportation needs:     Medical: None     Non-medical: None   Tobacco Use    Smoking status: Never Smoker    Smokeless tobacco: Current User     Types: Chew   Substance and Sexual Activity    Alcohol use: Yes     Comment: occ    Drug use: No    Sexual activity: None   Lifestyle    Physical activity:     Days per week: None     Minutes per session: None    Stress: None   Relationships    Social connections:     Talks on phone: None     Gets together: None     Attends Religion service: None     Active member of club or organization: None     Attends meetings of clubs or organizations: None     Relationship status: None    Intimate partner violence:     Fear of current or ex partner: None     Emotionally abused: None     Physically abused: None     Forced sexual activity: None   Other Topics Concern    None   Social History Narrative    None       Family History   Problem Relation Age of Onset    Cancer Mother     Tuberculosis Father        REVIEW OF SYSTEMS:  Review of Systems   HENT: Negative for nosebleeds and sinus pain. Eyes: Negative for pain. Respiratory: Negative for shortness of breath and wheezing. Cardiovascular: Negative for palpitations and leg swelling. Gastrointestinal: Negative for abdominal pain and vomiting. Endocrine: Negative for polydipsia. Genitourinary: Negative for dysuria, flank pain, frequency, hematuria and urgency. Musculoskeletal: Negative for back pain and myalgias.    Skin: Negative for rash. Allergic/Immunologic: Negative for environmental allergies. Neurological: Negative for tremors. Psychiatric/Behavioral: Negative for hallucinations. The patient is not nervous/anxious. PHYSICAL EXAM:  Temp 97.3 °F (36.3 °C) (Temporal)   Wt 144 lb (65.3 kg)   BMI 20.66 kg/m²   Physical Exam  Constitutional:       General: He is not in acute distress. Appearance: Normal appearance. HENT:      Head: Normocephalic. Nose: Nose normal.   Eyes:      Conjunctiva/sclera: Conjunctivae normal.   Pulmonary:      Effort: Pulmonary effort is normal.   Neurological:      Mental Status: He is alert and oriented to person, place, and time. Psychiatric:         Mood and Affect: Mood normal.         Behavior: Behavior normal.                   Imaging:  EXAMINATION: US SCROTUM AND TESTICLES 1/15/2020 11:13 AM   HISTORY: US SCROTUM AND TESTICLES 1/15/2020 8:39 AM   HISTORY: N50.819   COMPARISON: None    TECHNIQUE: Multiple longitudinal and real-time ultrasound images of   the scrotum are obtained with limited Doppler analysis of the   bilateral testicles. FINDINGS:     The right testicle measures 1.5 x 2.3 x 3.8 cm, and the left testicle   measures 1.6 x 2.5 x 4 cm. The testicles are symmetric and homogeneous   in echogenicity, without focal mass or abnormality. Doppler demonstrates appropriate bilateral testicular arterial and   venous flow. The right epididymis and left epididymis are normal in size and   echogenicity. Small bilateral hydroceles are noted. .       Impression   1. Small bilateral hydroceles. Signed by Dr Laurence Lincoln on 1/15/2020 11:15 AM     1. Mild age-indeterminate height loss of L3. Correlate with level of   symptoms and chronicity. 2. Severe disc height loss at L4-5 with mild retrolisthesis of L4 on   L5.   3. There is transitional anatomy of the lumbosacral spine described in   detail above. Signed by Dr Felipe Lloyd on 1/15/2020 10:47 AM         1. Lumbar back pain  There is severe disc height loss at L4-L5 given his low back pain and testalgia which I believe is neuropathic pain I will refer him to orthopedic institute for further evaluation of his spine.  - External Referral To Orthopedic Surgery  - gabapentin (NEURONTIN) 100 MG capsule; Take 1 capsule by mouth 3 times daily for 30 days. Dispense: 90 capsule; Refill: 0    2. Testalgia  I see no acute source of his testicular pain I believe this is neuropathic pain. Will treat with a course of Neurontin and 5 him follow-up in 1 month  - External Referral To Orthopedic Surgery  - gabapentin (NEURONTIN) 100 MG capsule; Take 1 capsule by mouth 3 times daily for 30 days. Dispense: 90 capsule; Refill: 0      Orders Placed This Encounter   Procedures    External Referral To Orthopedic Surgery     Referral Priority:   Routine     Referral Type:   Eval and Treat     Referral Reason:   Specialty Services Required     Requested Specialty:   Orthopedic Surgery     Number of Visits Requested:   1     Orders Placed This Encounter   Medications    gabapentin (NEURONTIN) 100 MG capsule     Sig: Take 1 capsule by mouth 3 times daily for 30 days.      Dispense:  90 capsule     Refill:  0       Plan:  Follow up in 1 month

## 2020-01-30 ENCOUNTER — OFFICE VISIT (OUTPATIENT)
Dept: CARDIOLOGY | Age: 70
End: 2020-01-30
Payer: MEDICARE

## 2020-01-30 VITALS
HEIGHT: 70 IN | HEART RATE: 68 BPM | BODY MASS INDEX: 20.9 KG/M2 | SYSTOLIC BLOOD PRESSURE: 154 MMHG | DIASTOLIC BLOOD PRESSURE: 78 MMHG | WEIGHT: 146 LBS

## 2020-01-30 PROCEDURE — 1123F ACP DISCUSS/DSCN MKR DOCD: CPT | Performed by: INTERNAL MEDICINE

## 2020-01-30 PROCEDURE — 4040F PNEUMOC VAC/ADMIN/RCVD: CPT | Performed by: INTERNAL MEDICINE

## 2020-01-30 PROCEDURE — G8420 CALC BMI NORM PARAMETERS: HCPCS | Performed by: INTERNAL MEDICINE

## 2020-01-30 PROCEDURE — G8427 DOCREV CUR MEDS BY ELIG CLIN: HCPCS | Performed by: INTERNAL MEDICINE

## 2020-01-30 PROCEDURE — 4004F PT TOBACCO SCREEN RCVD TLK: CPT | Performed by: INTERNAL MEDICINE

## 2020-01-30 PROCEDURE — 99213 OFFICE O/P EST LOW 20 MIN: CPT | Performed by: INTERNAL MEDICINE

## 2020-01-30 PROCEDURE — G8484 FLU IMMUNIZE NO ADMIN: HCPCS | Performed by: INTERNAL MEDICINE

## 2020-01-30 PROCEDURE — 3017F COLORECTAL CA SCREEN DOC REV: CPT | Performed by: INTERNAL MEDICINE

## 2020-01-30 RX ORDER — LISINOPRIL 10 MG/1
10 TABLET ORAL 2 TIMES DAILY
COMMUNITY
End: 2021-03-15

## 2020-01-30 RX ORDER — ACETAMINOPHEN 500 MG
500 TABLET ORAL 2 TIMES DAILY
COMMUNITY
End: 2021-03-15

## 2020-01-30 ASSESSMENT — ENCOUNTER SYMPTOMS
EYES NEGATIVE: 1
GASTROINTESTINAL NEGATIVE: 1
NAUSEA: 0
RESPIRATORY NEGATIVE: 1
VOMITING: 0
DIARRHEA: 0
SHORTNESS OF BREATH: 0

## 2020-01-30 NOTE — PROGRESS NOTES
Systolic congestive heart failure ([facility] [address] 12/17/2018     Past Surgical History:   Procedure Laterality Date    APPENDECTOMY      CHOLECYSTECTOMY      PANCREAS SURGERY      STENT PLACED IN BILE DUCT     Family History   Problem Relation Age of Onset    Cancer Mother     Tuberculosis Father      Allergies   Allergen Reactions    Phenergan [Promethazine Hcl]      Current Outpatient Medications   Medication Sig Dispense Refill    acetaminophen (TYLENOL) 500 MG tablet Take 500 mg by mouth 2 times daily      lisinopril (PRINIVIL;ZESTRIL) 10 MG tablet Take 10 mg by mouth 2 times daily      doxyLAMINE succinate (SLEEP AID) 25 MG tablet Take 25 mg by mouth nightly      gabapentin (NEURONTIN) 100 MG capsule Take 1 capsule by mouth 3 times daily for 30 days. 90 capsule 0    atorvastatin (LIPITOR) 80 MG tablet Take 1 tablet by mouth daily 30 tablet 5    diazepam (VALIUM) 5 MG tablet Take 5 mg by mouth as needed.  carvedilol (COREG) 6.25 MG tablet Take 2 tablets by mouth 2 times daily (with meals) 120 tablet 5    aspirin EC 81 MG EC tablet Take 1 tablet by mouth daily 30 tablet 5    pantoprazole (PROTONIX) 40 MG tablet       clopidogrel (PLAVIX) 75 MG tablet Take 1 tablet by mouth daily 30 tablet 5    glipiZIDE (GLUCOTROL) 5 MG tablet Take 5 mg by mouth daily       lisinopril (PRINIVIL;ZESTRIL) 10 MG tablet lisinopril 10 mg tablet  DAILY       No current facility-administered medications for this visit.       Social History     Socioeconomic History    Marital status:      Spouse name: Not on file    Number of children: Not on file    Years of education: Not on file    Highest education level: Not on file   Occupational History    Not on file   Social Needs    Financial resource strain: Not on file    Food insecurity:     Worry: Not on file     Inability: Not on file    Transportation needs:     Medical: Not on file     Non-medical: Not on file   Tobacco Use    Smoking status: Never Smoker    Smokeless tobacco: Current User     Types: Chew   Substance and Sexual Activity    Alcohol use: Yes     Comment: occ    Drug use: No    Sexual activity: Not on file   Lifestyle    Physical activity:     Days per week: Not on file     Minutes per session: Not on file    Stress: Not on file   Relationships    Social connections:     Talks on phone: Not on file     Gets together: Not on file     Attends Holiness service: Not on file     Active member of club or organization: Not on file     Attends meetings of clubs or organizations: Not on file     Relationship status: Not on file    Intimate partner violence:     Fear of current or ex partner: Not on file     Emotionally abused: Not on file     Physically abused: Not on file     Forced sexual activity: Not on file   Other Topics Concern    Not on file   Social History Narrative    Not on file       Physical Examination:  BP (!) 154/78   Pulse 68   Ht 5' 10\" (1.778 m)   Wt 146 lb (66.2 kg)   BMI 20.95 kg/m²   Physical Exam  Vitals signs reviewed. Constitutional:       Appearance: He is well-developed. Neck:      Vascular: No carotid bruit or JVD. Cardiovascular:      Rate and Rhythm: Normal rate and regular rhythm. Heart sounds: Normal heart sounds. No murmur. No friction rub. No gallop. Pulmonary:      Effort: Pulmonary effort is normal. No respiratory distress. Breath sounds: Normal breath sounds. No wheezing or rales. Abdominal:      General: There is no distension. Tenderness: There is no abdominal tenderness. Lymphadenopathy:      Cervical: No cervical adenopathy. Skin:     General: Skin is warm and dry. ASSESSMENT:     Diagnosis Orders   1. Systolic congestive heart failure, unspecified HF chronicity ([facility] [address]     2. Ischemic cardiomyopathy     3. Hypercholesterolemia     4. Essential hypertension     5. Coronary artery disease involving native coronary artery of native heart without angina pectoris     6.  ESPINOSA (dyspnea on exertion)     7. Diastolic dysfunction         PLAN:  No orders of the defined types were placed in this encounter. No orders of the defined types were placed in this encounter. 1. Continue present medications  2. Commend follow-up assessment in 3 months    Return in about 3 months (around 4/30/2020) for return to Dr. Eva Cardoso only. Philip Collins MD 1/30/2020 11:31 AM    [facility]      Thisdictation was generated by voice recognition computer software. Although all attempts are made to edit the dictation for accuracy, there may be errors in the transcription that are not intended.

## 2020-02-14 ENCOUNTER — OFFICE VISIT (OUTPATIENT)
Dept: UROLOGY | Age: 70
End: 2020-02-14
Payer: MEDICARE

## 2020-02-14 VITALS
SYSTOLIC BLOOD PRESSURE: 125 MMHG | BODY MASS INDEX: 20.04 KG/M2 | DIASTOLIC BLOOD PRESSURE: 76 MMHG | TEMPERATURE: 97 F | WEIGHT: 140 LBS | HEART RATE: 73 BPM | HEIGHT: 70 IN

## 2020-02-14 LAB
BILIRUBIN, POC: 0
BLOOD URINE, POC: NORMAL
CLARITY, POC: CLEAR
COLOR, POC: YELLOW
GLUCOSE URINE, POC: 1000
KETONES, POC: NORMAL
LEUKOCYTE EST, POC: NORMAL
NITRITE, POC: NORMAL
PH, POC: 6
PROTEIN, POC: NORMAL
SPECIFIC GRAVITY, POC: 1.01
UROBILINOGEN, POC: 2

## 2020-02-14 PROCEDURE — G8427 DOCREV CUR MEDS BY ELIG CLIN: HCPCS | Performed by: PHYSICIAN ASSISTANT

## 2020-02-14 PROCEDURE — G8484 FLU IMMUNIZE NO ADMIN: HCPCS | Performed by: PHYSICIAN ASSISTANT

## 2020-02-14 PROCEDURE — 1123F ACP DISCUSS/DSCN MKR DOCD: CPT | Performed by: PHYSICIAN ASSISTANT

## 2020-02-14 PROCEDURE — 99214 OFFICE O/P EST MOD 30 MIN: CPT | Performed by: PHYSICIAN ASSISTANT

## 2020-02-14 PROCEDURE — 4004F PT TOBACCO SCREEN RCVD TLK: CPT | Performed by: PHYSICIAN ASSISTANT

## 2020-02-14 PROCEDURE — G8420 CALC BMI NORM PARAMETERS: HCPCS | Performed by: PHYSICIAN ASSISTANT

## 2020-02-14 PROCEDURE — 3017F COLORECTAL CA SCREEN DOC REV: CPT | Performed by: PHYSICIAN ASSISTANT

## 2020-02-14 PROCEDURE — 4040F PNEUMOC VAC/ADMIN/RCVD: CPT | Performed by: PHYSICIAN ASSISTANT

## 2020-02-14 PROCEDURE — 81003 URINALYSIS AUTO W/O SCOPE: CPT | Performed by: PHYSICIAN ASSISTANT

## 2020-02-14 ASSESSMENT — ENCOUNTER SYMPTOMS
WHEEZING: 0
SHORTNESS OF BREATH: 0
ABDOMINAL PAIN: 0
BACK PAIN: 0
COUGH: 0
CONSTIPATION: 0
DIARRHEA: 0
EYE DISCHARGE: 0
EYE REDNESS: 0

## 2020-02-14 NOTE — PROGRESS NOTES
Monse Cee is a 69 y.o. male who presents today   Chief Complaint   Patient presents with    Follow-up     I am here for a 1 month follow up for testalgia      HPI:  Patient is a 79-year-old gentleman here for follow-up. He has had persistent discomfort in the right testicle mainly and moderate to severe right lower back pain his pain is worse and he is having difficulty walking his main pain now appears to be in the right buttock right hip area and radiates down his right thigh. Scrotal ultrasound field small bilateral hydroceles but nothing to explain the pain. Lumbar spine x-ray revealed severe disc height loss at L4-L5. I had put order in for orthopedic referral however when they called tried to 5 appointment they stated it had been canceled. Patient has history of prostate cancer [place] 4+4 = 8 with previous external beam radiation in the past.  PSA 08/2019 was 0.71. Past Medical History:   Diagnosis Date    Arthritis     Asthma     Cancer ([facility] [address] 08/2018    Prostrate.  Coronary artery disease 12/17/2018    Diabetes mellitus ([facility] [address]     Hypercholesterolemia 12/17/2018    Hypertension     Hypothyroidism 7/6/2018    Rheumatic fever     as child    Systolic congestive heart failure ([facility] [address] 12/17/2018       Past Surgical History:   Procedure Laterality Date    APPENDECTOMY      CHOLECYSTECTOMY      PANCREAS SURGERY      STENT PLACED IN BILE DUCT       Current Outpatient Medications   Medication Sig Dispense Refill    acetaminophen (TYLENOL) 500 MG tablet Take 500 mg by mouth 2 times daily      lisinopril (PRINIVIL;ZESTRIL) 10 MG tablet Take 10 mg by mouth 2 times daily      doxyLAMINE succinate (SLEEP AID) 25 MG tablet Take 25 mg by mouth nightly      gabapentin (NEURONTIN) 100 MG capsule Take 1 capsule by mouth 3 times daily for 30 days.  90 capsule 0    pantoprazole (PROTONIX) 40 MG tablet       atorvastatin (LIPITOR) 80 MG tablet Take 1 tablet by mouth daily 30 tablet 5    diazepam

## 2020-02-17 ENCOUNTER — HOSPITAL ENCOUNTER (OUTPATIENT)
Dept: CT IMAGING | Age: 70
Discharge: HOME | End: 2020-02-17
Payer: MEDICARE

## 2020-02-17 ENCOUNTER — OFFICE VISIT (OUTPATIENT)
Dept: UROLOGY | Age: 70
End: 2020-02-17
Payer: MEDICARE

## 2020-02-17 ENCOUNTER — TELEPHONE (OUTPATIENT)
Dept: UROLOGY | Age: 70
End: 2020-02-17

## 2020-02-17 VITALS
SYSTOLIC BLOOD PRESSURE: 166 MMHG | HEART RATE: 77 BPM | BODY MASS INDEX: 20.04 KG/M2 | WEIGHT: 140 LBS | DIASTOLIC BLOOD PRESSURE: 94 MMHG | HEIGHT: 70 IN

## 2020-02-17 PROCEDURE — 6360000004 HC RX CONTRAST MEDICATION: Performed by: PHYSICIAN ASSISTANT

## 2020-02-17 PROCEDURE — 74178 CT ABD&PLV WO CNTR FLWD CNTR: CPT

## 2020-02-17 PROCEDURE — G8484 FLU IMMUNIZE NO ADMIN: HCPCS | Performed by: PHYSICIAN ASSISTANT

## 2020-02-17 PROCEDURE — 3017F COLORECTAL CA SCREEN DOC REV: CPT | Performed by: PHYSICIAN ASSISTANT

## 2020-02-17 PROCEDURE — 1123F ACP DISCUSS/DSCN MKR DOCD: CPT | Performed by: PHYSICIAN ASSISTANT

## 2020-02-17 PROCEDURE — 99213 OFFICE O/P EST LOW 20 MIN: CPT | Performed by: PHYSICIAN ASSISTANT

## 2020-02-17 PROCEDURE — 4004F PT TOBACCO SCREEN RCVD TLK: CPT | Performed by: PHYSICIAN ASSISTANT

## 2020-02-17 PROCEDURE — G8427 DOCREV CUR MEDS BY ELIG CLIN: HCPCS | Performed by: PHYSICIAN ASSISTANT

## 2020-02-17 PROCEDURE — 4040F PNEUMOC VAC/ADMIN/RCVD: CPT | Performed by: PHYSICIAN ASSISTANT

## 2020-02-17 PROCEDURE — G8420 CALC BMI NORM PARAMETERS: HCPCS | Performed by: PHYSICIAN ASSISTANT

## 2020-02-17 RX ADMIN — IOPAMIDOL 75 ML: 755 INJECTION, SOLUTION INTRAVENOUS at 15:18

## 2020-02-17 ASSESSMENT — ENCOUNTER SYMPTOMS
DIARRHEA: 0
COUGH: 0
SHORTNESS OF BREATH: 0
CONSTIPATION: 0
EYE DISCHARGE: 0
EYE REDNESS: 0
ABDOMINAL PAIN: 0
BACK PAIN: 0
WHEEZING: 0

## 2020-02-17 NOTE — PROGRESS NOTES
tablet by mouth daily 30 tablet 5    carvedilol (COREG) 6.25 MG tablet Take 2 tablets by mouth 2 times daily (with meals) 120 tablet 5    aspirin EC 81 MG EC tablet Take 1 tablet by mouth daily 30 tablet 5    glipiZIDE (GLUCOTROL) 5 MG tablet Take 5 mg by mouth daily       lisinopril (PRINIVIL;ZESTRIL) 10 MG tablet lisinopril 10 mg tablet  DAILY      gabapentin (NEURONTIN) 100 MG capsule Take 1 capsule by mouth 3 times daily for 30 days. 90 capsule 0     No current facility-administered medications for this visit.         Allergies   Allergen Reactions    Phenergan [Promethazine Hcl]        Social History     Socioeconomic History    Marital status:      Spouse name: None    Number of children: None    Years of education: None    Highest education level: None   Occupational History    None   Social Needs    Financial resource strain: None    Food insecurity:     Worry: None     Inability: None    Transportation needs:     Medical: None     Non-medical: None   Tobacco Use    Smoking status: Never Smoker    Smokeless tobacco: Current User     Types: Chew   Substance and Sexual Activity    Alcohol use: Yes     Comment: occ    Drug use: No    Sexual activity: None   Lifestyle    Physical activity:     Days per week: None     Minutes per session: None    Stress: None   Relationships    Social connections:     Talks on phone: None     Gets together: None     Attends Anabaptism service: None     Active member of club or organization: None     Attends meetings of clubs or organizations: None     Relationship status: None    Intimate partner violence:     Fear of current or ex partner: None     Emotionally abused: None     Physically abused: None     Forced sexual activity: None   Other Topics Concern    None   Social History Narrative    None       Family History   Problem Relation Age of Onset    Cancer Mother     Tuberculosis Father        REVIEW OF SYSTEMS:  Review of Systems Follow-up as scheduled for prostate cancer follow-up. No orders of the defined types were placed in this encounter. No orders of the defined types were placed in this encounter. Plan:  Follow-up scheduled for prostate cancer follow-up he will be referred to orthopedic institute for treatment of his lumbosacral pain with radiation down his right leg and sciatica.

## 2020-02-18 NOTE — TELEPHONE ENCOUNTER
If you could contact family member listed in his chart and discuss that with them about him being contacted about referral to orthopedic institute. Explain that attempts him to set up the referral have been unsuccessful and that they may need to contact family members to get this started.

## 2020-03-24 ENCOUNTER — TELEPHONE (OUTPATIENT)
Dept: CARDIOLOGY | Age: 70
End: 2020-03-24

## 2020-03-24 NOTE — TELEPHONE ENCOUNTER
Date of Surgery: TBD    Cardiologist: Dr. Luis Kendall    Procedure: Lumbar epidural steroid injection    Surgeon: Dr. Maritza Carvajal    Last Office Visit: 1-30-20  Reason for office visit and medical concerns addressed at this office visit: CHF, HTN, CAD, DM    Testing Performed and Date of Service:  EKG 6-27-19    Does the patient have a stent? If so, what type? Not within last year    Current Medications: Plavix, lisinopril, neurontin, protonix, lipitor, valium, coreg, ASA, glucotrol, lisinopril     Is the patient currently taking an anticoagulant? If so, what is the diagnosis the patient has been given to warrant the need for the anticoagulant?  Plavix     Additional Notes: Med hold for Plavix x 7 days

## 2020-05-04 ENCOUNTER — TELEPHONE (OUTPATIENT)
Dept: CARDIOLOGY | Age: 70
End: 2020-05-04

## 2020-05-05 RX ORDER — ATORVASTATIN CALCIUM 80 MG/1
80 TABLET, FILM COATED ORAL DAILY
Qty: 90 TABLET | Refills: 0 | Status: SHIPPED | OUTPATIENT
Start: 2020-05-05 | End: 2021-01-01

## 2021-01-01 ENCOUNTER — HOSPITAL ENCOUNTER (OUTPATIENT)
Dept: CARDIAC CATH/INVASIVE PROCEDURES | Age: 71
Setting detail: OBSERVATION
Discharge: NURSING, REHAB OR LONG-TERM CARE | End: 2021-12-31
Attending: INTERNAL MEDICINE | Admitting: INTERNAL MEDICINE
Payer: MEDICARE

## 2021-01-01 ENCOUNTER — OFFICE VISIT (OUTPATIENT)
Dept: CARDIOLOGY CLINIC | Age: 71
End: 2021-01-01
Payer: MEDICARE

## 2021-01-01 ENCOUNTER — APPOINTMENT (OUTPATIENT)
Dept: GENERAL RADIOLOGY | Age: 71
End: 2021-01-01
Attending: INTERNAL MEDICINE
Payer: MEDICARE

## 2021-01-01 ENCOUNTER — APPOINTMENT (OUTPATIENT)
Dept: GENERAL RADIOLOGY | Age: 71
DRG: 291 | End: 2021-01-01
Payer: MEDICARE

## 2021-01-01 ENCOUNTER — TELEPHONE (OUTPATIENT)
Dept: CARDIOLOGY CLINIC | Age: 71
End: 2021-01-01

## 2021-01-01 ENCOUNTER — APPOINTMENT (OUTPATIENT)
Dept: CT IMAGING | Age: 71
DRG: 291 | End: 2021-01-01
Attending: INTERNAL MEDICINE
Payer: MEDICARE

## 2021-01-01 ENCOUNTER — TELEPHONE (OUTPATIENT)
Dept: CARDIOTHORACIC SURGERY | Age: 71
End: 2021-01-01

## 2021-01-01 ENCOUNTER — HOSPITAL ENCOUNTER (INPATIENT)
Dept: CARDIAC CATH/INVASIVE PROCEDURES | Age: 71
LOS: 6 days | Discharge: HOME | DRG: 291 | End: 2021-12-13
Attending: INTERNAL MEDICINE | Admitting: INTERNAL MEDICINE
Payer: MEDICARE

## 2021-01-01 ENCOUNTER — HOSPITAL ENCOUNTER (INPATIENT)
Age: 71
LOS: 4 days | Discharge: HOME | DRG: 291 | End: 2021-11-08
Attending: EMERGENCY MEDICINE | Admitting: INTERNAL MEDICINE
Payer: MEDICARE

## 2021-01-01 VITALS
TEMPERATURE: 97 F | HEART RATE: 79 BPM | OXYGEN SATURATION: 95 % | BODY MASS INDEX: 21.33 KG/M2 | RESPIRATION RATE: 18 BRPM | SYSTOLIC BLOOD PRESSURE: 116 MMHG | DIASTOLIC BLOOD PRESSURE: 96 MMHG | HEIGHT: 70 IN | WEIGHT: 149 LBS

## 2021-01-01 VITALS
BODY MASS INDEX: 20.76 KG/M2 | OXYGEN SATURATION: 99 % | RESPIRATION RATE: 18 BRPM | SYSTOLIC BLOOD PRESSURE: 138 MMHG | HEART RATE: 71 BPM | DIASTOLIC BLOOD PRESSURE: 77 MMHG | WEIGHT: 145 LBS | HEIGHT: 70 IN | TEMPERATURE: 97.1 F

## 2021-01-01 VITALS
HEART RATE: 87 BPM | SYSTOLIC BLOOD PRESSURE: 120 MMHG | HEIGHT: 70 IN | BODY MASS INDEX: 21.19 KG/M2 | WEIGHT: 148 LBS | DIASTOLIC BLOOD PRESSURE: 76 MMHG

## 2021-01-01 VITALS
WEIGHT: 148.44 LBS | BODY MASS INDEX: 21.25 KG/M2 | TEMPERATURE: 97.3 F | RESPIRATION RATE: 16 BRPM | HEART RATE: 58 BPM | OXYGEN SATURATION: 96 % | HEIGHT: 70 IN | SYSTOLIC BLOOD PRESSURE: 117 MMHG | DIASTOLIC BLOOD PRESSURE: 71 MMHG

## 2021-01-01 DIAGNOSIS — Z79.01 CHRONIC ANTICOAGULATION: ICD-10-CM

## 2021-01-01 DIAGNOSIS — Z95.0 PACEMAKER: Primary | ICD-10-CM

## 2021-01-01 DIAGNOSIS — I10 ESSENTIAL HYPERTENSION: ICD-10-CM

## 2021-01-01 DIAGNOSIS — I50.20 SYSTOLIC CONGESTIVE HEART FAILURE, UNSPECIFIED HF CHRONICITY (HCC): ICD-10-CM

## 2021-01-01 DIAGNOSIS — C61 MALIGNANT NEOPLASM OF PROSTATE (HCC): ICD-10-CM

## 2021-01-01 DIAGNOSIS — I25.5 ISCHEMIC CARDIOMYOPATHY: ICD-10-CM

## 2021-01-01 DIAGNOSIS — I48.0 PAROXYSMAL ATRIAL FIBRILLATION (HCC): Primary | ICD-10-CM

## 2021-01-01 DIAGNOSIS — R06.09 DOE (DYSPNEA ON EXERTION): ICD-10-CM

## 2021-01-01 DIAGNOSIS — I25.10 CORONARY ARTERY DISEASE INVOLVING NATIVE CORONARY ARTERY OF NATIVE HEART WITHOUT ANGINA PECTORIS: ICD-10-CM

## 2021-01-01 DIAGNOSIS — I50.21 ACUTE SYSTOLIC CONGESTIVE HEART FAILURE (HCC): Primary | ICD-10-CM

## 2021-01-01 LAB
ALBUMIN SERPL-MCNC: 3.1 G/DL (ref 3.5–5.2)
ALBUMIN SERPL-MCNC: 3.2 G/DL (ref 3.5–5.2)
ALBUMIN SERPL-MCNC: 3.2 G/DL (ref 3.5–5.2)
ALBUMIN SERPL-MCNC: 3.3 G/DL (ref 3.5–5.2)
ALBUMIN SERPL-MCNC: 3.6 G/DL (ref 3.5–5.2)
ALBUMIN SERPL-MCNC: 4.2 G/DL (ref 3.5–5.2)
ALP BLD-CCNC: 61 U/L (ref 40–130)
ALP BLD-CCNC: 63 U/L (ref 40–130)
ALP BLD-CCNC: 63 U/L (ref 40–130)
ALP BLD-CCNC: 68 U/L (ref 40–130)
ALP BLD-CCNC: 75 U/L (ref 40–130)
ALP BLD-CCNC: 82 U/L (ref 40–130)
ALT SERPL-CCNC: 13 U/L (ref 5–41)
ALT SERPL-CCNC: 14 U/L (ref 5–41)
ALT SERPL-CCNC: 15 U/L (ref 5–41)
ALT SERPL-CCNC: 16 U/L (ref 5–41)
ALT SERPL-CCNC: 18 U/L (ref 5–41)
ALT SERPL-CCNC: 31 U/L (ref 5–41)
ANION GAP SERPL CALCULATED.3IONS-SCNC: 10 MMOL/L (ref 7–19)
ANION GAP SERPL CALCULATED.3IONS-SCNC: 11 MMOL/L (ref 7–19)
ANION GAP SERPL CALCULATED.3IONS-SCNC: 12 MMOL/L (ref 7–19)
ANION GAP SERPL CALCULATED.3IONS-SCNC: 13 MMOL/L (ref 7–19)
ANION GAP SERPL CALCULATED.3IONS-SCNC: 14 MMOL/L (ref 7–19)
ANION GAP SERPL CALCULATED.3IONS-SCNC: 8 MMOL/L (ref 7–19)
ANION GAP SERPL CALCULATED.3IONS-SCNC: 8 MMOL/L (ref 7–19)
ANION GAP SERPL CALCULATED.3IONS-SCNC: 9 MMOL/L (ref 7–19)
AST SERPL-CCNC: 11 U/L (ref 5–40)
AST SERPL-CCNC: 12 U/L (ref 5–40)
AST SERPL-CCNC: 13 U/L (ref 5–40)
AST SERPL-CCNC: 15 U/L (ref 5–40)
AST SERPL-CCNC: 19 U/L (ref 5–40)
AST SERPL-CCNC: 20 U/L (ref 5–40)
BASE EXCESS ARTERIAL: 0 MMOL/L (ref -2–2)
BASOPHILS ABSOLUTE: 0 K/UL (ref 0–0.2)
BASOPHILS ABSOLUTE: 0 K/UL (ref 0–0.2)
BASOPHILS ABSOLUTE: 0.1 K/UL (ref 0–0.2)
BASOPHILS RELATIVE PERCENT: 0.7 % (ref 0–1)
BASOPHILS RELATIVE PERCENT: 0.9 % (ref 0–1)
BASOPHILS RELATIVE PERCENT: 1.1 % (ref 0–1)
BASOPHILS RELATIVE PERCENT: 1.2 % (ref 0–1)
BASOPHILS RELATIVE PERCENT: 1.3 % (ref 0–1)
BILIRUB SERPL-MCNC: 0.5 MG/DL (ref 0.2–1.2)
BILIRUB SERPL-MCNC: 0.5 MG/DL (ref 0.2–1.2)
BILIRUB SERPL-MCNC: 0.6 MG/DL (ref 0.2–1.2)
BILIRUB SERPL-MCNC: 0.7 MG/DL (ref 0.2–1.2)
BILIRUB SERPL-MCNC: 0.7 MG/DL (ref 0.2–1.2)
BILIRUB SERPL-MCNC: 0.8 MG/DL (ref 0.2–1.2)
BLOOD CULTURE, ROUTINE: NORMAL
BUN BLDV-MCNC: 10 MG/DL (ref 8–23)
BUN BLDV-MCNC: 12 MG/DL (ref 8–23)
BUN BLDV-MCNC: 12 MG/DL (ref 8–23)
BUN BLDV-MCNC: 13 MG/DL (ref 8–23)
BUN BLDV-MCNC: 22 MG/DL (ref 8–23)
BUN BLDV-MCNC: 23 MG/DL (ref 8–23)
BUN BLDV-MCNC: 8 MG/DL (ref 8–23)
BUN BLDV-MCNC: 8 MG/DL (ref 8–23)
BUN BLDV-MCNC: 9 MG/DL (ref 8–23)
CALCIUM SERPL-MCNC: 8.3 MG/DL (ref 8.8–10.2)
CALCIUM SERPL-MCNC: 8.3 MG/DL (ref 8.8–10.2)
CALCIUM SERPL-MCNC: 8.4 MG/DL (ref 8.8–10.2)
CALCIUM SERPL-MCNC: 8.4 MG/DL (ref 8.8–10.2)
CALCIUM SERPL-MCNC: 8.5 MG/DL (ref 8.8–10.2)
CALCIUM SERPL-MCNC: 8.7 MG/DL (ref 8.8–10.2)
CALCIUM SERPL-MCNC: 8.7 MG/DL (ref 8.8–10.2)
CALCIUM SERPL-MCNC: 8.8 MG/DL (ref 8.8–10.2)
CALCIUM SERPL-MCNC: 8.8 MG/DL (ref 8.8–10.2)
CALCIUM SERPL-MCNC: 9 MG/DL (ref 8.8–10.2)
CALCIUM SERPL-MCNC: 9 MG/DL (ref 8.8–10.2)
CALCIUM SERPL-MCNC: 9.3 MG/DL (ref 8.8–10.2)
CALCIUM SERPL-MCNC: 9.8 MG/DL (ref 8.8–10.2)
CARBOXYHEMOGLOBIN ARTERIAL: 1.7 % (ref 0–5)
CHLORIDE BLD-SCNC: 100 MMOL/L (ref 98–111)
CHLORIDE BLD-SCNC: 100 MMOL/L (ref 98–111)
CHLORIDE BLD-SCNC: 101 MMOL/L (ref 98–111)
CHLORIDE BLD-SCNC: 102 MMOL/L (ref 98–111)
CHLORIDE BLD-SCNC: 103 MMOL/L (ref 98–111)
CHLORIDE BLD-SCNC: 104 MMOL/L (ref 98–111)
CHLORIDE BLD-SCNC: 105 MMOL/L (ref 98–111)
CHLORIDE BLD-SCNC: 106 MMOL/L (ref 98–111)
CO2: 22 MMOL/L (ref 22–29)
CO2: 23 MMOL/L (ref 22–29)
CO2: 23 MMOL/L (ref 22–29)
CO2: 24 MMOL/L (ref 22–29)
CO2: 25 MMOL/L (ref 22–29)
CO2: 26 MMOL/L (ref 22–29)
CO2: 26 MMOL/L (ref 22–29)
CO2: 27 MMOL/L (ref 22–29)
CO2: 27 MMOL/L (ref 22–29)
CO2: 28 MMOL/L (ref 22–29)
CREAT SERPL-MCNC: 0.4 MG/DL (ref 0.5–1.2)
CREAT SERPL-MCNC: 0.6 MG/DL (ref 0.5–1.2)
CREAT SERPL-MCNC: 0.7 MG/DL (ref 0.5–1.2)
CREAT SERPL-MCNC: 0.8 MG/DL (ref 0.5–1.2)
CREAT SERPL-MCNC: 1.1 MG/DL (ref 0.5–1.2)
CREAT SERPL-MCNC: 1.2 MG/DL (ref 0.5–1.2)
CULTURE, BLOOD 2: NORMAL
D DIMER: 1.14 UG/ML FEU (ref 0–0.48)
DIGOXIN LEVEL: 0.8 NG/ML (ref 0.6–1.2)
EKG P AXIS: -12 DEGREES
EKG P AXIS: -34 DEGREES
EKG P AXIS: -4 DEGREES
EKG P AXIS: -6 DEGREES
EKG P AXIS: -9 DEGREES
EKG P AXIS: 28 DEGREES
EKG P AXIS: 30 DEGREES
EKG P AXIS: 37 DEGREES
EKG P AXIS: 43 DEGREES
EKG P AXIS: 48 DEGREES
EKG P AXIS: NORMAL DEGREES
EKG P-R INTERVAL: 126 MS
EKG P-R INTERVAL: 154 MS
EKG P-R INTERVAL: 158 MS
EKG P-R INTERVAL: 172 MS
EKG P-R INTERVAL: 174 MS
EKG P-R INTERVAL: 176 MS
EKG P-R INTERVAL: 198 MS
EKG P-R INTERVAL: 198 MS
EKG P-R INTERVAL: 218 MS
EKG P-R INTERVAL: NORMAL MS
EKG P-R INTERVAL: NORMAL MS
EKG Q-T INTERVAL: 356 MS
EKG Q-T INTERVAL: 360 MS
EKG Q-T INTERVAL: 366 MS
EKG Q-T INTERVAL: 386 MS
EKG Q-T INTERVAL: 402 MS
EKG Q-T INTERVAL: 416 MS
EKG Q-T INTERVAL: 428 MS
EKG Q-T INTERVAL: 432 MS
EKG Q-T INTERVAL: 432 MS
EKG Q-T INTERVAL: 434 MS
EKG Q-T INTERVAL: 444 MS
EKG Q-T INTERVAL: 466 MS
EKG Q-T INTERVAL: 490 MS
EKG QRS DURATION: 100 MS
EKG QRS DURATION: 104 MS
EKG QRS DURATION: 108 MS
EKG QRS DURATION: 82 MS
EKG QRS DURATION: 84 MS
EKG QRS DURATION: 88 MS
EKG QRS DURATION: 90 MS
EKG QRS DURATION: 92 MS
EKG QRS DURATION: 94 MS
EKG QRS DURATION: 94 MS
EKG QRS DURATION: 96 MS
EKG QTC CALCULATION (BAZETT): 429 MS
EKG QTC CALCULATION (BAZETT): 431 MS
EKG QTC CALCULATION (BAZETT): 438 MS
EKG QTC CALCULATION (BAZETT): 442 MS
EKG QTC CALCULATION (BAZETT): 449 MS
EKG QTC CALCULATION (BAZETT): 453 MS
EKG QTC CALCULATION (BAZETT): 456 MS
EKG QTC CALCULATION (BAZETT): 459 MS
EKG QTC CALCULATION (BAZETT): 461 MS
EKG QTC CALCULATION (BAZETT): 463 MS
EKG QTC CALCULATION (BAZETT): 465 MS
EKG QTC CALCULATION (BAZETT): 473 MS
EKG QTC CALCULATION (BAZETT): 490 MS
EKG T AXIS: 102 DEGREES
EKG T AXIS: 103 DEGREES
EKG T AXIS: 108 DEGREES
EKG T AXIS: 116 DEGREES
EKG T AXIS: 75 DEGREES
EKG T AXIS: 76 DEGREES
EKG T AXIS: 78 DEGREES
EKG T AXIS: 80 DEGREES
EKG T AXIS: 85 DEGREES
EKG T AXIS: 90 DEGREES
EKG T AXIS: 95 DEGREES
EKG T AXIS: 96 DEGREES
EKG T AXIS: NORMAL DEGREES
EOSINOPHILS ABSOLUTE: 0.1 K/UL (ref 0–0.6)
EOSINOPHILS ABSOLUTE: 0.2 K/UL (ref 0–0.6)
EOSINOPHILS ABSOLUTE: 0.2 K/UL (ref 0–0.6)
EOSINOPHILS RELATIVE PERCENT: 1.9 % (ref 0–5)
EOSINOPHILS RELATIVE PERCENT: 2 % (ref 0–5)
EOSINOPHILS RELATIVE PERCENT: 2 % (ref 0–5)
EOSINOPHILS RELATIVE PERCENT: 2.8 % (ref 0–5)
EOSINOPHILS RELATIVE PERCENT: 3.3 % (ref 0–5)
EOSINOPHILS RELATIVE PERCENT: 4 % (ref 0–5)
EOSINOPHILS RELATIVE PERCENT: 4 % (ref 0–5)
GFR AFRICAN AMERICAN: >59
GFR NON-AFRICAN AMERICAN: 59
GFR NON-AFRICAN AMERICAN: >60
GLUCOSE BLD-MCNC: 105 MG/DL (ref 74–109)
GLUCOSE BLD-MCNC: 106 MG/DL (ref 74–109)
GLUCOSE BLD-MCNC: 114 MG/DL (ref 70–99)
GLUCOSE BLD-MCNC: 118 MG/DL (ref 70–99)
GLUCOSE BLD-MCNC: 118 MG/DL (ref 70–99)
GLUCOSE BLD-MCNC: 118 MG/DL (ref 74–109)
GLUCOSE BLD-MCNC: 118 MG/DL (ref 74–109)
GLUCOSE BLD-MCNC: 123 MG/DL (ref 70–99)
GLUCOSE BLD-MCNC: 128 MG/DL (ref 74–109)
GLUCOSE BLD-MCNC: 129 MG/DL (ref 70–99)
GLUCOSE BLD-MCNC: 142 MG/DL (ref 70–99)
GLUCOSE BLD-MCNC: 143 MG/DL (ref 70–99)
GLUCOSE BLD-MCNC: 146 MG/DL (ref 70–99)
GLUCOSE BLD-MCNC: 146 MG/DL (ref 74–109)
GLUCOSE BLD-MCNC: 149 MG/DL (ref 74–109)
GLUCOSE BLD-MCNC: 151 MG/DL (ref 70–99)
GLUCOSE BLD-MCNC: 151 MG/DL (ref 74–109)
GLUCOSE BLD-MCNC: 155 MG/DL (ref 74–109)
GLUCOSE BLD-MCNC: 160 MG/DL (ref 70–99)
GLUCOSE BLD-MCNC: 162 MG/DL (ref 70–99)
GLUCOSE BLD-MCNC: 165 MG/DL (ref 74–109)
GLUCOSE BLD-MCNC: 166 MG/DL (ref 70–99)
GLUCOSE BLD-MCNC: 167 MG/DL (ref 74–109)
GLUCOSE BLD-MCNC: 169 MG/DL (ref 70–99)
GLUCOSE BLD-MCNC: 170 MG/DL (ref 70–99)
GLUCOSE BLD-MCNC: 179 MG/DL (ref 70–99)
GLUCOSE BLD-MCNC: 180 MG/DL (ref 70–99)
GLUCOSE BLD-MCNC: 180 MG/DL (ref 70–99)
GLUCOSE BLD-MCNC: 185 MG/DL (ref 74–109)
GLUCOSE BLD-MCNC: 187 MG/DL (ref 70–99)
GLUCOSE BLD-MCNC: 188 MG/DL (ref 70–99)
GLUCOSE BLD-MCNC: 193 MG/DL (ref 70–99)
GLUCOSE BLD-MCNC: 201 MG/DL (ref 74–109)
GLUCOSE BLD-MCNC: 205 MG/DL (ref 70–99)
GLUCOSE BLD-MCNC: 252 MG/DL (ref 70–99)
GLUCOSE BLD-MCNC: 98 MG/DL (ref 70–99)
HBA1C MFR BLD: 6.5 % (ref 4–6)
HCO3 ARTERIAL: 23.8 MMOL/L (ref 22–26)
HCT VFR BLD CALC: 32.3 % (ref 42–52)
HCT VFR BLD CALC: 33.3 % (ref 42–52)
HCT VFR BLD CALC: 34 % (ref 42–52)
HCT VFR BLD CALC: 34.2 % (ref 42–52)
HCT VFR BLD CALC: 34.7 % (ref 42–52)
HCT VFR BLD CALC: 35.5 % (ref 42–52)
HCT VFR BLD CALC: 35.6 % (ref 42–52)
HCT VFR BLD CALC: 37.8 % (ref 42–52)
HCT VFR BLD CALC: 40.3 % (ref 42–52)
HCT VFR BLD CALC: 40.3 % (ref 42–52)
HCT VFR BLD CALC: 41.2 % (ref 42–52)
HEMOGLOBIN, ART, EXTENDED: 12.7 G/DL (ref 14–18)
HEMOGLOBIN: 10.7 G/DL (ref 14–18)
HEMOGLOBIN: 11.2 G/DL (ref 14–18)
HEMOGLOBIN: 11.5 G/DL (ref 14–18)
HEMOGLOBIN: 11.6 G/DL (ref 14–18)
HEMOGLOBIN: 11.8 G/DL (ref 14–18)
HEMOGLOBIN: 11.9 G/DL (ref 14–18)
HEMOGLOBIN: 12.1 G/DL (ref 14–18)
HEMOGLOBIN: 12.2 G/DL (ref 14–18)
HEMOGLOBIN: 13.4 G/DL (ref 14–18)
HEMOGLOBIN: 13.7 G/DL (ref 14–18)
HEMOGLOBIN: 14.4 G/DL (ref 14–18)
IMMATURE GRANULOCYTES #: 0 K/UL
LACTIC ACID: 1.2 MMOL/L (ref 0.5–1.9)
LV EF: 25 %
LVEF MODALITY: NORMAL
LYMPHOCYTES ABSOLUTE: 0.9 K/UL (ref 1.1–4.5)
LYMPHOCYTES ABSOLUTE: 1 K/UL (ref 1.1–4.5)
LYMPHOCYTES ABSOLUTE: 1.1 K/UL (ref 1.1–4.5)
LYMPHOCYTES ABSOLUTE: 1.2 K/UL (ref 1.1–4.5)
LYMPHOCYTES ABSOLUTE: 1.3 K/UL (ref 1.1–4.5)
LYMPHOCYTES RELATIVE PERCENT: 17.8 % (ref 20–40)
LYMPHOCYTES RELATIVE PERCENT: 20.9 % (ref 20–40)
LYMPHOCYTES RELATIVE PERCENT: 21 % (ref 20–40)
LYMPHOCYTES RELATIVE PERCENT: 23.3 % (ref 20–40)
LYMPHOCYTES RELATIVE PERCENT: 28.5 % (ref 20–40)
LYMPHOCYTES RELATIVE PERCENT: 29.8 % (ref 20–40)
LYMPHOCYTES RELATIVE PERCENT: 31.1 % (ref 20–40)
MAGNESIUM: 1.8 MG/DL (ref 1.6–2.4)
MAGNESIUM: 1.8 MG/DL (ref 1.6–2.4)
MAGNESIUM: 1.9 MG/DL (ref 1.6–2.4)
MAGNESIUM: 2 MG/DL (ref 1.6–2.4)
MCH RBC QN AUTO: 30.6 PG (ref 27–31)
MCH RBC QN AUTO: 30.7 PG (ref 27–31)
MCH RBC QN AUTO: 30.9 PG (ref 27–31)
MCH RBC QN AUTO: 31 PG (ref 27–31)
MCH RBC QN AUTO: 31 PG (ref 27–31)
MCH RBC QN AUTO: 31.1 PG (ref 27–31)
MCH RBC QN AUTO: 31.5 PG (ref 27–31)
MCH RBC QN AUTO: 31.5 PG (ref 27–31)
MCH RBC QN AUTO: 31.6 PG (ref 27–31)
MCH RBC QN AUTO: 31.7 PG (ref 27–31)
MCH RBC QN AUTO: 31.8 PG (ref 27–31)
MCHC RBC AUTO-ENTMCNC: 32.3 G/DL (ref 33–37)
MCHC RBC AUTO-ENTMCNC: 33.1 G/DL (ref 33–37)
MCHC RBC AUTO-ENTMCNC: 33.1 G/DL (ref 33–37)
MCHC RBC AUTO-ENTMCNC: 33.3 G/DL (ref 33–37)
MCHC RBC AUTO-ENTMCNC: 33.4 G/DL (ref 33–37)
MCHC RBC AUTO-ENTMCNC: 33.6 G/DL (ref 33–37)
MCHC RBC AUTO-ENTMCNC: 33.8 G/DL (ref 33–37)
MCHC RBC AUTO-ENTMCNC: 34 G/DL (ref 33–37)
MCHC RBC AUTO-ENTMCNC: 34.1 G/DL (ref 33–37)
MCHC RBC AUTO-ENTMCNC: 34.8 G/DL (ref 33–37)
MCHC RBC AUTO-ENTMCNC: 35 G/DL (ref 33–37)
MCV RBC AUTO: 90.5 FL (ref 80–94)
MCV RBC AUTO: 90.8 FL (ref 80–94)
MCV RBC AUTO: 91.4 FL (ref 80–94)
MCV RBC AUTO: 92.2 FL (ref 80–94)
MCV RBC AUTO: 92.6 FL (ref 80–94)
MCV RBC AUTO: 92.6 FL (ref 80–94)
MCV RBC AUTO: 93.2 FL (ref 80–94)
MCV RBC AUTO: 93.3 FL (ref 80–94)
MCV RBC AUTO: 93.9 FL (ref 80–94)
MCV RBC AUTO: 94.7 FL (ref 80–94)
MCV RBC AUTO: 94.8 FL (ref 80–94)
METHEMOGLOBIN ARTERIAL: 0.7 %
MONOCYTES ABSOLUTE: 0.3 K/UL (ref 0–0.9)
MONOCYTES ABSOLUTE: 0.4 K/UL (ref 0–0.9)
MONOCYTES ABSOLUTE: 0.6 K/UL (ref 0–0.9)
MONOCYTES RELATIVE PERCENT: 10.8 % (ref 0–10)
MONOCYTES RELATIVE PERCENT: 11.2 % (ref 0–10)
MONOCYTES RELATIVE PERCENT: 6.9 % (ref 0–10)
MONOCYTES RELATIVE PERCENT: 7.2 % (ref 0–10)
MONOCYTES RELATIVE PERCENT: 7.5 % (ref 0–10)
MONOCYTES RELATIVE PERCENT: 9.6 % (ref 0–10)
MONOCYTES RELATIVE PERCENT: 9.8 % (ref 0–10)
NEUTROPHILS ABSOLUTE: 2 K/UL (ref 1.5–7.5)
NEUTROPHILS ABSOLUTE: 2 K/UL (ref 1.5–7.5)
NEUTROPHILS ABSOLUTE: 2.4 K/UL (ref 1.5–7.5)
NEUTROPHILS ABSOLUTE: 3.2 K/UL (ref 1.5–7.5)
NEUTROPHILS ABSOLUTE: 3.5 K/UL (ref 1.5–7.5)
NEUTROPHILS ABSOLUTE: 3.6 K/UL (ref 1.5–7.5)
NEUTROPHILS ABSOLUTE: 3.7 K/UL (ref 1.5–7.5)
NEUTROPHILS RELATIVE PERCENT: 53.8 % (ref 50–65)
NEUTROPHILS RELATIVE PERCENT: 54.3 % (ref 50–65)
NEUTROPHILS RELATIVE PERCENT: 58.7 % (ref 50–65)
NEUTROPHILS RELATIVE PERCENT: 63 % (ref 50–65)
NEUTROPHILS RELATIVE PERCENT: 66.2 % (ref 50–65)
NEUTROPHILS RELATIVE PERCENT: 68.4 % (ref 50–65)
NEUTROPHILS RELATIVE PERCENT: 71.5 % (ref 50–65)
O2 CONTENT ARTERIAL: 16.2 ML/DL
O2 SAT, ARTERIAL: 90.8 %
O2 THERAPY: ABNORMAL
PCO2 ARTERIAL: 35 MMHG (ref 35–45)
PDW BLD-RTO: 12.6 % (ref 11.5–14.5)
PDW BLD-RTO: 12.6 % (ref 11.5–14.5)
PDW BLD-RTO: 13 % (ref 11.5–14.5)
PDW BLD-RTO: 13 % (ref 11.5–14.5)
PDW BLD-RTO: 13.2 % (ref 11.5–14.5)
PDW BLD-RTO: 13.4 % (ref 11.5–14.5)
PDW BLD-RTO: 13.4 % (ref 11.5–14.5)
PDW BLD-RTO: 13.5 % (ref 11.5–14.5)
PDW BLD-RTO: 13.6 % (ref 11.5–14.5)
PDW BLD-RTO: 13.6 % (ref 11.5–14.5)
PDW BLD-RTO: 13.7 % (ref 11.5–14.5)
PERFORMED ON: ABNORMAL
PERFORMED ON: NORMAL
PH ARTERIAL: 7.44 (ref 7.35–7.45)
PLATELET # BLD: 133 K/UL (ref 130–400)
PLATELET # BLD: 138 K/UL (ref 130–400)
PLATELET # BLD: 144 K/UL (ref 130–400)
PLATELET # BLD: 147 K/UL (ref 130–400)
PLATELET # BLD: 149 K/UL (ref 130–400)
PLATELET # BLD: 159 K/UL (ref 130–400)
PLATELET # BLD: 160 K/UL (ref 130–400)
PLATELET # BLD: 164 K/UL (ref 130–400)
PLATELET # BLD: 168 K/UL (ref 130–400)
PLATELET # BLD: 184 K/UL (ref 130–400)
PLATELET # BLD: 194 K/UL (ref 130–400)
PMV BLD AUTO: 10 FL (ref 9.4–12.4)
PMV BLD AUTO: 10.1 FL (ref 9.4–12.4)
PMV BLD AUTO: 10.1 FL (ref 9.4–12.4)
PMV BLD AUTO: 10.2 FL (ref 9.4–12.4)
PMV BLD AUTO: 10.3 FL (ref 9.4–12.4)
PMV BLD AUTO: 10.6 FL (ref 9.4–12.4)
PMV BLD AUTO: 9.4 FL (ref 9.4–12.4)
PMV BLD AUTO: 9.8 FL (ref 9.4–12.4)
PO2 ARTERIAL: 57 MMHG (ref 80–100)
POTASSIUM REFLEX MAGNESIUM: 3.1 MMOL/L (ref 3.5–5)
POTASSIUM REFLEX MAGNESIUM: 3.5 MMOL/L (ref 3.5–5)
POTASSIUM REFLEX MAGNESIUM: 3.6 MMOL/L (ref 3.5–5)
POTASSIUM REFLEX MAGNESIUM: 3.6 MMOL/L (ref 3.5–5)
POTASSIUM REFLEX MAGNESIUM: 4 MMOL/L (ref 3.5–5)
POTASSIUM REFLEX MAGNESIUM: 4.5 MMOL/L (ref 3.5–5)
POTASSIUM REFLEX MAGNESIUM: 5.1 MMOL/L (ref 3.5–5)
POTASSIUM SERPL-SCNC: 3.2 MMOL/L (ref 3.5–5)
POTASSIUM SERPL-SCNC: 3.5 MMOL/L (ref 3.5–5)
POTASSIUM SERPL-SCNC: 3.5 MMOL/L (ref 3.5–5)
POTASSIUM SERPL-SCNC: 3.6 MMOL/L (ref 3.5–5)
POTASSIUM SERPL-SCNC: 3.8 MMOL/L (ref 3.5–5)
POTASSIUM SERPL-SCNC: 5.1 MMOL/L (ref 3.5–5)
POTASSIUM, WHOLE BLOOD: 3.9
PRO-BNP: 2701 PG/ML (ref 0–900)
PRO-BNP: 2777 PG/ML (ref 0–900)
PRO-BNP: 4066 PG/ML (ref 0–900)
PRO-BNP: 4719 PG/ML (ref 0–900)
PROSTATE SPECIFIC ANTIGEN: 0.19 NG/ML (ref 0–4)
RBC # BLD: 3.49 M/UL (ref 4.7–6.1)
RBC # BLD: 3.61 M/UL (ref 4.7–6.1)
RBC # BLD: 3.65 M/UL (ref 4.7–6.1)
RBC # BLD: 3.66 M/UL (ref 4.7–6.1)
RBC # BLD: 3.74 M/UL (ref 4.7–6.1)
RBC # BLD: 3.79 M/UL (ref 4.7–6.1)
RBC # BLD: 3.91 M/UL (ref 4.7–6.1)
RBC # BLD: 3.99 M/UL (ref 4.7–6.1)
RBC # BLD: 4.32 M/UL (ref 4.7–6.1)
RBC # BLD: 4.35 M/UL (ref 4.7–6.1)
RBC # BLD: 4.55 M/UL (ref 4.7–6.1)
SARS-COV-2, NAAT: NOT DETECTED
SARS-COV-2, NAAT: NOT DETECTED
SODIUM BLD-SCNC: 135 MMOL/L (ref 136–145)
SODIUM BLD-SCNC: 136 MMOL/L (ref 136–145)
SODIUM BLD-SCNC: 136 MMOL/L (ref 136–145)
SODIUM BLD-SCNC: 137 MMOL/L (ref 136–145)
SODIUM BLD-SCNC: 138 MMOL/L (ref 136–145)
SODIUM BLD-SCNC: 138 MMOL/L (ref 136–145)
SODIUM BLD-SCNC: 139 MMOL/L (ref 136–145)
SODIUM BLD-SCNC: 140 MMOL/L (ref 136–145)
SODIUM BLD-SCNC: 140 MMOL/L (ref 136–145)
TOTAL PROTEIN: 4.7 G/DL (ref 6.6–8.7)
TOTAL PROTEIN: 5 G/DL (ref 6.6–8.7)
TOTAL PROTEIN: 5.1 G/DL (ref 6.6–8.7)
TOTAL PROTEIN: 5.3 G/DL (ref 6.6–8.7)
TOTAL PROTEIN: 6.4 G/DL (ref 6.6–8.7)
TOTAL PROTEIN: 6.8 G/DL (ref 6.6–8.7)
TROPONIN: 0.02 NG/ML (ref 0–0.03)
TROPONIN: 0.03 NG/ML (ref 0–0.03)
TROPONIN: 0.03 NG/ML (ref 0–0.03)
TSH REFLEX FT4: 3.79 UIU/ML (ref 0.35–5.5)
WBC # BLD: 3.7 K/UL (ref 4.8–10.8)
WBC # BLD: 3.8 K/UL (ref 4.8–10.8)
WBC # BLD: 4.1 K/UL (ref 4.8–10.8)
WBC # BLD: 4.6 K/UL (ref 4.8–10.8)
WBC # BLD: 4.6 K/UL (ref 4.8–10.8)
WBC # BLD: 5.2 K/UL (ref 4.8–10.8)
WBC # BLD: 5.3 K/UL (ref 4.8–10.8)
WBC # BLD: 5.4 K/UL (ref 4.8–10.8)
WBC # BLD: 5.4 K/UL (ref 4.8–10.8)
WBC # BLD: 5.5 K/UL (ref 4.8–10.8)
WBC # BLD: 6.9 K/UL (ref 4.8–10.8)

## 2021-01-01 PROCEDURE — 83880 ASSAY OF NATRIURETIC PEPTIDE: CPT

## 2021-01-01 PROCEDURE — 99232 SBSQ HOSP IP/OBS MODERATE 35: CPT | Performed by: INTERNAL MEDICINE

## 2021-01-01 PROCEDURE — 36415 COLL VENOUS BLD VENIPUNCTURE: CPT

## 2021-01-01 PROCEDURE — C1895 LEAD, AICD, ENDO DUAL COIL: HCPCS

## 2021-01-01 PROCEDURE — 2700000000 HC OXYGEN THERAPY PER DAY

## 2021-01-01 PROCEDURE — 99231 SBSQ HOSP IP/OBS SF/LOW 25: CPT | Performed by: INTERNAL MEDICINE

## 2021-01-01 PROCEDURE — G8420 CALC BMI NORM PARAMETERS: HCPCS | Performed by: INTERNAL MEDICINE

## 2021-01-01 PROCEDURE — 6370000000 HC RX 637 (ALT 250 FOR IP): Performed by: INTERNAL MEDICINE

## 2021-01-01 PROCEDURE — G8427 DOCREV CUR MEDS BY ELIG CLIN: HCPCS | Performed by: INTERNAL MEDICINE

## 2021-01-01 PROCEDURE — 84484 ASSAY OF TROPONIN QUANT: CPT

## 2021-01-01 PROCEDURE — 94761 N-INVAS EAR/PLS OXIMETRY MLT: CPT

## 2021-01-01 PROCEDURE — 93971 EXTREMITY STUDY: CPT

## 2021-01-01 PROCEDURE — 6360000002 HC RX W HCPCS: Performed by: INTERNAL MEDICINE

## 2021-01-01 PROCEDURE — 2580000003 HC RX 258: Performed by: INTERNAL MEDICINE

## 2021-01-01 PROCEDURE — 99223 1ST HOSP IP/OBS HIGH 75: CPT | Performed by: INTERNAL MEDICINE

## 2021-01-01 PROCEDURE — 99153 MOD SED SAME PHYS/QHP EA: CPT

## 2021-01-01 PROCEDURE — 99238 HOSP IP/OBS DSCHRG MGMT 30/<: CPT | Performed by: INTERNAL MEDICINE

## 2021-01-01 PROCEDURE — 93970 EXTREMITY STUDY: CPT

## 2021-01-01 PROCEDURE — 80048 BASIC METABOLIC PNL TOTAL CA: CPT

## 2021-01-01 PROCEDURE — 93010 ELECTROCARDIOGRAM REPORT: CPT | Performed by: INTERNAL MEDICINE

## 2021-01-01 PROCEDURE — 6360000004 HC RX CONTRAST MEDICATION: Performed by: INTERNAL MEDICINE

## 2021-01-01 PROCEDURE — 93005 ELECTROCARDIOGRAM TRACING: CPT | Performed by: INTERNAL MEDICINE

## 2021-01-01 PROCEDURE — 33249 INSJ/RPLCMT DEFIB W/LEAD(S): CPT

## 2021-01-01 PROCEDURE — 82947 ASSAY GLUCOSE BLOOD QUANT: CPT

## 2021-01-01 PROCEDURE — 99214 OFFICE O/P EST MOD 30 MIN: CPT | Performed by: INTERNAL MEDICINE

## 2021-01-01 PROCEDURE — 85025 COMPLETE CBC W/AUTO DIFF WBC: CPT

## 2021-01-01 PROCEDURE — 2780000010 HC IMPLANT OTHER

## 2021-01-01 PROCEDURE — 4040F PNEUMOC VAC/ADMIN/RCVD: CPT | Performed by: INTERNAL MEDICINE

## 2021-01-01 PROCEDURE — 82803 BLOOD GASES ANY COMBINATION: CPT

## 2021-01-01 PROCEDURE — 96374 THER/PROPH/DIAG INJ IV PUSH: CPT

## 2021-01-01 PROCEDURE — G0378 HOSPITAL OBSERVATION PER HR: HCPCS

## 2021-01-01 PROCEDURE — 2140000000 HC CCU INTERMEDIATE R&B

## 2021-01-01 PROCEDURE — 6370000000 HC RX 637 (ALT 250 FOR IP): Performed by: NURSE PRACTITIONER

## 2021-01-01 PROCEDURE — 83735 ASSAY OF MAGNESIUM: CPT

## 2021-01-01 PROCEDURE — 93005 ELECTROCARDIOGRAM TRACING: CPT | Performed by: NURSE PRACTITIONER

## 2021-01-01 PROCEDURE — 85027 COMPLETE CBC AUTOMATED: CPT

## 2021-01-01 PROCEDURE — C1898 LEAD, PMKR, OTHER THAN TRANS: HCPCS

## 2021-01-01 PROCEDURE — 99285 EMERGENCY DEPT VISIT HI MDM: CPT

## 2021-01-01 PROCEDURE — 6360000002 HC RX W HCPCS: Performed by: STUDENT IN AN ORGANIZED HEALTH CARE EDUCATION/TRAINING PROGRAM

## 2021-01-01 PROCEDURE — 87635 SARS-COV-2 COVID-19 AMP PRB: CPT

## 2021-01-01 PROCEDURE — 97530 THERAPEUTIC ACTIVITIES: CPT

## 2021-01-01 PROCEDURE — 71275 CT ANGIOGRAPHY CHEST: CPT

## 2021-01-01 PROCEDURE — 99152 MOD SED SAME PHYS/QHP 5/>YRS: CPT | Performed by: INTERNAL MEDICINE

## 2021-01-01 PROCEDURE — 80053 COMPREHEN METABOLIC PANEL: CPT

## 2021-01-01 PROCEDURE — 6360000002 HC RX W HCPCS: Performed by: EMERGENCY MEDICINE

## 2021-01-01 PROCEDURE — 84132 ASSAY OF SERUM POTASSIUM: CPT

## 2021-01-01 PROCEDURE — 4004F PT TOBACCO SCREEN RCVD TLK: CPT | Performed by: INTERNAL MEDICINE

## 2021-01-01 PROCEDURE — G8484 FLU IMMUNIZE NO ADMIN: HCPCS | Performed by: INTERNAL MEDICINE

## 2021-01-01 PROCEDURE — 83036 HEMOGLOBIN GLYCOSYLATED A1C: CPT

## 2021-01-01 PROCEDURE — 97162 PT EVAL MOD COMPLEX 30 MIN: CPT

## 2021-01-01 PROCEDURE — 6370000000 HC RX 637 (ALT 250 FOR IP): Performed by: EMERGENCY MEDICINE

## 2021-01-01 PROCEDURE — 33249 INSJ/RPLCMT DEFIB W/LEAD(S): CPT | Performed by: INTERNAL MEDICINE

## 2021-01-01 PROCEDURE — 71045 X-RAY EXAM CHEST 1 VIEW: CPT

## 2021-01-01 PROCEDURE — 3017F COLORECTAL CA SCREEN DOC REV: CPT | Performed by: INTERNAL MEDICINE

## 2021-01-01 PROCEDURE — 85379 FIBRIN DEGRADATION QUANT: CPT

## 2021-01-01 PROCEDURE — 2060000000 HC ICU INTERMEDIATE R&B

## 2021-01-01 PROCEDURE — 1123F ACP DISCUSS/DSCN MKR DOCD: CPT | Performed by: INTERNAL MEDICINE

## 2021-01-01 PROCEDURE — 93246 EXT ECG>7D<15D RECORDING: CPT | Performed by: INTERNAL MEDICINE

## 2021-01-01 PROCEDURE — 99024 POSTOP FOLLOW-UP VISIT: CPT | Performed by: INTERNAL MEDICINE

## 2021-01-01 PROCEDURE — 99152 MOD SED SAME PHYS/QHP 5/>YRS: CPT

## 2021-01-01 PROCEDURE — 2500000003 HC RX 250 WO HCPCS

## 2021-01-01 PROCEDURE — 80162 ASSAY OF DIGOXIN TOTAL: CPT

## 2021-01-01 PROCEDURE — 83605 ASSAY OF LACTIC ACID: CPT

## 2021-01-01 PROCEDURE — 1111F DSCHRG MED/CURRENT MED MERGE: CPT | Performed by: INTERNAL MEDICINE

## 2021-01-01 PROCEDURE — 36600 WITHDRAWAL OF ARTERIAL BLOOD: CPT

## 2021-01-01 PROCEDURE — C1721 AICD, DUAL CHAMBER: HCPCS

## 2021-01-01 PROCEDURE — 87040 BLOOD CULTURE FOR BACTERIA: CPT

## 2021-01-01 PROCEDURE — 84443 ASSAY THYROID STIM HORMONE: CPT

## 2021-01-01 PROCEDURE — 6370000000 HC RX 637 (ALT 250 FOR IP): Performed by: STUDENT IN AN ORGANIZED HEALTH CARE EDUCATION/TRAINING PROGRAM

## 2021-01-01 PROCEDURE — C8929 TTE W OR WO FOL WCON,DOPPLER: HCPCS

## 2021-01-01 PROCEDURE — 6360000002 HC RX W HCPCS

## 2021-01-01 PROCEDURE — 93000 ELECTROCARDIOGRAM COMPLETE: CPT | Performed by: INTERNAL MEDICINE

## 2021-01-01 PROCEDURE — 93308 TTE F-UP OR LMTD: CPT

## 2021-01-01 RX ORDER — GABAPENTIN 100 MG/1
100 CAPSULE ORAL 3 TIMES DAILY
Status: DISCONTINUED | OUTPATIENT
Start: 2021-01-01 | End: 2021-01-01 | Stop reason: HOSPADM

## 2021-01-01 RX ORDER — ONDANSETRON 2 MG/ML
4 INJECTION INTRAMUSCULAR; INTRAVENOUS EVERY 6 HOURS PRN
Status: DISCONTINUED | OUTPATIENT
Start: 2021-01-01 | End: 2021-01-01 | Stop reason: HOSPADM

## 2021-01-01 RX ORDER — SODIUM CHLORIDE 9 MG/ML
25 INJECTION, SOLUTION INTRAVENOUS PRN
Status: DISCONTINUED | OUTPATIENT
Start: 2021-01-01 | End: 2021-01-01 | Stop reason: HOSPADM

## 2021-01-01 RX ORDER — ONDANSETRON 4 MG/1
4 TABLET, ORALLY DISINTEGRATING ORAL EVERY 8 HOURS PRN
Status: DISCONTINUED | OUTPATIENT
Start: 2021-01-01 | End: 2021-01-01 | Stop reason: HOSPADM

## 2021-01-01 RX ORDER — ACETAMINOPHEN 325 MG/1
650 TABLET ORAL EVERY 6 HOURS PRN
Status: DISCONTINUED | OUTPATIENT
Start: 2021-01-01 | End: 2021-01-01 | Stop reason: HOSPADM

## 2021-01-01 RX ORDER — AMIODARONE HYDROCHLORIDE 200 MG/1
200 TABLET ORAL 2 TIMES DAILY
Status: DISCONTINUED | OUTPATIENT
Start: 2021-01-01 | End: 2021-01-01 | Stop reason: HOSPADM

## 2021-01-01 RX ORDER — POTASSIUM CHLORIDE 20 MEQ/1
20 TABLET, EXTENDED RELEASE ORAL 2 TIMES DAILY
Status: DISCONTINUED | OUTPATIENT
Start: 2021-01-01 | End: 2021-01-01 | Stop reason: HOSPADM

## 2021-01-01 RX ORDER — ASPIRIN 81 MG/1
81 TABLET ORAL DAILY
Status: DISCONTINUED | OUTPATIENT
Start: 2021-01-01 | End: 2021-01-01 | Stop reason: HOSPADM

## 2021-01-01 RX ORDER — DEXTROSE MONOHYDRATE 25 G/50ML
12.5 INJECTION, SOLUTION INTRAVENOUS PRN
Status: DISCONTINUED | OUTPATIENT
Start: 2021-01-01 | End: 2021-01-01 | Stop reason: HOSPADM

## 2021-01-01 RX ORDER — SODIUM CHLORIDE 0.9 % (FLUSH) 0.9 %
5-40 SYRINGE (ML) INJECTION EVERY 12 HOURS SCHEDULED
Status: DISCONTINUED | OUTPATIENT
Start: 2021-01-01 | End: 2021-01-01 | Stop reason: HOSPADM

## 2021-01-01 RX ORDER — DIGOXIN 0.25 MG/ML
125 INJECTION INTRAMUSCULAR; INTRAVENOUS DAILY
Status: DISCONTINUED | OUTPATIENT
Start: 2021-01-01 | End: 2021-01-01

## 2021-01-01 RX ORDER — FUROSEMIDE 20 MG/1
20 TABLET ORAL 2 TIMES DAILY
Status: DISCONTINUED | OUTPATIENT
Start: 2021-01-01 | End: 2021-01-01

## 2021-01-01 RX ORDER — SITAGLIPTIN 25 MG/1
25 TABLET, FILM COATED ORAL DAILY
COMMUNITY
Start: 2021-01-01

## 2021-01-01 RX ORDER — HYDROCODONE BITARTRATE AND ACETAMINOPHEN 7.5; 325 MG/1; MG/1
1 TABLET ORAL 2 TIMES DAILY PRN
Status: ON HOLD | COMMUNITY
Start: 2021-01-01 | End: 2022-01-01 | Stop reason: SDUPTHER

## 2021-01-01 RX ORDER — SODIUM CHLORIDE 9 MG/ML
INJECTION, SOLUTION INTRAVENOUS CONTINUOUS
Status: DISCONTINUED | OUTPATIENT
Start: 2021-01-01 | End: 2021-01-01 | Stop reason: HOSPADM

## 2021-01-01 RX ORDER — ASPIRIN 325 MG
325 TABLET ORAL ONCE
Status: COMPLETED | OUTPATIENT
Start: 2021-01-01 | End: 2021-01-01

## 2021-01-01 RX ORDER — CARVEDILOL 3.12 MG/1
3.12 TABLET ORAL 2 TIMES DAILY
Qty: 60 TABLET | Refills: 5 | Status: SHIPPED | OUTPATIENT
Start: 2021-01-01 | End: 2022-01-01 | Stop reason: SDUPTHER

## 2021-01-01 RX ORDER — FUROSEMIDE 10 MG/ML
40 INJECTION INTRAMUSCULAR; INTRAVENOUS ONCE
Status: COMPLETED | OUTPATIENT
Start: 2021-01-01 | End: 2021-01-01

## 2021-01-01 RX ORDER — POTASSIUM CHLORIDE 20 MEQ/1
20 TABLET, EXTENDED RELEASE ORAL 2 TIMES DAILY
Qty: 60 TABLET | Refills: 5 | Status: SHIPPED | OUTPATIENT
Start: 2021-01-01 | End: 2022-01-01 | Stop reason: SDUPTHER

## 2021-01-01 RX ORDER — POTASSIUM CHLORIDE 20 MEQ/1
40 TABLET, EXTENDED RELEASE ORAL PRN
Status: DISCONTINUED | OUTPATIENT
Start: 2021-01-01 | End: 2021-01-01 | Stop reason: HOSPADM

## 2021-01-01 RX ORDER — POTASSIUM CHLORIDE 20 MEQ/1
40 TABLET, EXTENDED RELEASE ORAL ONCE
Status: COMPLETED | OUTPATIENT
Start: 2021-01-01 | End: 2021-01-01

## 2021-01-01 RX ORDER — POLYETHYLENE GLYCOL 3350 17 G/17G
17 POWDER, FOR SOLUTION ORAL DAILY PRN
Status: DISCONTINUED | OUTPATIENT
Start: 2021-01-01 | End: 2021-01-01 | Stop reason: HOSPADM

## 2021-01-01 RX ORDER — AMIODARONE HYDROCHLORIDE 200 MG/1
200 TABLET ORAL 2 TIMES DAILY
COMMUNITY
End: 2022-01-01 | Stop reason: SDUPTHER

## 2021-01-01 RX ORDER — FUROSEMIDE 10 MG/ML
40 INJECTION INTRAMUSCULAR; INTRAVENOUS EVERY 12 HOURS
Status: COMPLETED | OUTPATIENT
Start: 2021-01-01 | End: 2021-01-01

## 2021-01-01 RX ORDER — ATORVASTATIN CALCIUM 80 MG/1
80 TABLET, FILM COATED ORAL DAILY
Status: DISCONTINUED | OUTPATIENT
Start: 2021-01-01 | End: 2021-01-01 | Stop reason: HOSPADM

## 2021-01-01 RX ORDER — POTASSIUM CHLORIDE 20 MEQ/1
20 TABLET, EXTENDED RELEASE ORAL ONCE
Status: DISCONTINUED | OUTPATIENT
Start: 2021-01-01 | End: 2021-01-01 | Stop reason: HOSPADM

## 2021-01-01 RX ORDER — DIGOXIN 0.25 MG/ML
125 INJECTION INTRAMUSCULAR; INTRAVENOUS EVERY 6 HOURS
Status: COMPLETED | OUTPATIENT
Start: 2021-01-01 | End: 2021-01-01

## 2021-01-01 RX ORDER — NICOTINE POLACRILEX 4 MG
15 LOZENGE BUCCAL PRN
Status: DISCONTINUED | OUTPATIENT
Start: 2021-01-01 | End: 2021-01-01 | Stop reason: HOSPADM

## 2021-01-01 RX ORDER — DEXTROSE MONOHYDRATE 50 MG/ML
100 INJECTION, SOLUTION INTRAVENOUS PRN
Status: DISCONTINUED | OUTPATIENT
Start: 2021-01-01 | End: 2021-01-01 | Stop reason: HOSPADM

## 2021-01-01 RX ORDER — ALOGLIPTIN 12.5 MG/1
6.25 TABLET, FILM COATED ORAL DAILY
Status: DISCONTINUED | OUTPATIENT
Start: 2021-01-01 | End: 2021-01-01 | Stop reason: HOSPADM

## 2021-01-01 RX ORDER — CLOPIDOGREL BISULFATE 75 MG/1
75 TABLET ORAL DAILY
Status: DISCONTINUED | OUTPATIENT
Start: 2021-01-01 | End: 2021-01-01 | Stop reason: HOSPADM

## 2021-01-01 RX ORDER — FUROSEMIDE 40 MG/1
40 TABLET ORAL 2 TIMES DAILY
Status: DISCONTINUED | OUTPATIENT
Start: 2021-01-01 | End: 2021-01-01 | Stop reason: HOSPADM

## 2021-01-01 RX ORDER — FUROSEMIDE 10 MG/ML
40 INJECTION INTRAMUSCULAR; INTRAVENOUS 2 TIMES DAILY
Status: DISCONTINUED | OUTPATIENT
Start: 2021-01-01 | End: 2021-01-01

## 2021-01-01 RX ORDER — PANTOPRAZOLE SODIUM 40 MG/1
40 TABLET, DELAYED RELEASE ORAL
Status: DISCONTINUED | OUTPATIENT
Start: 2021-01-01 | End: 2021-01-01 | Stop reason: HOSPADM

## 2021-01-01 RX ORDER — MAGNESIUM SULFATE IN WATER 40 MG/ML
2000 INJECTION, SOLUTION INTRAVENOUS ONCE
Status: COMPLETED | OUTPATIENT
Start: 2021-01-01 | End: 2021-01-01

## 2021-01-01 RX ORDER — NALOXONE HYDROCHLORIDE 4 MG/.1ML
SPRAY NASAL
COMMUNITY
Start: 2021-01-01

## 2021-01-01 RX ORDER — ACETAMINOPHEN 650 MG/1
650 SUPPOSITORY RECTAL EVERY 6 HOURS PRN
Status: DISCONTINUED | OUTPATIENT
Start: 2021-01-01 | End: 2021-01-01 | Stop reason: HOSPADM

## 2021-01-01 RX ORDER — FUROSEMIDE 10 MG/ML
40 INJECTION INTRAMUSCULAR; INTRAVENOUS DAILY
Status: DISCONTINUED | OUTPATIENT
Start: 2021-01-01 | End: 2021-01-01

## 2021-01-01 RX ORDER — FUROSEMIDE 40 MG/1
40 TABLET ORAL 2 TIMES DAILY
Qty: 60 TABLET | Refills: 3 | Status: SHIPPED | OUTPATIENT
Start: 2021-01-01 | End: 2022-01-01 | Stop reason: SDUPTHER

## 2021-01-01 RX ORDER — SPIRONOLACTONE 25 MG/1
25 TABLET ORAL DAILY
Status: DISCONTINUED | OUTPATIENT
Start: 2021-01-01 | End: 2021-01-01 | Stop reason: HOSPADM

## 2021-01-01 RX ORDER — CARVEDILOL 12.5 MG/1
12.5 TABLET ORAL 2 TIMES DAILY
Status: DISCONTINUED | OUTPATIENT
Start: 2021-01-01 | End: 2021-01-01 | Stop reason: HOSPADM

## 2021-01-01 RX ORDER — LISINOPRIL 10 MG/1
10 TABLET ORAL DAILY
Status: DISCONTINUED | OUTPATIENT
Start: 2021-01-01 | End: 2021-01-01 | Stop reason: HOSPADM

## 2021-01-01 RX ORDER — DIGOXIN 0.25 MG/ML
250 INJECTION INTRAMUSCULAR; INTRAVENOUS ONCE
Status: COMPLETED | OUTPATIENT
Start: 2021-01-01 | End: 2021-01-01

## 2021-01-01 RX ORDER — AMIODARONE HYDROCHLORIDE 200 MG/1
200 TABLET ORAL 2 TIMES DAILY
Qty: 60 TABLET | Refills: 2 | Status: ON HOLD | OUTPATIENT
Start: 2021-01-01 | End: 2021-01-01

## 2021-01-01 RX ORDER — CARVEDILOL 3.12 MG/1
3.12 TABLET ORAL 2 TIMES DAILY
Status: DISCONTINUED | OUTPATIENT
Start: 2021-01-01 | End: 2021-01-01 | Stop reason: HOSPADM

## 2021-01-01 RX ORDER — POTASSIUM CHLORIDE 7.45 MG/ML
10 INJECTION INTRAVENOUS PRN
Status: DISCONTINUED | OUTPATIENT
Start: 2021-01-01 | End: 2021-01-01 | Stop reason: HOSPADM

## 2021-01-01 RX ORDER — FUROSEMIDE 10 MG/ML
INJECTION INTRAMUSCULAR; INTRAVENOUS
Status: DISCONTINUED
Start: 2021-01-01 | End: 2021-01-01 | Stop reason: HOSPADM

## 2021-01-01 RX ORDER — PROCHLORPERAZINE EDISYLATE 5 MG/ML
10 INJECTION INTRAMUSCULAR; INTRAVENOUS EVERY 6 HOURS PRN
Status: DISCONTINUED | OUTPATIENT
Start: 2021-01-01 | End: 2021-01-01 | Stop reason: HOSPADM

## 2021-01-01 RX ORDER — DIGOXIN 250 MCG
125 TABLET ORAL DAILY
Status: DISCONTINUED | OUTPATIENT
Start: 2021-01-01 | End: 2021-01-01

## 2021-01-01 RX ORDER — SODIUM CHLORIDE 0.9 % (FLUSH) 0.9 %
5-40 SYRINGE (ML) INJECTION PRN
Status: DISCONTINUED | OUTPATIENT
Start: 2021-01-01 | End: 2021-01-01 | Stop reason: HOSPADM

## 2021-01-01 RX ORDER — AMIODARONE HYDROCHLORIDE 200 MG/1
200 TABLET ORAL 2 TIMES DAILY
Status: DISCONTINUED | OUTPATIENT
Start: 2021-01-01 | End: 2021-01-01

## 2021-01-01 RX ORDER — CEFAZOLIN SODIUM 2 G/100ML
2000 INJECTION, SOLUTION INTRAVENOUS
Status: ACTIVE | OUTPATIENT
Start: 2021-01-01 | End: 2021-01-01

## 2021-01-01 RX ORDER — AMIODARONE HYDROCHLORIDE 200 MG/1
200 TABLET ORAL 3 TIMES DAILY
Status: DISCONTINUED | OUTPATIENT
Start: 2021-01-01 | End: 2021-01-01

## 2021-01-01 RX ADMIN — SODIUM CHLORIDE: 9 INJECTION, SOLUTION INTRAVENOUS at 08:23

## 2021-01-01 RX ADMIN — AMIODARONE HYDROCHLORIDE 200 MG: 200 TABLET ORAL at 15:41

## 2021-01-01 RX ADMIN — LISINOPRIL 10 MG: 10 TABLET ORAL at 10:25

## 2021-01-01 RX ADMIN — CLOPIDOGREL BISULFATE 75 MG: 75 TABLET ORAL at 08:06

## 2021-01-01 RX ADMIN — LISINOPRIL 10 MG: 10 TABLET ORAL at 09:12

## 2021-01-01 RX ADMIN — CARVEDILOL 3.12 MG: 3.12 TABLET, FILM COATED ORAL at 08:10

## 2021-01-01 RX ADMIN — SODIUM CHLORIDE, PRESERVATIVE FREE 10 ML: 5 INJECTION INTRAVENOUS at 10:38

## 2021-01-01 RX ADMIN — GABAPENTIN 100 MG: 100 CAPSULE ORAL at 08:10

## 2021-01-01 RX ADMIN — SODIUM CHLORIDE: 9 INJECTION, SOLUTION INTRAVENOUS at 18:57

## 2021-01-01 RX ADMIN — METOPROLOL TARTRATE 25 MG: 25 TABLET, FILM COATED ORAL at 08:55

## 2021-01-01 RX ADMIN — CARVEDILOL 3.12 MG: 3.12 TABLET, FILM COATED ORAL at 10:24

## 2021-01-01 RX ADMIN — ASPIRIN 81 MG: 81 TABLET, COATED ORAL at 10:25

## 2021-01-01 RX ADMIN — PANTOPRAZOLE SODIUM 40 MG: 40 TABLET, DELAYED RELEASE ORAL at 05:33

## 2021-01-01 RX ADMIN — GABAPENTIN 100 MG: 100 CAPSULE ORAL at 17:51

## 2021-01-01 RX ADMIN — AMIODARONE HYDROCHLORIDE 200 MG: 200 TABLET ORAL at 09:08

## 2021-01-01 RX ADMIN — ENOXAPARIN SODIUM 70 MG: 100 INJECTION SUBCUTANEOUS at 09:09

## 2021-01-01 RX ADMIN — INSULIN LISPRO 1 UNITS: 100 INJECTION, SOLUTION INTRAVENOUS; SUBCUTANEOUS at 12:02

## 2021-01-01 RX ADMIN — GABAPENTIN 100 MG: 100 CAPSULE ORAL at 09:12

## 2021-01-01 RX ADMIN — GABAPENTIN 100 MG: 100 CAPSULE ORAL at 09:08

## 2021-01-01 RX ADMIN — SODIUM CHLORIDE, PRESERVATIVE FREE 10 ML: 5 INJECTION INTRAVENOUS at 08:54

## 2021-01-01 RX ADMIN — INSULIN LISPRO 1 UNITS: 100 INJECTION, SOLUTION INTRAVENOUS; SUBCUTANEOUS at 11:56

## 2021-01-01 RX ADMIN — GABAPENTIN 100 MG: 100 CAPSULE ORAL at 23:22

## 2021-01-01 RX ADMIN — AMIODARONE HYDROCHLORIDE 200 MG: 200 TABLET ORAL at 13:57

## 2021-01-01 RX ADMIN — APIXABAN 5 MG: 5 TABLET, FILM COATED ORAL at 09:52

## 2021-01-01 RX ADMIN — GABAPENTIN 100 MG: 100 CAPSULE ORAL at 20:59

## 2021-01-01 RX ADMIN — GABAPENTIN 100 MG: 100 CAPSULE ORAL at 21:00

## 2021-01-01 RX ADMIN — GABAPENTIN 100 MG: 100 CAPSULE ORAL at 13:57

## 2021-01-01 RX ADMIN — ONDANSETRON 4 MG: 2 INJECTION INTRAMUSCULAR; INTRAVENOUS at 00:04

## 2021-01-01 RX ADMIN — GABAPENTIN 100 MG: 100 CAPSULE ORAL at 10:24

## 2021-01-01 RX ADMIN — POTASSIUM CHLORIDE 40 MEQ: 1500 TABLET, EXTENDED RELEASE ORAL at 12:48

## 2021-01-01 RX ADMIN — LISINOPRIL 10 MG: 10 TABLET ORAL at 08:07

## 2021-01-01 RX ADMIN — IOPAMIDOL 90 ML: 755 INJECTION, SOLUTION INTRAVENOUS at 13:57

## 2021-01-01 RX ADMIN — CLOPIDOGREL BISULFATE 75 MG: 75 TABLET ORAL at 08:11

## 2021-01-01 RX ADMIN — SODIUM CHLORIDE, PRESERVATIVE FREE 10 ML: 5 INJECTION INTRAVENOUS at 10:00

## 2021-01-01 RX ADMIN — ASPIRIN 81 MG: 81 TABLET, COATED ORAL at 09:08

## 2021-01-01 RX ADMIN — AMIODARONE HYDROCHLORIDE 200 MG: 200 TABLET ORAL at 08:49

## 2021-01-01 RX ADMIN — FUROSEMIDE 40 MG: 40 TABLET ORAL at 08:06

## 2021-01-01 RX ADMIN — GABAPENTIN 100 MG: 100 CAPSULE ORAL at 21:43

## 2021-01-01 RX ADMIN — LISINOPRIL 10 MG: 10 TABLET ORAL at 08:55

## 2021-01-01 RX ADMIN — CARVEDILOL 12.5 MG: 12.5 TABLET, FILM COATED ORAL at 08:06

## 2021-01-01 RX ADMIN — AMIODARONE HYDROCHLORIDE 200 MG: 200 TABLET ORAL at 21:00

## 2021-01-01 RX ADMIN — INSULIN LISPRO 1 UNITS: 100 INJECTION, SOLUTION INTRAVENOUS; SUBCUTANEOUS at 12:16

## 2021-01-01 RX ADMIN — AMIODARONE HYDROCHLORIDE 200 MG: 200 TABLET ORAL at 14:47

## 2021-01-01 RX ADMIN — GABAPENTIN 100 MG: 100 CAPSULE ORAL at 15:38

## 2021-01-01 RX ADMIN — ENOXAPARIN SODIUM 70 MG: 100 INJECTION SUBCUTANEOUS at 09:13

## 2021-01-01 RX ADMIN — ENOXAPARIN SODIUM 70 MG: 100 INJECTION SUBCUTANEOUS at 08:54

## 2021-01-01 RX ADMIN — FUROSEMIDE 40 MG: 40 TABLET ORAL at 18:36

## 2021-01-01 RX ADMIN — SODIUM CHLORIDE, PRESERVATIVE FREE 10 ML: 5 INJECTION INTRAVENOUS at 21:58

## 2021-01-01 RX ADMIN — AMIODARONE HYDROCHLORIDE 200 MG: 200 TABLET ORAL at 21:06

## 2021-01-01 RX ADMIN — METOPROLOL TARTRATE 25 MG: 25 TABLET, FILM COATED ORAL at 10:03

## 2021-01-01 RX ADMIN — AMIODARONE HYDROCHLORIDE 200 MG: 200 TABLET ORAL at 21:58

## 2021-01-01 RX ADMIN — CARVEDILOL 12.5 MG: 12.5 TABLET, FILM COATED ORAL at 15:33

## 2021-01-01 RX ADMIN — AMIODARONE HYDROCHLORIDE 200 MG: 200 TABLET ORAL at 15:49

## 2021-01-01 RX ADMIN — CLOPIDOGREL BISULFATE 75 MG: 75 TABLET ORAL at 09:08

## 2021-01-01 RX ADMIN — FUROSEMIDE 40 MG: 40 TABLET ORAL at 17:16

## 2021-01-01 RX ADMIN — AMIODARONE HYDROCHLORIDE 200 MG: 200 TABLET ORAL at 08:43

## 2021-01-01 RX ADMIN — SPIRONOLACTONE 25 MG: 25 TABLET ORAL at 15:33

## 2021-01-01 RX ADMIN — FUROSEMIDE 40 MG: 40 TABLET ORAL at 17:34

## 2021-01-01 RX ADMIN — SODIUM CHLORIDE, PRESERVATIVE FREE 10 ML: 5 INJECTION INTRAVENOUS at 15:52

## 2021-01-01 RX ADMIN — FUROSEMIDE 20 MG: 20 TABLET ORAL at 16:16

## 2021-01-01 RX ADMIN — AMIODARONE HYDROCHLORIDE 200 MG: 200 TABLET ORAL at 14:45

## 2021-01-01 RX ADMIN — POTASSIUM CHLORIDE 20 MEQ: 1500 TABLET, EXTENDED RELEASE ORAL at 10:24

## 2021-01-01 RX ADMIN — FUROSEMIDE 40 MG: 10 INJECTION, SOLUTION INTRAMUSCULAR; INTRAVENOUS at 14:47

## 2021-01-01 RX ADMIN — LISINOPRIL 10 MG: 10 TABLET ORAL at 08:11

## 2021-01-01 RX ADMIN — AMIODARONE HYDROCHLORIDE 200 MG: 200 TABLET ORAL at 20:49

## 2021-01-01 RX ADMIN — CLOPIDOGREL BISULFATE 75 MG: 75 TABLET ORAL at 08:56

## 2021-01-01 RX ADMIN — AMIODARONE HYDROCHLORIDE 200 MG: 200 TABLET ORAL at 08:06

## 2021-01-01 RX ADMIN — ATORVASTATIN CALCIUM 80 MG: 80 TABLET, FILM COATED ORAL at 09:12

## 2021-01-01 RX ADMIN — FUROSEMIDE 40 MG: 40 TABLET ORAL at 09:51

## 2021-01-01 RX ADMIN — FUROSEMIDE 40 MG: 40 TABLET ORAL at 08:10

## 2021-01-01 RX ADMIN — APIXABAN 5 MG: 5 TABLET, FILM COATED ORAL at 21:58

## 2021-01-01 RX ADMIN — AMIODARONE HYDROCHLORIDE 200 MG: 200 TABLET ORAL at 10:36

## 2021-01-01 RX ADMIN — FUROSEMIDE 40 MG: 10 INJECTION, SOLUTION INTRAMUSCULAR; INTRAVENOUS at 05:00

## 2021-01-01 RX ADMIN — FUROSEMIDE 40 MG: 10 INJECTION, SOLUTION INTRAMUSCULAR; INTRAVENOUS at 17:32

## 2021-01-01 RX ADMIN — ASPIRIN 81 MG: 81 TABLET, COATED ORAL at 08:06

## 2021-01-01 RX ADMIN — AMIODARONE HYDROCHLORIDE 200 MG: 200 TABLET ORAL at 09:51

## 2021-01-01 RX ADMIN — ATORVASTATIN CALCIUM 80 MG: 80 TABLET, FILM COATED ORAL at 08:55

## 2021-01-01 RX ADMIN — SPIRONOLACTONE 25 MG: 25 TABLET ORAL at 08:07

## 2021-01-01 RX ADMIN — INSULIN LISPRO 1 UNITS: 100 INJECTION, SOLUTION INTRAVENOUS; SUBCUTANEOUS at 20:58

## 2021-01-01 RX ADMIN — CLOPIDOGREL BISULFATE 75 MG: 75 TABLET ORAL at 10:25

## 2021-01-01 RX ADMIN — MAGNESIUM SULFATE HEPTAHYDRATE 2000 MG: 40 INJECTION, SOLUTION INTRAVENOUS at 10:59

## 2021-01-01 RX ADMIN — METOPROLOL TARTRATE 25 MG: 25 TABLET, FILM COATED ORAL at 21:57

## 2021-01-01 RX ADMIN — DIGOXIN 125 MCG: 250 TABLET ORAL at 08:55

## 2021-01-01 RX ADMIN — AMIODARONE HYDROCHLORIDE 200 MG: 200 TABLET ORAL at 20:59

## 2021-01-01 RX ADMIN — GABAPENTIN 100 MG: 100 CAPSULE ORAL at 21:56

## 2021-01-01 RX ADMIN — FUROSEMIDE 40 MG: 40 TABLET ORAL at 16:37

## 2021-01-01 RX ADMIN — POTASSIUM CHLORIDE 40 MEQ: 1500 TABLET, EXTENDED RELEASE ORAL at 09:52

## 2021-01-01 RX ADMIN — FUROSEMIDE 40 MG: 40 TABLET ORAL at 10:24

## 2021-01-01 RX ADMIN — ASPIRIN 81 MG: 81 TABLET, COATED ORAL at 08:10

## 2021-01-01 RX ADMIN — CARVEDILOL 3.12 MG: 3.12 TABLET, FILM COATED ORAL at 21:00

## 2021-01-01 RX ADMIN — INSULIN LISPRO 1 UNITS: 100 INJECTION, SOLUTION INTRAVENOUS; SUBCUTANEOUS at 12:57

## 2021-01-01 RX ADMIN — SODIUM CHLORIDE, PRESERVATIVE FREE 10 ML: 5 INJECTION INTRAVENOUS at 08:43

## 2021-01-01 RX ADMIN — INSULIN LISPRO 1 UNITS: 100 INJECTION, SOLUTION INTRAVENOUS; SUBCUTANEOUS at 09:50

## 2021-01-01 RX ADMIN — AMIODARONE HYDROCHLORIDE 200 MG: 200 TABLET ORAL at 09:12

## 2021-01-01 RX ADMIN — ENOXAPARIN SODIUM 70 MG: 100 INJECTION SUBCUTANEOUS at 21:57

## 2021-01-01 RX ADMIN — ENOXAPARIN SODIUM 70 MG: 100 INJECTION SUBCUTANEOUS at 08:06

## 2021-01-01 RX ADMIN — GABAPENTIN 100 MG: 100 CAPSULE ORAL at 13:54

## 2021-01-01 RX ADMIN — SODIUM CHLORIDE, PRESERVATIVE FREE 10 ML: 5 INJECTION INTRAVENOUS at 20:26

## 2021-01-01 RX ADMIN — AMIODARONE HYDROCHLORIDE 200 MG: 200 TABLET ORAL at 20:27

## 2021-01-01 RX ADMIN — FUROSEMIDE 40 MG: 40 TABLET ORAL at 09:12

## 2021-01-01 RX ADMIN — GABAPENTIN 100 MG: 100 CAPSULE ORAL at 08:55

## 2021-01-01 RX ADMIN — FUROSEMIDE 40 MG: 40 TABLET ORAL at 17:51

## 2021-01-01 RX ADMIN — LISINOPRIL 10 MG: 10 TABLET ORAL at 09:09

## 2021-01-01 RX ADMIN — DIGOXIN 125 MCG: 250 TABLET ORAL at 09:09

## 2021-01-01 RX ADMIN — ENOXAPARIN SODIUM 70 MG: 100 INJECTION SUBCUTANEOUS at 20:58

## 2021-01-01 RX ADMIN — ASPIRIN 325 MG: 325 TABLET ORAL at 19:22

## 2021-01-01 RX ADMIN — POTASSIUM CHLORIDE 20 MEQ: 1500 TABLET, EXTENDED RELEASE ORAL at 08:11

## 2021-01-01 RX ADMIN — CHLORHEXIDINE GLUCONATE: 4 LIQUID TOPICAL at 08:24

## 2021-01-01 RX ADMIN — CARVEDILOL 12.5 MG: 12.5 TABLET, FILM COATED ORAL at 20:59

## 2021-01-01 RX ADMIN — AMIODARONE HYDROCHLORIDE 200 MG: 200 TABLET ORAL at 08:54

## 2021-01-01 RX ADMIN — ASPIRIN 81 MG: 81 TABLET, COATED ORAL at 08:55

## 2021-01-01 RX ADMIN — SODIUM CHLORIDE, PRESERVATIVE FREE 10 ML: 5 INJECTION INTRAVENOUS at 20:49

## 2021-01-01 RX ADMIN — CLOPIDOGREL BISULFATE 75 MG: 75 TABLET ORAL at 09:12

## 2021-01-01 RX ADMIN — FUROSEMIDE 40 MG: 10 INJECTION, SOLUTION INTRAMUSCULAR; INTRAVENOUS at 15:49

## 2021-01-01 RX ADMIN — FUROSEMIDE 40 MG: 10 INJECTION, SOLUTION INTRAMUSCULAR; INTRAVENOUS at 16:53

## 2021-01-01 RX ADMIN — GABAPENTIN 100 MG: 100 CAPSULE ORAL at 14:08

## 2021-01-01 RX ADMIN — AMIODARONE HYDROCHLORIDE 200 MG: 200 TABLET ORAL at 08:10

## 2021-01-01 RX ADMIN — PANTOPRAZOLE SODIUM 40 MG: 40 TABLET, DELAYED RELEASE ORAL at 05:41

## 2021-01-01 RX ADMIN — CARVEDILOL 12.5 MG: 12.5 TABLET, FILM COATED ORAL at 21:06

## 2021-01-01 RX ADMIN — POTASSIUM CHLORIDE 40 MEQ: 1500 TABLET, EXTENDED RELEASE ORAL at 09:08

## 2021-01-01 RX ADMIN — SODIUM CHLORIDE, PRESERVATIVE FREE 10 ML: 5 INJECTION INTRAVENOUS at 21:03

## 2021-01-01 RX ADMIN — FUROSEMIDE 40 MG: 10 INJECTION, SOLUTION INTRAMUSCULAR; INTRAVENOUS at 03:43

## 2021-01-01 RX ADMIN — INSULIN LISPRO 1 UNITS: 100 INJECTION, SOLUTION INTRAVENOUS; SUBCUTANEOUS at 21:58

## 2021-01-01 RX ADMIN — PANTOPRAZOLE SODIUM 40 MG: 40 TABLET, DELAYED RELEASE ORAL at 05:43

## 2021-01-01 RX ADMIN — AMIODARONE HYDROCHLORIDE 200 MG: 200 TABLET ORAL at 20:25

## 2021-01-01 RX ADMIN — AMIODARONE HYDROCHLORIDE 200 MG: 200 TABLET ORAL at 21:43

## 2021-01-01 RX ADMIN — FUROSEMIDE 40 MG: 10 INJECTION, SOLUTION INTRAMUSCULAR; INTRAVENOUS at 08:55

## 2021-01-01 RX ADMIN — FUROSEMIDE 20 MG: 20 TABLET ORAL at 10:36

## 2021-01-01 RX ADMIN — GABAPENTIN 100 MG: 100 CAPSULE ORAL at 13:29

## 2021-01-01 RX ADMIN — APIXABAN 5 MG: 5 TABLET, FILM COATED ORAL at 11:49

## 2021-01-01 RX ADMIN — ASPIRIN 81 MG: 81 TABLET, COATED ORAL at 09:12

## 2021-01-01 RX ADMIN — FUROSEMIDE 40 MG: 10 INJECTION, SOLUTION INTRAMUSCULAR; INTRAVENOUS at 09:09

## 2021-01-01 RX ADMIN — INSULIN LISPRO 1 UNITS: 100 INJECTION, SOLUTION INTRAVENOUS; SUBCUTANEOUS at 17:34

## 2021-01-01 RX ADMIN — CARVEDILOL 3.12 MG: 3.12 TABLET, FILM COATED ORAL at 22:58

## 2021-01-01 RX ADMIN — ALOGLIPTIN 6.25 MG: 12.5 TABLET, FILM COATED ORAL at 10:25

## 2021-01-01 RX ADMIN — IOPAMIDOL 15 ML: 612 INJECTION, SOLUTION INTRAVENOUS at 12:45

## 2021-01-01 RX ADMIN — ENOXAPARIN SODIUM 70 MG: 100 INJECTION SUBCUTANEOUS at 21:06

## 2021-01-01 RX ADMIN — AMIODARONE HYDROCHLORIDE 200 MG: 200 TABLET ORAL at 21:03

## 2021-01-01 RX ADMIN — SODIUM CHLORIDE, PRESERVATIVE FREE 10 ML: 5 INJECTION INTRAVENOUS at 21:13

## 2021-01-01 RX ADMIN — AMIODARONE HYDROCHLORIDE 200 MG: 200 TABLET ORAL at 21:13

## 2021-01-01 RX ADMIN — SPIRONOLACTONE 25 MG: 25 TABLET ORAL at 09:09

## 2021-01-01 RX ADMIN — GABAPENTIN 100 MG: 100 CAPSULE ORAL at 08:07

## 2021-01-01 RX ADMIN — AMIODARONE HYDROCHLORIDE 200 MG: 200 TABLET ORAL at 10:25

## 2021-01-01 RX ADMIN — ATORVASTATIN CALCIUM 80 MG: 80 TABLET, FILM COATED ORAL at 08:06

## 2021-01-01 RX ADMIN — GABAPENTIN 100 MG: 100 CAPSULE ORAL at 21:06

## 2021-01-01 RX ADMIN — FUROSEMIDE 40 MG: 10 INJECTION, SOLUTION INTRAMUSCULAR; INTRAVENOUS at 19:22

## 2021-01-01 RX ADMIN — INSULIN LISPRO 1 UNITS: 100 INJECTION, SOLUTION INTRAVENOUS; SUBCUTANEOUS at 21:07

## 2021-01-01 RX ADMIN — ALOGLIPTIN 6.25 MG: 12.5 TABLET, FILM COATED ORAL at 08:10

## 2021-01-01 RX ADMIN — SODIUM CHLORIDE, PRESERVATIVE FREE 10 ML: 5 INJECTION INTRAVENOUS at 20:25

## 2021-01-01 RX ADMIN — SODIUM CHLORIDE, PRESERVATIVE FREE 10 ML: 5 INJECTION INTRAVENOUS at 09:13

## 2021-01-01 RX ADMIN — ATORVASTATIN CALCIUM 80 MG: 80 TABLET, FILM COATED ORAL at 09:08

## 2021-01-01 RX ADMIN — POTASSIUM CHLORIDE 20 MEQ: 1500 TABLET, EXTENDED RELEASE ORAL at 21:43

## 2021-01-01 RX ADMIN — SODIUM CHLORIDE, PRESERVATIVE FREE 10 ML: 5 INJECTION INTRAVENOUS at 08:50

## 2021-01-01 RX ADMIN — MUPIROCIN: 20 OINTMENT TOPICAL at 08:28

## 2021-01-01 RX ADMIN — CARVEDILOL 12.5 MG: 12.5 TABLET, FILM COATED ORAL at 09:08

## 2021-01-01 ASSESSMENT — PAIN SCALES - GENERAL
PAINLEVEL_OUTOF10: 0

## 2021-01-01 ASSESSMENT — ENCOUNTER SYMPTOMS
DIARRHEA: 0
BLOOD IN STOOL: 0
DIARRHEA: 0
SHORTNESS OF BREATH: 0
ABDOMINAL PAIN: 0
NAUSEA: 0
RESPIRATORY NEGATIVE: 1
VOMITING: 0
GASTROINTESTINAL NEGATIVE: 1
ABDOMINAL DISTENTION: 0
COUGH: 0
NAUSEA: 0
WHEEZING: 0
DIARRHEA: 0
EYES NEGATIVE: 1
SHORTNESS OF BREATH: 0
COLOR CHANGE: 0
VOMITING: 0
VOMITING: 0
NAUSEA: 0
SHORTNESS OF BREATH: 1
RESPIRATORY NEGATIVE: 1
EYES NEGATIVE: 1
CONSTIPATION: 0
SHORTNESS OF BREATH: 0
GASTROINTESTINAL NEGATIVE: 1

## 2021-01-18 ENCOUNTER — TELEPHONE (OUTPATIENT)
Dept: CARDIOLOGY CLINIC | Age: 71
End: 2021-01-18

## 2021-01-18 NOTE — TELEPHONE ENCOUNTER
Patients daughter called in stating they were at a physicians office right now for patient to get a shot and he has stopped his plavix for 7 days already for this. The office says they will not do the shot because we did not say it was okay. I did ask for the number they sent the request to because they sent it four times. The information they gave was not for our office and a physician I have never heard of. Which would be why we did not get the request. I said I could call Dr. Slick Ham right now to get the approval she said no because they are already behind and they can't do it today or any this week. Patient instructed to start back plavix and gave new information to our office to send the request to.

## 2021-01-19 ENCOUNTER — TELEPHONE (OUTPATIENT)
Dept: CARDIOLOGY CLINIC | Age: 71
End: 2021-01-19

## 2021-01-19 NOTE — TELEPHONE ENCOUNTER
Date of Surgery: TBD     Cardiologist: Dr. Rito Duke     Procedure: Lumbar epidural steroid injection     Surgeon: Dr. Vladimir Mota Visit: 1-30-20    Reason for office visit and medical concerns addressed at this office visit: CHF, HTN, CAD, DM     Testing Performed and Date of Service:  EKG 6-27-19     Does the patient have a stent? If so, what type? Not within last year     Current Medications: Plavix, lisinopril, neurontin, protonix, lipitor, valium, coreg, ASA, glucotrol, lisinopril      Is the patient currently taking an anticoagulant? If so, what is the diagnosis the patient has been given to warrant the need for the anticoagulant?  Plavix      Additional Notes: Med hold for Plavix x 7 days

## 2021-03-12 ENCOUNTER — HOSPITAL ENCOUNTER (OUTPATIENT)
Dept: NEUROLOGY | Age: 71
Discharge: HOME | End: 2021-03-12
Payer: MEDICARE

## 2021-03-12 PROCEDURE — 95909 NRV CNDJ TST 5-6 STUDIES: CPT

## 2021-03-12 PROCEDURE — 95886 MUSC TEST DONE W/N TEST COMP: CPT

## 2021-03-12 PROCEDURE — 95909 NRV CNDJ TST 5-6 STUDIES: CPT | Performed by: PSYCHIATRY & NEUROLOGY

## 2021-03-12 PROCEDURE — 95886 MUSC TEST DONE W/N TEST COMP: CPT | Performed by: PSYCHIATRY & NEUROLOGY

## 2021-03-15 ENCOUNTER — OFFICE VISIT (OUTPATIENT)
Dept: NEUROSURGERY | Age: 71
End: 2021-03-15

## 2021-03-15 ENCOUNTER — TELEPHONE (OUTPATIENT)
Dept: NEUROSURGERY | Age: 71
End: 2021-03-15

## 2021-03-15 VITALS
TEMPERATURE: 98.9 F | HEART RATE: 88 BPM | OXYGEN SATURATION: 100 % | BODY MASS INDEX: 20.04 KG/M2 | HEIGHT: 70 IN | WEIGHT: 140 LBS | SYSTOLIC BLOOD PRESSURE: 128 MMHG | DIASTOLIC BLOOD PRESSURE: 80 MMHG

## 2021-03-15 DIAGNOSIS — G62.9 NEUROPATHY: ICD-10-CM

## 2021-03-15 DIAGNOSIS — R79.9 ABNORMAL FINDING OF BLOOD CHEMISTRY, UNSPECIFIED: ICD-10-CM

## 2021-03-15 DIAGNOSIS — G71.9 PRIMARY DISORDER OF MUSCLE, UNSPECIFIED: ICD-10-CM

## 2021-03-15 DIAGNOSIS — G89.29 CHRONIC RIGHT-SIDED LOW BACK PAIN WITHOUT SCIATICA: ICD-10-CM

## 2021-03-15 DIAGNOSIS — M21.371 RIGHT FOOT DROP: Primary | ICD-10-CM

## 2021-03-15 DIAGNOSIS — M54.50 CHRONIC RIGHT-SIDED LOW BACK PAIN WITHOUT SCIATICA: ICD-10-CM

## 2021-03-15 LAB
ALBUMIN SERPL-MCNC: 3.5 G/DL (ref 3.5–5.2)
ALP BLD-CCNC: 91 U/L (ref 40–130)
ALT SERPL-CCNC: 15 U/L (ref 5–41)
ANION GAP SERPL CALCULATED.3IONS-SCNC: 11 MMOL/L (ref 7–19)
AST SERPL-CCNC: 9 U/L (ref 5–40)
BASOPHILS ABSOLUTE: 0 K/UL (ref 0–0.2)
BASOPHILS RELATIVE PERCENT: 1.1 % (ref 0–1)
BILIRUB SERPL-MCNC: 0.5 MG/DL (ref 0.2–1.2)
BUN BLDV-MCNC: 5 MG/DL (ref 8–23)
C-REACTIVE PROTEIN: 0.14 MG/DL (ref 0–0.5)
CALCIUM SERPL-MCNC: 8.8 MG/DL (ref 8.8–10.2)
CHLORIDE BLD-SCNC: 101 MMOL/L (ref 98–111)
CO2: 26 MMOL/L (ref 22–29)
CREAT SERPL-MCNC: 0.6 MG/DL (ref 0.5–1.2)
EOSINOPHILS ABSOLUTE: 0.1 K/UL (ref 0–0.6)
EOSINOPHILS RELATIVE PERCENT: 2.5 % (ref 0–5)
GFR AFRICAN AMERICAN: >59
GFR NON-AFRICAN AMERICAN: >60
GLUCOSE BLD-MCNC: 408 MG/DL (ref 74–109)
HBA1C MFR BLD: 9.3 % (ref 4–6)
HCT VFR BLD CALC: 37.5 % (ref 42–52)
HEMOGLOBIN: 13 G/DL (ref 14–18)
HIV-1 P24 AG: NORMAL
IMMATURE GRANULOCYTES #: 0 K/UL
LYMPHOCYTES ABSOLUTE: 0.9 K/UL (ref 1.1–4.5)
LYMPHOCYTES RELATIVE PERCENT: 23.5 % (ref 20–40)
MCH RBC QN AUTO: 30.7 PG (ref 27–31)
MCHC RBC AUTO-ENTMCNC: 34.7 G/DL (ref 33–37)
MCV RBC AUTO: 88.4 FL (ref 80–94)
MONOCYTES ABSOLUTE: 0.3 K/UL (ref 0–0.9)
MONOCYTES RELATIVE PERCENT: 7.2 % (ref 0–10)
NEUTROPHILS ABSOLUTE: 2.4 K/UL (ref 1.5–7.5)
NEUTROPHILS RELATIVE PERCENT: 65.4 % (ref 50–65)
PDW BLD-RTO: 13.1 % (ref 11.5–14.5)
PLATELET # BLD: 188 K/UL (ref 130–400)
PMV BLD AUTO: 10 FL (ref 9.4–12.4)
POTASSIUM SERPL-SCNC: 3.6 MMOL/L (ref 3.5–5)
RAPID HIV 1&2: NORMAL
RBC # BLD: 4.24 M/UL (ref 4.7–6.1)
RHEUMATOID FACTOR: <10 IU/ML
SEDIMENTATION RATE, ERYTHROCYTE: 8 MM/HR (ref 0–15)
SODIUM BLD-SCNC: 138 MMOL/L (ref 136–145)
T4 FREE: 1.34 NG/DL (ref 0.93–1.7)
TOTAL PROTEIN: 6 G/DL (ref 6.6–8.7)
TSH REFLEX FT4: 4.27 UIU/ML (ref 0.35–5.5)
VITAMIN B-12: 345 PG/ML (ref 211–946)
WBC # BLD: 3.6 K/UL (ref 4.8–10.8)

## 2021-03-15 PROCEDURE — 4040F PNEUMOC VAC/ADMIN/RCVD: CPT | Performed by: NURSE PRACTITIONER

## 2021-03-15 PROCEDURE — G8420 CALC BMI NORM PARAMETERS: HCPCS | Performed by: NURSE PRACTITIONER

## 2021-03-15 PROCEDURE — 4004F PT TOBACCO SCREEN RCVD TLK: CPT | Performed by: NURSE PRACTITIONER

## 2021-03-15 PROCEDURE — 3017F COLORECTAL CA SCREEN DOC REV: CPT | Performed by: NURSE PRACTITIONER

## 2021-03-15 PROCEDURE — 99204 OFFICE O/P NEW MOD 45 MIN: CPT | Performed by: NURSE PRACTITIONER

## 2021-03-15 PROCEDURE — G8427 DOCREV CUR MEDS BY ELIG CLIN: HCPCS | Performed by: NURSE PRACTITIONER

## 2021-03-15 PROCEDURE — G8484 FLU IMMUNIZE NO ADMIN: HCPCS | Performed by: NURSE PRACTITIONER

## 2021-03-15 PROCEDURE — 1123F ACP DISCUSS/DSCN MKR DOCD: CPT | Performed by: NURSE PRACTITIONER

## 2021-03-15 RX ORDER — ONDANSETRON 4 MG/1
TABLET, ORALLY DISINTEGRATING ORAL
COMMUNITY
End: 2021-01-01

## 2021-03-15 NOTE — TELEPHONE ENCOUNTER
Called pt no answer voicemail box not set up at this time. Called lucía pt daughter  About pt blood sugar. Pt daughter voiced understanding and would get ahold of patient or his wife.

## 2021-03-15 NOTE — PROGRESS NOTES
[facility] Neurology Office Note      Patient:   Molina Ortega MR#:    [id number]  Account Number:                         YOB: 1950  Date of Evaluation:  3/15/2021  Time of Note:                          11:40 AM  Primary/Referring Physician:  LO Conteh - CNP   Consulting Physician:  LO Cardoso    NEW PATIENT CONSULTATION    Chief Complaint   Patient presents with    New Patient     c/o not being able to walk since fall 3 years ago       [address] is a 66y.o. year old male here for evaluation of gait changes, weakness, right foot drop. Symptoms have been present for about 1.5- 2 years now. About 2 years ago his daughter reports that he fell down the stairs, she questions if symptoms started around that time. He initially noted imbalance, difficulty walking and that has slowly progressed. He notes lower back pain with radiation of pain into the right leg. Denies left leg pain. He doesn't have sensation from knees to his ankles. Denies incontinence. Denies saddle anesthesia. He uses a walker at home, wheelchair when out. Last imaging was about a year ago. He is following with Dr. Ibrahima Westbrook for back pain/leg pain, injections have been helpful. Has never seen neurosurgery. Questionable stroke history, had a brief period of right sided weakness involving the leg and arm. He is a diabetic. No vitamin B12 deficiency. He does have a history of prostate cancer with radiation treatments, no chemotherapy. He follow with urology and cardiology here. Past Medical History:   Diagnosis Date    Arthritis     Asthma     Cancer ([address] 08/2018    Prostrate.      Coronary artery disease 12/17/2018    Diabetes mellitus ([address]     Hypercholesterolemia 12/17/2018    Hypertension     Hypothyroidism 7/6/2018    Rheumatic fever     as child    Systolic congestive heart failure ([address] 12/17/2018       Past Surgical History:   Procedure Laterality Date    APPENDECTOMY      CHOLECYSTECTOMY      PANCREAS SURGERY      STENT PLACED IN BILE DUCT       Family History   Problem Relation Age of Onset    Cancer Mother     Tuberculosis Father        Social History     Socioeconomic History    Marital status:      Spouse name: Not on file    Number of children: Not on file    Years of education: Not on file    Highest education level: Not on file   Occupational History    Not on file   Social Needs    Financial resource strain: Not on file    Food insecurity     Worry: Not on file     Inability: Not on file    Transportation needs     Medical: Not on file     Non-medical: Not on file   Tobacco Use    Smoking status: Never Smoker    Smokeless tobacco: Current User     Types: Chew   Substance and Sexual Activity    Alcohol use: Yes     Comment: occ    Drug use: No    Sexual activity: Not on file   Lifestyle    Physical activity     Days per week: Not on file     Minutes per session: Not on file    Stress: Not on file   Relationships    Social connections     Talks on phone: Not on file     Gets together: Not on file     Attends Confucianist service: Not on file     Active member of club or organization: Not on file     Attends meetings of clubs or organizations: Not on file     Relationship status: Not on file    Intimate partner violence     Fear of current or ex partner: Not on file     Emotionally abused: Not on file     Physically abused: Not on file     Forced sexual activity: Not on file   Other Topics Concern    Not on file   Social History Narrative    Not on file       Current Outpatient Medications   Medication Sig Dispense Refill    insulin NPH (NOVOLIN N) 100 UNIT/ML injection vial Novolin N NPH U-100 Insulin isophane 100 unit/mL subcutaneous susp      ondansetron (ZOFRAN-ODT) 4 MG disintegrating tablet ondansetron 4 mg disintegrating tablet   Place 1 tablet every 4-6 hours by translingual route as needed.       atorvastatin (LIPITOR) 80 MG tablet Take 1 tablet by mouth daily 90 tablet 0    doxyLAMINE succinate (SLEEP AID) 25 MG tablet Take 25 mg by mouth nightly      gabapentin (NEURONTIN) 100 MG capsule Take 1 capsule by mouth 3 times daily for 30 days. 90 capsule 0    pantoprazole (PROTONIX) 40 MG tablet       clopidogrel (PLAVIX) 75 MG tablet Take 1 tablet by mouth daily 30 tablet 5    aspirin EC 81 MG EC tablet Take 1 tablet by mouth daily 30 tablet 5    glipiZIDE (GLUCOTROL) 5 MG tablet Take 5 mg by mouth daily       lisinopril (PRINIVIL;ZESTRIL) 10 MG tablet lisinopril 10 mg tablet  DAILY       No current facility-administered medications for this visit. Allergies   Allergen Reactions    Phenergan [Promethazine Hcl]      REVIEW OF SYSTEMS  Constitutional: []? Fever []? Sweats []? Chills []? Recent Injury [x]? Denies all unless marked  HEENT:[]? Headache  []? Head Injury []? Hearing Loss  []? Sore Throat  []? Ear Ache [x]? Denies all unless marked  Spine:  []? Neck pain  [x]? Back pain  []? Joan Zenobia  []? Denies all unless marked  Cardiovascular:[x]? Heart Disease []? Palpitations []? Chest Pain   []? Denies all unless marked  Pulmonary: []? Shortness of Breath []? Cough   [x]? Denies all unless marked  Psychiatric/Behavioral:[x]? Depression []? Anxiety []? Denies all unless marked  Gastrointestinal: []? Nausea  []? Vomiting  []? Abdominal Pain  []? Constipation  []? Diarrhea  [x]? Denies all unless marked  Genitourinary:   []? Frequency  []? Urgency  []? Dysuria []? Incontinence  [x]? Denies all unless marked  Extremities: [x]? Pain  [x]? Swelling  []? Denies all unless marked  Musculoskeletal: []? Myalgias  []? Joint Pain  [x]? Arthritis [x]? Muscle Cramps []? Muscle Twitches  []? Denies all unless marked  Sleep: [x]? Insomnia[]? Snoring []? Restless Legs  [x]? Sleep Apnea  []? Daytime Sleepiness  []? Denies all unless marked  Skin:[]? Rash []? Color Change [x]? Denies all unless marked   Neurological:[]? Visual Disturbance []?  Memory Loss [x]?Loss of Balance []? Slurred Speech [x]? Weakness []? Seizures  []? Dizziness []? Denies all unless marked    The MA has completed the ROS with the patient. I have reviewed it in its' entirety with the patient and agree with the documentation. PHYSICAL EXAM  /80   Pulse 88   Temp 98.9 °F (37.2 °C)   Ht 5' 10\" (1.778 m)   Wt 140 lb (63.5 kg)   SpO2 100%   BMI 20.09 kg/m²       Constitutional - No acute distress    HEENT- Conjunctiva normal.  No scars, masses, or lesions over external nose or ears, no neck masses noted, no jugular vein distension, no bruit  Cardiac- Regular rate and rhythm  Pulmonary- Good expansion, normal effort without use of accessory muscles  Musculoskeletal - No significant wasting of muscles noted, no bony deformities  Extremities - No clubbing, cyanosis or edema  Skin - Warm, dry, and intact. No rash, erythema, or pallor  Psychiatric - Mood, affect, and behavior appear normal      NEUROLOGICAL EXAM     Mental status   [x] Awake, alert, oriented   [x]Affect attention and concentration appear appropriate  [x]Recent and remote memory appears unremarkable  [x]Speech normal without dysarthria or aphasia, comprehension and repetition intact.    COMMENTS:    Cranial Nerves [x]No VF deficit to confrontation,  no papilledema on fundoscopic exam.  [x]PERRLA, EOMI, no nystagmus, conjugate eye movements, no ptosis  [x]Face symmetric  [x]Facial sensation intact  [x]Tongue midline no atrophy or fasciculations present  [x]Palate midline, hearing to finger rub normal bilaterally  [x]Shoulder shrug and SCM testing normal bilaterally  COMMENTS:   Motor   []5/5 strength x 4 extremities  []Normal bulk and tone  [x]No tremor present  [x]No rigidity or bradykinesia noted  COMMENTS: right foot drop, right EHL weakness; right calf atrophy; left foot 4/5    Sensory  []Sensation intact to light touch, pin prick, vibration, and proprioception BLE  [x]Sensation intact to light touch, pin prick, vibration, and proprioception BUE  COMMENTS: decreased PP, vib BLE R>L   Coordination [x]FTN normal bilaterally   [x]HTS normal bilaterally  [x]SONDRA normal bilaterally. COMMENTS:    Reflexes  []Symmetric and non-pathological  [x]Toes down going bilaterally  [x]No clonus present  COMMENTS: absent right patellar/achilles; decreased left leg   Gait                  []Normal steady gait    []Ataxic    []Spastic     []Magnetic     []Shuffling  COMMENTS: in wheelchair, not tested        LABS RECORD AND IMAGING REVIEW (As below and per HPI)    No results found for: HYCPVHGQ60  Lab Results   Component Value Date    WBC 5.7 12/04/2018    HGB 13.0 (L) 12/04/2018    HCT 37.2 (L) 12/04/2018    MCV 88.6 12/04/2018     12/04/2018     Lab Results   Component Value Date     08/26/2019    K 4.3 08/26/2019    CL 99 08/26/2019    CO2 25 08/26/2019    BUN 6 (L) 08/26/2019    CREATININE 0.6 08/26/2019    GLUCOSE 627 (HH) 08/26/2019    CALCIUM 9.1 08/26/2019    PROT 6.1 (L) 12/04/2018    LABALBU 3.7 12/04/2018    BILITOT 0.5 12/04/2018    ALKPHOS 63 12/04/2018    AST 10 12/04/2018    ALT 14 12/04/2018    LABGLOM >60 08/26/2019     Lab Results   Component Value Date    CHOL 133 (L) 02/18/2019    TRIG 81 02/18/2019    HDL 51 (L) 02/18/2019    LDLCALC 66 02/18/2019     No results found for: TSH, T4FREE  No results found for: CRP, SEDRATE     NCS/EMG (3/2021)- severe generalized sensory motor peripheral neuropathy and severe chronic right L5/S1 radiculopathies     Reviewed referral records     ASSESSMENT:    Paras Tam is a 66y.o. year old male here for evaluation of back pain, right foot drop, neuropathy. On exam today he has right foot drop, EHL weakness, right calf atrophy. Left sided dorsiflexion is 4/5. Reflexes are abnormal in BLE, absent/hypoactive. No clear UMN findings on exam. Recent NCS with severe sensorimotor peripheral neuropathy and L5/S1 radiculopathy.  Suspect that his foot drop is stemming from his lumbar spine and neuropathy, MND felt less likely. If symptoms have been ongoing for 1-2 years now, unclear if he will regain any strength, discussed with patient and daughter. Will plan for additional work up with MRI brain, MRI lumbar spine and labs today. Will also get AFO for patient. May need to consider therapy once work up is completed but want to clarify source first.       ICD-10-CM    1. Right foot drop  M21.371 MRI LUMBAR SPINE W WO CONTRAST     MRI BRAIN W WO CONTRAST   2. Chronic right-sided low back pain without sciatica  M54.5 MRI LUMBAR SPINE W WO CONTRAST    G89.29    3. Neuropathy  G62.9 MRI LUMBAR SPINE W WO CONTRAST     CBC Auto Differential     Comprehensive Metabolic Panel     C-Reactive Protein     Hemoglobin A1C     HIV Screen     Lyme Disease AB Total W Rflx to IgG/ IgM     Electrophoresis Protein, Serum without Reflex to Immunofixation     Rheumatoid Factor     Sedimentation Rate     TSH WITH REFLEX TO FT4     Vitamin B12   4. Abnormal finding of blood chemistry, unspecified   R79.9 Hemoglobin A1C   5. Primary disorder of muscle, unspecified   G71.9 TSH WITH REFLEX TO FT4       PLAN:  1. MRI lumbar spine   2. Labs as listed above   3. AFO, script given   4. MRI brain   5. Fall precautions, use walker/wheelchair. 6. Return in about 2 months (around 5/15/2021) for follow up, sooner if worsening.     Sachi Wood DNP, APRN

## 2021-03-15 NOTE — PROGRESS NOTES
REVIEW OF SYSTEMS    Constitutional: []Fever []Sweats []Chills [] Recent Injury [x] Denies all unless marked  HEENT:[]Headache  [] Head Injury [] Hearing Loss  [] Sore Throat  [] Ear Ache [x] Denies all unless marked  Spine:  [] Neck pain  [x] Back pain  [] Sciaticia  [] Denies all unless marked  Cardiovascular:[x]Heart Disease []Palpitations [] Chest Pain   [] Denies all unless marked  Pulmonary: []Shortness of Breath []Cough   [x] Denies all unless marked  Psychiatric/Behavioral:[x] Depression [] Anxiety [] Denies all unless marked  Gastrointestinal: []Nausea  []Vomiting  []Abdominal Pain  []Constipation  []Diarrhea  [x] Denies all unless marked  Genitourinary:   [] Frequency  [] Urgency  [] Dysuria [] Incontinence  [x] Denies all unless marked  Extremities: [x]Pain  [x]Swelling  [] Denies all unless marked  Musculoskeletal: [] Myalgias  [] Joint Pain  [x] Arthritis [x] Muscle Cramps [] Muscle Twitches  [] Denies all unless marked  Sleep: [x]Insomnia[]Snoring []Restless Legs  [x]Sleep Apnea  []Daytime Sleepiness  [] Denies all unless marked  Skin:[] Rash [] Color Change [x] Denies all unless marked   Neurological:[]Visual Disturbance [] Memory Loss [x]Loss of Balance []Slurred Speech [x]Weakness []Seizures  [] Dizziness [] Denies all unless marked

## 2021-03-17 LAB
LYME (B. BURGDORFERI) AB IGG WB: NEGATIVE
LYME AB IGM BY WB:: NEGATIVE

## 2021-03-19 LAB
ALBUMIN SERPL-MCNC: 3.42 G/DL (ref 3.75–5.01)
ALPHA-1-GLOBULIN: 0.3 G/DL (ref 0.19–0.46)
ALPHA-2-GLOBULIN: 0.54 G/DL (ref 0.48–1.05)
BETA GLOBULIN: 0.69 G/DL (ref 0.48–1.1)
GAMMA GLOBULIN: 0.75 G/DL (ref 0.62–1.51)
PROTEIN ELECTROPHORESIS, SERUM: ABNORMAL
SPE/IFE INTERPRETATION: ABNORMAL
TOTAL PROTEIN: 5.7 G/DL (ref 6.3–8.2)

## 2021-04-09 ENCOUNTER — HOSPITAL ENCOUNTER (OUTPATIENT)
Dept: MRI IMAGING | Age: 71
Discharge: HOME | End: 2021-04-09
Payer: MEDICARE

## 2021-04-09 DIAGNOSIS — G62.9 NEUROPATHY: ICD-10-CM

## 2021-04-09 DIAGNOSIS — M21.371 RIGHT FOOT DROP: ICD-10-CM

## 2021-04-09 DIAGNOSIS — M54.50 CHRONIC RIGHT-SIDED LOW BACK PAIN WITHOUT SCIATICA: ICD-10-CM

## 2021-04-09 DIAGNOSIS — G89.29 CHRONIC RIGHT-SIDED LOW BACK PAIN WITHOUT SCIATICA: ICD-10-CM

## 2021-04-09 PROCEDURE — 70553 MRI BRAIN STEM W/O & W/DYE: CPT

## 2021-04-09 PROCEDURE — A9577 INJ MULTIHANCE: HCPCS | Performed by: NURSE PRACTITIONER

## 2021-04-09 PROCEDURE — 72158 MRI LUMBAR SPINE W/O & W/DYE: CPT

## 2021-04-09 PROCEDURE — 6360000004 HC RX CONTRAST MEDICATION: Performed by: NURSE PRACTITIONER

## 2021-04-09 RX ADMIN — GADOBENATE DIMEGLUMINE 12 ML: 529 INJECTION, SOLUTION INTRAVENOUS at 12:54

## 2021-04-12 ENCOUNTER — TELEPHONE (OUTPATIENT)
Dept: NEUROSURGERY | Age: 71
End: 2021-04-12

## 2021-04-12 NOTE — TELEPHONE ENCOUNTER
Called patient to discuss results as provided below. Pt voiced understanding at this time. Wish to hold off on PT due to daughters work schedule which is his means of transportation. Sooner follow up provided at this time.

## 2021-04-12 NOTE — TELEPHONE ENCOUNTER
----- Message from Gael Napoles [place] sent at 4/9/2021  3:38 PM CDT -----  The MRI L-S spine showed chronic degenerative changes at L5-S1, which is consistent with the NCS findings. There is narrowing at the neural foramina at L5-S1 more severe on the left. It appears that the spine changes are greatest on the left, which would not be consistent with the right foot drop. I would suggest PT evaluate and treat 3 x week x 8 weeks. It is also noted that he may have a chronic bone marrow disorder. Is he aware and/or being treated? He may need additional studies. He can make a sooner follow up with EG.

## 2021-05-06 ENCOUNTER — OFFICE VISIT (OUTPATIENT)
Dept: NEUROSURGERY | Age: 71
End: 2021-05-06

## 2021-05-06 VITALS
SYSTOLIC BLOOD PRESSURE: 141 MMHG | HEIGHT: 70 IN | TEMPERATURE: 97.1 F | WEIGHT: 140 LBS | HEART RATE: 94 BPM | OXYGEN SATURATION: 97 % | BODY MASS INDEX: 20.04 KG/M2 | DIASTOLIC BLOOD PRESSURE: 92 MMHG

## 2021-05-06 DIAGNOSIS — M54.50 CHRONIC RIGHT-SIDED LOW BACK PAIN WITHOUT SCIATICA: ICD-10-CM

## 2021-05-06 DIAGNOSIS — G62.9 NEUROPATHY: ICD-10-CM

## 2021-05-06 DIAGNOSIS — M21.371 RIGHT FOOT DROP: Primary | ICD-10-CM

## 2021-05-06 DIAGNOSIS — G89.29 CHRONIC RIGHT-SIDED LOW BACK PAIN WITHOUT SCIATICA: ICD-10-CM

## 2021-05-06 PROCEDURE — 1123F ACP DISCUSS/DSCN MKR DOCD: CPT | Performed by: NURSE PRACTITIONER

## 2021-05-06 PROCEDURE — 4004F PT TOBACCO SCREEN RCVD TLK: CPT | Performed by: NURSE PRACTITIONER

## 2021-05-06 PROCEDURE — G8427 DOCREV CUR MEDS BY ELIG CLIN: HCPCS | Performed by: NURSE PRACTITIONER

## 2021-05-06 PROCEDURE — 99213 OFFICE O/P EST LOW 20 MIN: CPT | Performed by: NURSE PRACTITIONER

## 2021-05-06 PROCEDURE — G8420 CALC BMI NORM PARAMETERS: HCPCS | Performed by: NURSE PRACTITIONER

## 2021-05-06 PROCEDURE — 3017F COLORECTAL CA SCREEN DOC REV: CPT | Performed by: NURSE PRACTITIONER

## 2021-05-06 PROCEDURE — 4040F PNEUMOC VAC/ADMIN/RCVD: CPT | Performed by: NURSE PRACTITIONER

## 2021-05-06 NOTE — PROGRESS NOTES

## 2021-05-06 NOTE — PROGRESS NOTES
[facility] Office Note      Patient:   Aidan Russo MR#:    [id number]  Account Number:                         YOB: 1950  Date of Evaluation:  5/6/2021  Time of Note:                          4:19 PM  Primary/Referring Physician:  Chino Hernandez, LO - CNP   Consulting Physician:  Gerardo Anderson DNP, APRN    FOLLOW UP    Chief Complaint   Patient presents with    Follow-up     c /o foot drop     Results     MRI brain MRI Lspine     HISTORY OF PRESENT ILLNESS    Aidan Russo is a 66y.o. year old male here for follow up of gait changes, weakness, right foot drop. Largely unchanged from prior visit. Symptoms have been present for about 1.5- 2 years now. About 2 years ago his daughter reports that he fell down the stairs, she questions if symptoms started around that time. He initially noted imbalance, difficulty walking and that has slowly progressed. He notes lower back pain with radiation of pain into the right leg. Denies left leg pain. He doesn't have sensation from knees to his ankles. Denies incontinence. Denies saddle anesthesia. Denies bulbar symptoms. He uses a walker at home, wheelchair when out. Extensive work up completed, see below. He got AFO today. He is following with Dr. Alisa Joshi for back pain/leg pain, injections have been helpful. Has never seen neurosurgery. Questionable stroke history, had a brief period of right sided weakness involving the leg and arm. He is a diabetic. No vitamin B12 deficiency. He does have a history of prostate cancer with radiation treatments, no chemotherapy. He follow with urology and cardiology here. Past Medical History:   Diagnosis Date    Arthritis     Asthma     Cancer ([facility] [address] 08/2018    Prostrate.      Coronary artery disease 12/17/2018    Diabetes mellitus ([address]     Hypercholesterolemia 12/17/2018    Hypertension     Hypothyroidism 7/6/2018    Rheumatic fever     as child    Systolic congestive heart failure ([address] 12/17/2018       Past Surgical History:   Procedure Laterality Date    APPENDECTOMY      CHOLECYSTECTOMY      PANCREAS SURGERY      STENT PLACED IN BILE DUCT       Family History   Problem Relation Age of Onset    Cancer Mother     Tuberculosis Father        Social History     Socioeconomic History    Marital status:      Spouse name: Not on file    Number of children: Not on file    Years of education: Not on file    Highest education level: Not on file   Occupational History    Not on file   Social Needs    Financial resource strain: Not on file    Food insecurity     Worry: Not on file     Inability: Not on file    Transportation needs     Medical: Not on file     Non-medical: Not on file   Tobacco Use    Smoking status: Never Smoker    Smokeless tobacco: Current User     Types: Chew   Substance and Sexual Activity    Alcohol use: Yes     Comment: occ    Drug use: No    Sexual activity: Not on file   Lifestyle    Physical activity     Days per week: Not on file     Minutes per session: Not on file    Stress: Not on file   Relationships    Social connections     Talks on phone: Not on file     Gets together: Not on file     Attends Rastafarian service: Not on file     Active member of club or organization: Not on file     Attends meetings of clubs or organizations: Not on file     Relationship status: Not on file    Intimate partner violence     Fear of current or ex partner: Not on file     Emotionally abused: Not on file     Physically abused: Not on file     Forced sexual activity: Not on file   Other Topics Concern    Not on file   Social History Narrative    Not on file       Current Outpatient Medications   Medication Sig Dispense Refill    insulin NPH (NOVOLIN N) 100 UNIT/ML injection vial Novolin N NPH U-100 Insulin isophane 100 unit/mL subcutaneous susp      ondansetron (ZOFRAN-ODT) 4 MG disintegrating tablet ondansetron 4 mg disintegrating tablet   Place 1 tablet every 4-6 hours by translingual route as needed.  atorvastatin (LIPITOR) 80 MG tablet Take 1 tablet by mouth daily 90 tablet 0    doxyLAMINE succinate (SLEEP AID) 25 MG tablet Take 25 mg by mouth nightly      gabapentin (NEURONTIN) 100 MG capsule Take 1 capsule by mouth 3 times daily for 30 days. 90 capsule 0    pantoprazole (PROTONIX) 40 MG tablet       clopidogrel (PLAVIX) 75 MG tablet Take 1 tablet by mouth daily 30 tablet 5    aspirin EC 81 MG EC tablet Take 1 tablet by mouth daily 30 tablet 5    glipiZIDE (GLUCOTROL) 5 MG tablet Take 5 mg by mouth daily       lisinopril (PRINIVIL;ZESTRIL) 10 MG tablet lisinopril 10 mg tablet  DAILY       No current facility-administered medications for this visit. Allergies   Allergen Reactions    Phenergan [Promethazine Hcl]      REVIEW OF SYSTEMS  Constitutional: []? Fever []? Sweats []? Chills []? Recent Injury [x]? Denies all unless marked  HEENT:[]? Headache  []? Head Injury []? Hearing Loss  []? Sore Throat  []? Ear Ache [x]? Denies all unless marked  Spine:  []? Neck pain  []? Back pain  []? Sciaticia  [x]? Denies all unless marked  Cardiovascular:[]? Heart Disease []? Palpitations []? Chest Pain   [x]? Denies all unless marked  Pulmonary: []? Shortness of Breath []? Cough   [x]? Denies all unless marked  Psychiatric/Behavioral:[]? Depression []? Anxiety [x]? Denies all unless marked  Gastrointestinal: []? Nausea  []? Vomiting  []? Abdominal Pain  []? Constipation  []? Diarrhea  [x]? Denies all unless marked  Genitourinary:   []? Frequency  []? Urgency  []? Dysuria []? Incontinence  [x]? Denies all unless marked  Extremities: []? Pain  []? Swelling  [x]? Denies all unless marked  Musculoskeletal: []? Myalgias  []? Joint Pain  []? Arthritis []? Muscle Cramps []? Muscle Twitches  [x]? Denies all unless marked  Sleep: []? Insomnia[]? Snoring []? Restless Legs  []? Sleep Apnea  []? Daytime Sleepiness  [x]? Denies all unless marked  Skin:[]? Rash []? Color Change [x]?  Denies all unless marked   Neurological:[]? Visual Disturbance []? Memory Loss []? Loss of Balance []? Slurred Speech []? Weakness []? Seizures  [x]? Dizziness []? Denies all unless marked    The MA has completed the ROS with the patient. I have reviewed it in its' entirety with the patient and agree with the documentation. PHYSICAL EXAM  BP (!) 141/92   Pulse 94   Temp 97.1 °F (36.2 °C)   Ht 5' 10\" (1.778 m)   Wt 140 lb (63.5 kg)   SpO2 97%   BMI 20.09 kg/m²       Constitutional - No acute distress    HEENT- Conjunctiva normal.  No scars, masses, or lesions over external nose or ears, no neck masses noted, no jugular vein distension, no bruit  Cardiac- Regular rate and rhythm  Pulmonary- Good expansion, normal effort without use of accessory muscles  Musculoskeletal - No significant wasting of muscles noted, no bony deformities  Extremities - No clubbing, cyanosis or edema  Skin - Warm, dry, and intact. No rash, erythema, or pallor  Psychiatric - Mood, affect, and behavior appear normal      NEUROLOGICAL EXAM     Mental status   [x] Awake, alert, oriented   [x]Affect attention and concentration appear appropriate  [x]Recent and remote memory appears unremarkable  [x]Speech normal without dysarthria or aphasia, comprehension and repetition intact.    COMMENTS:    Cranial Nerves [x]No VF deficit to confrontation,  no papilledema on fundoscopic exam.  [x]PERRLA, EOMI, no nystagmus, conjugate eye movements, no ptosis  [x]Face symmetric  [x]Facial sensation intact  [x]Tongue midline no atrophy or fasciculations present  [x]Palate midline, hearing to finger rub normal bilaterally  [x]Shoulder shrug and SCM testing normal bilaterally  COMMENTS:   Motor   []5/5 strength x 4 extremities  []Normal bulk and tone  [x]No tremor present  [x]No rigidity or bradykinesia noted  COMMENTS: right foot drop, right EHL weakness, AFO in place; right calf atrophy; left foot 4-/5    Sensory  []Sensation intact to light touch, pin prick, vibration, and proprioception BLE  [x]Sensation intact to light touch, pin prick, vibration, and proprioception BUE  COMMENTS: decreased PP, vib BLE R>L   Coordination [x]FTN normal bilaterally   [x]HTS normal bilaterally  [x]SONDRA normal bilaterally. COMMENTS:    Reflexes  []Symmetric and non-pathological  [x]Toes down going bilaterally  [x]No clonus present  COMMENTS: absent right patellar/achilles; decreased left leg   Gait                  []Normal steady gait    []Ataxic    []Spastic     []Magnetic     []Shuffling  COMMENTS: in wheelchair, not tested        LABS RECORD AND IMAGING REVIEW (As below and per HPI)    Lab Results   Component Value Date    RNKNZBAV17 345 03/15/2021     Lab Results   Component Value Date    WBC 3.6 (L) 03/15/2021    HGB 13.0 (L) 03/15/2021    HCT 37.5 (L) 03/15/2021    MCV 88.4 03/15/2021     03/15/2021     Lab Results   Component Value Date     03/15/2021    K 3.6 03/15/2021     03/15/2021    CO2 26 03/15/2021    BUN 5 (L) 03/15/2021    CREATININE 0.6 03/15/2021    GLUCOSE 408 (H) 03/15/2021    CALCIUM 8.8 03/15/2021    PROT 5.7 (L) 03/15/2021    PROT 6.0 (L) 03/15/2021    LABALBU 3.42 (L) 03/15/2021    LABALBU 3.5 03/15/2021    BILITOT 0.5 03/15/2021    ALKPHOS 91 03/15/2021    AST 9 03/15/2021    ALT 15 03/15/2021    LABGLOM >60 03/15/2021    GFRAA >59 03/15/2021     Lab Results   Component Value Date    CHOL 133 (L) 2019    TRIG 81 2019    HDL 51 (L) 2019    LDLCALC 66 2019     Lab Results   Component Value Date    T4FREE 1.34 03/15/2021     Lab Results   Component Value Date    CRP 0.14 03/15/2021    SEDRATE 8 03/15/2021      A1C- 9.3     MRI brain (2021)- mild atrophy and      MRI lumbar spine (2021)-   Impression   Chronic degenerative changes, more severe at L5-S1. No evidence of an HNP or spinal stenosis. There is moderate narrowing   of the neural foramina at L5-S1 more severe on the left side.    The heterogeneous bone marrow signal may suggest a chronic bone marrow   disorder. Signed by Dr Partha Pham on 2021 2:34 PM     NCS/EMG (3/2021)- severe generalized sensory motor peripheral neuropathy and severe chronic right L5/S1 radiculopathies     Reviewed referral records     ASSESSMENT:    Dayo Zelaya is a 66y.o. year old male here for follow up of back pain, right foot drop, left leg/foot weakness and neuropathy. Largely unchanged from prior visit. On exam, he has right foot drop, EHL weakness, right calf atrophy. Left sided dorsiflexion is 4-/5. Reflexes are abnormal in BLE, absent/hypoactive. No clear UMN findings on exam, no bulbar symptoms. Recent NCS with severe sensorimotor peripheral neuropathy and L5/S1 radiculopathy. MRI brain with , atrophy. MRI lumbar spine with moderate NF narrowing at L5/S1, worse on the left side. This doesn't match well with his foot drop but will have neurosurgery review as well. A1C was 9.3 so diabetes is not well controlled, suspect diabetes related neuropathy however would not suspect asymmetric symptoms with this. If symptoms have been ongoing for 1-2 years now, unclear if he will regain any strength, discussed with patient and daughter. If weakness progresses, may need to consider repeat NCS/EMG. Discussed therapy today but patient's daughter reports that transportation will be an issue. Will further discuss case with Dr. Rachel Gambino as well. ICD-10-CM    1. Right foot drop  M21.371 LO Jamison, Neurosurgery, [place]   2. Chronic right-sided low back pain without sciatica  M54.5 LO Cameron, Neurosurgery, [place]    G65.61    3. Neuropathy  G62.9      PLAN:  1. Neurosurgery referral   2. Maximize treatment of diabetes through PCP  3. Fall precautions, use walker/wheelchair, continue AFO   4. Return in about 3 months (around 2021) for follow up, sooner if worsening.     Alexis Venegas DNP, APRN

## 2021-05-11 ENCOUNTER — TELEPHONE (OUTPATIENT)
Dept: NEUROSURGERY | Age: 71
End: 2021-05-11

## 2021-05-11 NOTE — TELEPHONE ENCOUNTER
[facility] New Patient Questionnaire    1. Diagnosis/Reason for Referral?    Foot Drop, low back pain    2. Who is completing questionnaire? Patient x Caregiver Family      3. Has the patient had any previous spinal/brain surgeries?no        A. If yes, what is the name of the facility in which the surgery was performed? B. Procedure/Surgery performed? C. Who was the surgeon? D. When was the surgery? MM/YY       E. Did the patient improve after the surgery? 4. Is this a second opinion? If yes, Dr. Sofia Owens would like to review patient first before making the appointment. 5. Have MRI Images been obtain within the last year? Yes x No      XR  CT     If yes, where was the imaging performed? [facility]     If yes, what part of the body? Lumbar x Cervical  Thoracic  Brainx     If yes, when was it obtained?      4-9-21  Note: if the scan was performed at a facility other than [facility], the disc will need to be brought to the appointment or we need to reach out to obtain the disc. A. Was the patient instructed to provide the disc? Yes   [facility]      8. Has the patient had a NCV/EMG within the last year? Yes x No     If yes, where was it performed and date?      3-12-21  Location:[facility]      9. Has the patient been to Physical Therapy? Not able to walk , no feeling in right foot, spouse is disabled as well, did home health once and was told there was nothing they could do and never came back. Yes  [facility]      If yes, what location, how long attended, and last visit? Location:        Therapy Lasted:    Date of Last Visit:      10. Has the patient been to Pain Management? Yes x No     If yes, what location and last visit     Location: [facility]   Last Visit:4-16-21   Is it helping?  NO

## 2021-05-21 ENCOUNTER — OFFICE VISIT (OUTPATIENT)
Dept: NEUROSURGERY | Age: 71
End: 2021-05-21
Payer: MEDICARE

## 2021-05-21 VITALS
BODY MASS INDEX: 19.33 KG/M2 | HEIGHT: 70 IN | DIASTOLIC BLOOD PRESSURE: 92 MMHG | OXYGEN SATURATION: 97 % | HEART RATE: 94 BPM | SYSTOLIC BLOOD PRESSURE: 143 MMHG | WEIGHT: 135 LBS

## 2021-05-21 DIAGNOSIS — M54.42 CHRONIC MIDLINE LOW BACK PAIN WITH BILATERAL SCIATICA: ICD-10-CM

## 2021-05-21 DIAGNOSIS — R29.898 RIGHT LEG WEAKNESS: ICD-10-CM

## 2021-05-21 DIAGNOSIS — M48.061 LUMBAR FORAMINAL STENOSIS: ICD-10-CM

## 2021-05-21 DIAGNOSIS — M54.41 CHRONIC MIDLINE LOW BACK PAIN WITH BILATERAL SCIATICA: ICD-10-CM

## 2021-05-21 DIAGNOSIS — R29.6 FREQUENT FALLS: ICD-10-CM

## 2021-05-21 DIAGNOSIS — M21.371 RIGHT FOOT DROP: ICD-10-CM

## 2021-05-21 DIAGNOSIS — R20.0 BILATERAL HAND NUMBNESS: Primary | ICD-10-CM

## 2021-05-21 DIAGNOSIS — R29.818 FINE MOTOR IMPAIRMENT: ICD-10-CM

## 2021-05-21 DIAGNOSIS — R26.81 UNSTABLE GAIT: ICD-10-CM

## 2021-05-21 DIAGNOSIS — M51.36 DDD (DEGENERATIVE DISC DISEASE), LUMBAR: ICD-10-CM

## 2021-05-21 DIAGNOSIS — R29.898 FINE MOTOR IMPAIRMENT: ICD-10-CM

## 2021-05-21 DIAGNOSIS — R29.898 BILATERAL ARM WEAKNESS: ICD-10-CM

## 2021-05-21 DIAGNOSIS — G89.29 CHRONIC MIDLINE LOW BACK PAIN WITH BILATERAL SCIATICA: ICD-10-CM

## 2021-05-21 PROCEDURE — 4040F PNEUMOC VAC/ADMIN/RCVD: CPT | Performed by: NURSE PRACTITIONER

## 2021-05-21 PROCEDURE — 99204 OFFICE O/P NEW MOD 45 MIN: CPT | Performed by: NURSE PRACTITIONER

## 2021-05-21 PROCEDURE — 3017F COLORECTAL CA SCREEN DOC REV: CPT | Performed by: NURSE PRACTITIONER

## 2021-05-21 PROCEDURE — 1123F ACP DISCUSS/DSCN MKR DOCD: CPT | Performed by: NURSE PRACTITIONER

## 2021-05-21 PROCEDURE — 4004F PT TOBACCO SCREEN RCVD TLK: CPT | Performed by: NURSE PRACTITIONER

## 2021-05-21 PROCEDURE — G8427 DOCREV CUR MEDS BY ELIG CLIN: HCPCS | Performed by: NURSE PRACTITIONER

## 2021-05-21 PROCEDURE — G8420 CALC BMI NORM PARAMETERS: HCPCS | Performed by: NURSE PRACTITIONER

## 2021-05-21 ASSESSMENT — ENCOUNTER SYMPTOMS
BACK PAIN: 1
GASTROINTESTINAL NEGATIVE: 1
EYES NEGATIVE: 1
RESPIRATORY NEGATIVE: 1

## 2021-05-21 NOTE — PROGRESS NOTES
[facility]  Office Visit      Chief Complaint   Patient presents with    Referral - General    Back Pain    Leg Pain    Numbness    Foot Problem       HISTORY OF PRESENT ILLNESS:    Leslie Watkins is a 66 y.o. male with a history of prostate cancer and DM who states he fell down the basement steps about 1.5 years ago due to having an balance problem. There was a question on him having a stroke at that time due to him having some right sided numbness. He is very unsteady that has been ongoing for about 2 years. NCS/EMG performed on 3/12/2021 was completed and revealed severe generalized polyneuropathy. The majority of his pain is located in the very low back. He will occasionally have pain that radiates into the RLE pain. His pain is mostly located in the low back. The patient complains of numbness of the bilateral 5th digits that has been ongoing for about 6 months. He does have numbness in the fingertips, trouble using hands to perform fine motor tasks and ataxia. He has trouble holding on to his eating utensils. He does admit to some neck pain and BUE pain. The patient states that he can no longer do any form of physical activity or walk which has dramatically affected his quality of life. The patient has underwent a non-operative treatment course that has included:  Opiates (Norco)  Epidural Steroid Injections ([facility])      Of note he does not use tobacco and does take blood thinning medications (ASA and Plavix). Past Medical History:   Diagnosis Date    Arthritis     Asthma     Cancer ([address] 08/2018    Prostrate.      Coronary artery disease 12/17/2018    Diabetes mellitus ([address]     Hypercholesterolemia 12/17/2018    Hypertension     Hypothyroidism 7/6/2018    Rheumatic fever     as child    Systolic congestive heart failure ([address] 12/17/2018       Past Surgical History:   Procedure Laterality Date    APPENDECTOMY      CHOLECYSTECTOMY      PANCREAS SURGERY      STENT PLACED IN BILE DUCT       Current Outpatient Medications   Medication Sig Dispense Refill    insulin NPH (NOVOLIN N) 100 UNIT/ML injection vial Novolin N NPH U-100 Insulin isophane 100 unit/mL subcutaneous susp      ondansetron (ZOFRAN-ODT) 4 MG disintegrating tablet ondansetron 4 mg disintegrating tablet   Place 1 tablet every 4-6 hours by translingual route as needed.  atorvastatin (LIPITOR) 80 MG tablet Take 1 tablet by mouth daily 90 tablet 0    doxyLAMINE succinate (SLEEP AID) 25 MG tablet Take 25 mg by mouth nightly      gabapentin (NEURONTIN) 100 MG capsule Take 1 capsule by mouth 3 times daily for 30 days. 90 capsule 0    pantoprazole (PROTONIX) 40 MG tablet       clopidogrel (PLAVIX) 75 MG tablet Take 1 tablet by mouth daily 30 tablet 5    aspirin EC 81 MG EC tablet Take 1 tablet by mouth daily 30 tablet 5    glipiZIDE (GLUCOTROL) 5 MG tablet Take 5 mg by mouth daily       lisinopril (PRINIVIL;ZESTRIL) 10 MG tablet lisinopril 10 mg tablet  DAILY       No current facility-administered medications for this visit. Allergies:  Phenergan [promethazine hcl]    Social History:   Social History     Tobacco Use   Smoking Status Never Smoker   Smokeless Tobacco Current User    Types: Chew     Social History     Substance and Sexual Activity   Alcohol Use Yes    Comment: occ         Family History:   Family History   Problem Relation Age of Onset    Cancer Mother     Tuberculosis Father        REVIEW OF SYSTEMS:  Constitutional: Negative. HENT: Negative. Eyes: Negative. Respiratory: Negative. Cardiovascular: Negative. Gastrointestinal: Negative. Genitourinary: Negative. Musculoskeletal: Positive for back pain and myalgias. Skin: Negative. Neurological: Positive for tingling and weakness. Endo/Heme/Allergies: Negative. Psychiatric/Behavioral: Negative.       PHYSICAL EXAM:  Vitals:    05/21/21 1344   BP: (!) 143/92   Pulse: 94   SpO2: 97% desiccation/dehydration. There is a heterogeneous bone marrow signal is may represent bone   matter disorder. L1-2: Mild disc bulging with a small central annular tear. There is   bilateral facet arthropathy. The neural foramina and spinal canal are   patent. L2-3: Small posterior osteophytes and mild disc bulging. There is   bilateral facetal arthropathy. The neural foramina and spinal canal   are patent. L3-4: Small posterior osteophytes and a mild diffuse disc bulging. There is bilateral facetal arthropathy. The neural foramina and spinal   canal are patent. L4-5: Mild diffuse disc bulging. There is bilateral facet arthropathy. The neural foramina and spinal canal are patent. L5-S1: There are small posterior osteophytes, asymmetrically more   pronounced towards left. There is bilateral facet arthropathy. There   is moderate bilateral neural foraminal stenosis, more severe on the   left side. No spinal stenosis. The size and signal of the conus medullaris and the limited visualized   thoracic spinal cord appear normal. No focal enlargement or expansion. No abnormal enhancement. The cauda equina appear normal.   There is no abnormal enhancement in the vertebral bodies,   intervertebral discs, posterior elements aren't the soft tissues.       Impression   Chronic degenerative changes, more severe at L5-S1. No evidence of an HNP or spinal stenosis. There is moderate narrowing   of the neural foramina at L5-S1 more severe on the left side. The heterogeneous bone marrow signal may suggest a chronic bone marrow   disorder. Signed by Dr Alba Vega on 4/9/2021 2:34 PM   I have personally reviewed these images and my interpretation is: There is DDD throughout  L5-S1 there is some lateral recess and foraminal stenosis L>R, however, no clear compression of the nerve root. ASSESSMENT:    Augusta Morrow is a 66 y.o. male with RIGHT sided foot drop.         ICD-10-CM    1. Bilateral hand numbness  R20.0 MRI CERVICAL SPINE WO CONTRAST   2. Fine motor impairment  R29.818 MRI CERVICAL SPINE WO CONTRAST    R29.898    3. Frequent falls  R29.6 MRI CERVICAL SPINE WO CONTRAST   4. Unstable gait  R26.81 MRI CERVICAL SPINE WO CONTRAST   5. Right foot drop  M21.371 MRI CERVICAL SPINE WO CONTRAST   6. Bilateral arm weakness  R29.898 MRI CERVICAL SPINE WO CONTRAST   7. Right leg weakness  R29.898 MRI CERVICAL SPINE WO CONTRAST   8. DDD (degenerative disc disease), lumbar  M51.36    9. Lumbar foraminal stenosis  M48.061    10. Chronic midline low back pain with bilateral sciatica  M54.41     M54.42     G89.29        PLAN:  We have discussed and reviewed the results of the MRI brain and lumbar spine with Mr. Merle Coley and his daughter at length. We explained that he does have some narrowing within the lumbar spine, however, the narrowing is not severe enough nor does it explain the severity of his foot weakness. There is question of cervical stenosis given the hand numbness, loss of dexterity.  At this point we have explained that the foot weakness has been present for so long that it is likely permanent.    -Obtain MRI cervical spine  -Follow up after imaging         LO Ruelas

## 2021-06-04 ENCOUNTER — HOSPITAL ENCOUNTER (OUTPATIENT)
Dept: MRI IMAGING | Age: 71
Discharge: HOME | End: 2021-06-04
Payer: MEDICARE

## 2021-06-04 DIAGNOSIS — R26.81 UNSTABLE GAIT: ICD-10-CM

## 2021-06-04 DIAGNOSIS — R29.818 FINE MOTOR IMPAIRMENT: ICD-10-CM

## 2021-06-04 DIAGNOSIS — R20.0 BILATERAL HAND NUMBNESS: ICD-10-CM

## 2021-06-04 DIAGNOSIS — R29.898 FINE MOTOR IMPAIRMENT: ICD-10-CM

## 2021-06-04 DIAGNOSIS — R29.898 RIGHT LEG WEAKNESS: ICD-10-CM

## 2021-06-04 DIAGNOSIS — R29.6 FREQUENT FALLS: ICD-10-CM

## 2021-06-04 DIAGNOSIS — M21.371 RIGHT FOOT DROP: ICD-10-CM

## 2021-06-04 DIAGNOSIS — R29.898 BILATERAL ARM WEAKNESS: ICD-10-CM

## 2021-06-04 PROCEDURE — 72141 MRI NECK SPINE W/O DYE: CPT

## 2021-06-10 ENCOUNTER — OFFICE VISIT (OUTPATIENT)
Dept: NEUROSURGERY | Age: 71
End: 2021-06-10
Payer: MEDICARE

## 2021-06-10 VITALS
SYSTOLIC BLOOD PRESSURE: 148 MMHG | HEART RATE: 89 BPM | WEIGHT: 137 LBS | HEIGHT: 70 IN | DIASTOLIC BLOOD PRESSURE: 79 MMHG | BODY MASS INDEX: 19.61 KG/M2

## 2021-06-10 DIAGNOSIS — M48.02 CERVICAL STENOSIS OF SPINAL CANAL: Primary | ICD-10-CM

## 2021-06-10 DIAGNOSIS — R29.6 FREQUENT FALLS: ICD-10-CM

## 2021-06-10 DIAGNOSIS — M21.371 RIGHT FOOT DROP: ICD-10-CM

## 2021-06-10 DIAGNOSIS — R29.898 FINE MOTOR IMPAIRMENT: ICD-10-CM

## 2021-06-10 DIAGNOSIS — R60.0 BILATERAL LOWER EXTREMITY EDEMA: ICD-10-CM

## 2021-06-10 DIAGNOSIS — R20.0 BILATERAL HAND NUMBNESS: ICD-10-CM

## 2021-06-10 DIAGNOSIS — R29.818 FINE MOTOR IMPAIRMENT: ICD-10-CM

## 2021-06-10 DIAGNOSIS — R26.81 UNSTABLE GAIT: ICD-10-CM

## 2021-06-10 DIAGNOSIS — R09.89 WEAK PULSE: ICD-10-CM

## 2021-06-10 DIAGNOSIS — M48.02 FORAMINAL STENOSIS OF CERVICAL REGION: ICD-10-CM

## 2021-06-10 DIAGNOSIS — L81.9 DISCOLORATION OF SKIN OF LOWER LEG: ICD-10-CM

## 2021-06-10 PROCEDURE — G8427 DOCREV CUR MEDS BY ELIG CLIN: HCPCS | Performed by: NEUROLOGICAL SURGERY

## 2021-06-10 PROCEDURE — 1123F ACP DISCUSS/DSCN MKR DOCD: CPT | Performed by: NEUROLOGICAL SURGERY

## 2021-06-10 PROCEDURE — 3017F COLORECTAL CA SCREEN DOC REV: CPT | Performed by: NEUROLOGICAL SURGERY

## 2021-06-10 PROCEDURE — 99214 OFFICE O/P EST MOD 30 MIN: CPT | Performed by: NEUROLOGICAL SURGERY

## 2021-06-10 PROCEDURE — 4040F PNEUMOC VAC/ADMIN/RCVD: CPT | Performed by: NEUROLOGICAL SURGERY

## 2021-06-10 PROCEDURE — G8420 CALC BMI NORM PARAMETERS: HCPCS | Performed by: NEUROLOGICAL SURGERY

## 2021-06-10 PROCEDURE — 4004F PT TOBACCO SCREEN RCVD TLK: CPT | Performed by: NEUROLOGICAL SURGERY

## 2021-06-10 ASSESSMENT — ENCOUNTER SYMPTOMS
EYES NEGATIVE: 1
GASTROINTESTINAL NEGATIVE: 1
RESPIRATORY NEGATIVE: 1

## 2021-06-10 NOTE — PROGRESS NOTES
Review of Systems   Constitutional: Negative. HENT: Negative. Eyes: Negative. Respiratory: Negative. Cardiovascular: Negative. Gastrointestinal: Negative. Genitourinary: Positive for urgency. Skin: Negative. Neurological: Positive for dizziness. Endo/Heme/Allergies: Bruises/bleeds easily. Psychiatric/Behavioral: Positive for memory loss. The patient has insomnia.

## 2021-06-10 NOTE — PROGRESS NOTES
[facility]  Office Visit      Chief Complaint   Patient presents with    Follow-up     review MRI, bilateral hand numbness     6/10/2021: He has bilateral hand numbness of the last 4th and 5th digits. He is dropping objects often. He does have pain in the bilateral arms L>R. HISTORY OF PRESENT ILLNESS:    Eduardo Cheek is a 66 y.o. male with a history of prostate cancer and DM who states he fell down the basement steps about 1.5 years ago due to having an balance problem. There was a question on him having a stroke at that time due to him having some right sided numbness. He is very unsteady that has been ongoing for about 2 years. NCS/EMG performed on 3/12/2021 was completed and revealed severe generalized polyneuropathy. The majority of his pain is located in the very low back. He will occasionally have pain that radiates into the RLE pain. His pain is mostly located in the low back. The patient complains of numbness of the bilateral 5th digits that has been ongoing for about 6 months. He does have numbness in the fingertips, trouble using hands to perform fine motor tasks and ataxia. He has trouble holding on to his eating utensils. He does admit to some neck pain and BUE pain. The patient states that he can no longer do any form of physical activity or walk which has dramatically affected his quality of life. The patient has underwent a non-operative treatment course that has included:  Opiates (Norco)  Epidural Steroid Injections (OIWK)      Of note he does not use tobacco and does take blood thinning medications (ASA and Plavix). Past Medical History:   Diagnosis Date    Arthritis     Asthma     Cancer ([facility] [address] 08/2018    Prostrate.      Coronary artery disease 12/17/2018    Diabetes mellitus ([facility] [address]     Hypercholesterolemia 12/17/2018    Hypertension     Hypothyroidism 7/6/2018    Rheumatic fever     as child    Systolic congestive heart failure GLUCOSE 408 (H) 03/15/2021    CALCIUM 8.8 03/15/2021    PROT 5.7 (L) 03/15/2021    PROT 6.0 (L) 03/15/2021    LABALBU 3.42 (L) 03/15/2021    LABALBU 3.5 03/15/2021    BILITOT 0.5 03/15/2021    ALKPHOS 91 03/15/2021    AST 9 03/15/2021    ALT 15 03/15/2021    LABGLOM >60 03/15/2021    GFRAA >59 03/15/2021   No results found for: INR, PROTIME    Narrative   EXAM: MR BRAIN WITHOUT AND WITH IV CONTRAST on 4/9/2021 11:52 AM   COMPARISON: None    HISTORY: 66years-old Male. M21.371   TECHNIQUE:    Routine pulse sequences were obtained of the brain before and after   the administration of IV contrast.    REPORT:    Mild generalized cerebral volume loss. Mild chronic microvascular   changes. There is no evidence of mass-effect or midline shift. No   reduced diffusivity is demonstrated. No abnormally enhancing lesions   are identified. The brainstem, sella, pituitary, cerebellum are   unremarkable. The basal cisterns are preserved. No acute osseous lesion. The intraorbital structures are unremarkable. The flow voids are preserved. Dural sinuses are patent.     Mastoid air cells are clear. Paranasal sinuses are free of   obstructive mucosal disease.        Impression   1.  No acute intracranial abnormalities. 2.  Mild generalized cerebral and loss and mild chronic microvascular   changes   Signed by Dr Lisa Michael on 4/9/2021 3:20 PM   I have personally reviewed the images and my interpretation is:  No acute intracranial abnormalities           Narrative   Examination. MRI LUMBAR SPINE W WO CONTRAST 4/9/2021 12:30 PM   History: Right foot drop. The multiplanar, multisequence MR imaging of the lumbar spine is   performed before and after intravenous contrast enhancement. There is no previous study for comparison. There is normal curve and alignment. The vertebral body heights are normal.   The loss of height and signal of intervertebral disc at L5-S1. A   transitional vertebra S1 is noted.    There is also signal of the intervertebral disc of the entire lumbar   spine suggesting desiccation/dehydration. There is a heterogeneous bone marrow signal is may represent bone   matter disorder. L1-2: Mild disc bulging with a small central annular tear. There is   bilateral facet arthropathy. The neural foramina and spinal canal are   patent. L2-3: Small posterior osteophytes and mild disc bulging. There is   bilateral facetal arthropathy. The neural foramina and spinal canal   are patent. L3-4: Small posterior osteophytes and a mild diffuse disc bulging. There is bilateral facetal arthropathy. The neural foramina and spinal   canal are patent. L4-5: Mild diffuse disc bulging. There is bilateral facet arthropathy. The neural foramina and spinal canal are patent. L5-S1: There are small posterior osteophytes, asymmetrically more   pronounced towards left. There is bilateral facet arthropathy. There   is moderate bilateral neural foraminal stenosis, more severe on the   left side. No spinal stenosis. The size and signal of the conus medullaris and the limited visualized   thoracic spinal cord appear normal. No focal enlargement or expansion. No abnormal enhancement. The cauda equina appear normal.   There is no abnormal enhancement in the vertebral bodies,   intervertebral discs, posterior elements aren't the soft tissues.       Impression   Chronic degenerative changes, more severe at L5-S1. No evidence of an HNP or spinal stenosis. There is moderate narrowing   of the neural foramina at L5-S1 more severe on the left side. The heterogeneous bone marrow signal may suggest a chronic bone marrow   disorder. Signed by Dr Radha Hernandez on 4/9/2021 2:34 PM   I have personally reviewed these images and my interpretation is: There is DDD throughout  L5-S1 there is some lateral recess and foraminal stenosis L>R, however, no clear compression of the nerve root.           EXAM: MRI CERVICAL SPINE WO CONTRAST -- 6/4/2021 10:19 AM   HISTORY: 71 years, Male, R20.0, bilateral hand numbness, fine motor   impairment, frequent falls, unstable gait, right-sided jaw, bilateral   arm weakness, right leg weakness   COMPARISON: No existing relevant imaging studies available   TECHNIQUE:  Routine pulse sequences were obtained without intravenous   contrast.   FINDINGS: C1 through mid T2 visualized. 7 cervical vertebral bodies. Normal alignment. Edema-type signal in C3-4 endplates with severe disc   height loss. No fluid in the disc space. Cord signal normal.   Craniocervical junction within normal limits. Moderate diffuse disc   height loss. C2-C3:  Disc osteophyte complex. No spinal canal stenosis. No   foraminal stenosis. C3-C4:  Disc osteophyte complex. Bilateral facet hypertrophy. Moderate   spinal canal stenosis. Moderate to severe right and severe left   foraminal stenosis. C4-C5:  Disc osteophyte complex. No spinal canal stenosis. Severe   right and mild to moderate left foraminal stenosis. C5-C6:  Disc osteophyte complex. Severe spinal canal stenosis. Severe   bilateral foraminal stenosis. C6-C7:  Disc osteophyte complex. Mild spinal canal stenosis. Moderate   right and mild-to-moderate left foraminal stenosis. C7-T1:  No evidence of disc bulge, spinal canal or foraminal stenosis. Extraspinal soft tissues are within normal limits.       Impression   1. Greatest spinal canal stenosis appears severe at C5-6. Other   multilevel findings as above. 2. Edema-type signal in the C3-4 endplates with severe disc height   loss, favored to be degenerative. No fluid in the disc space. Signed by Dr Rufina Viveros on 6/4/2021 11:55 AM     I have personally reviewed the images and my interpretation is: There is DDD throughout.   C3-4 severe left, moderate to severe right foraminal stenosis, moderate canal stenosis, 8 mm  C4-5 severe bilateral foraminal stenosis  C5-6 severe bilateral foraminal stenosis, moderate to severe,

## 2021-06-17 ENCOUNTER — HOSPITAL ENCOUNTER (OUTPATIENT)
Dept: VASCULAR LAB | Age: 71
Discharge: HOME | End: 2021-06-17
Payer: MEDICARE

## 2021-06-17 ENCOUNTER — OFFICE VISIT (OUTPATIENT)
Dept: VASCULAR SURGERY | Age: 71
End: 2021-06-17
Payer: MEDICARE

## 2021-06-17 VITALS
DIASTOLIC BLOOD PRESSURE: 93 MMHG | OXYGEN SATURATION: 99 % | SYSTOLIC BLOOD PRESSURE: 146 MMHG | HEART RATE: 96 BPM | TEMPERATURE: 98.5 F

## 2021-06-17 DIAGNOSIS — R09.89 DECREASED PULSES IN FEET: Primary | ICD-10-CM

## 2021-06-17 DIAGNOSIS — R09.89 DECREASED PULSES IN FEET: ICD-10-CM

## 2021-06-17 PROCEDURE — 4040F PNEUMOC VAC/ADMIN/RCVD: CPT | Performed by: PHYSICIAN ASSISTANT

## 2021-06-17 PROCEDURE — 93923 UPR/LXTR ART STDY 3+ LVLS: CPT

## 2021-06-17 PROCEDURE — 3017F COLORECTAL CA SCREEN DOC REV: CPT | Performed by: PHYSICIAN ASSISTANT

## 2021-06-17 PROCEDURE — 99204 OFFICE O/P NEW MOD 45 MIN: CPT | Performed by: PHYSICIAN ASSISTANT

## 2021-06-17 PROCEDURE — 1123F ACP DISCUSS/DSCN MKR DOCD: CPT | Performed by: PHYSICIAN ASSISTANT

## 2021-06-17 PROCEDURE — 4004F PT TOBACCO SCREEN RCVD TLK: CPT | Performed by: PHYSICIAN ASSISTANT

## 2021-06-17 PROCEDURE — G8427 DOCREV CUR MEDS BY ELIG CLIN: HCPCS | Performed by: PHYSICIAN ASSISTANT

## 2021-06-17 PROCEDURE — G8420 CALC BMI NORM PARAMETERS: HCPCS | Performed by: PHYSICIAN ASSISTANT

## 2021-06-17 RX ORDER — HYDROCODONE BITARTRATE AND ACETAMINOPHEN 7.5; 325 MG/1; MG/1
1 TABLET ORAL EVERY 6 HOURS PRN
COMMUNITY
End: 2021-01-01

## 2021-06-17 NOTE — PROGRESS NOTES
Patient Care Team:  LO Pearce - CNP as PCP - General  Gerald Coronado MD as Consulting Physician (Interventional Cardiology)  Reina Sacks, APRN as Advanced Practice Nurse (Neurology)  Annabel Arenas DO as Consulting Physician (Neurosurgery)  Meredith Sotelo MD as Consulting Physician (Vascular Surgery)      History and Physical  Mr. Mustapha Coffey is a 76-year-old male who has a past medical history that includes hypertension, hyperlipidemia, coronary artery disease, systolic congestive heart failure, asthma, prostate cancer with radiation treatment, diabetes with polyneuropathy, CBP with radiculopathy. He reports weakness in his lower extremities as well as balance issues. He reports that he has gotten shots from pain management in the past for his back. He reports foot drop. He has mild LE swelling and skin discoloration of LE. He is on aspirin, Plavix and a statin daily. Old records have been obtained, reviewed, and summarized. Jamie Rodriguez is a 66 y.o. male with the following history reviewed and [facility] in [facility]:  Patient Active Problem List    Diagnosis Date Noted    Blurred vision, right eye 03/28/2019     Priority: High    Ischemic cardiomyopathy 01/17/2019     Priority: High    Coronary artery disease 12/17/2018     Priority: High    Hypercholesterolemia 12/17/2018     Priority: High    Systolic congestive heart failure ([facility] [address] 12/17/2018     Priority: High    Abnormal nuclear stress test 11/26/2018     Priority: High    Diabetes mellitus ([facility] [address] 12/20/2018    Family history of cancer 12/20/2018    Essential hypertension 09/24/2018    ESPINOSA (dyspnea on exertion) 09/24/2018    History of diabetes mellitus 68/64/8647    Diastolic dysfunction 84/46/3596    Hypothyroidism 07/06/2018     Current Outpatient Medications   Medication Sig Dispense Refill    HYDROcodone-acetaminophen (NORCO) 7.5-325 MG per tablet Take 1 tablet by mouth every 6 hours as needed for Pain.  insulin NPH (NOVOLIN N) 100 UNIT/ML injection vial Novolin N NPH U-100 Insulin isophane 100 unit/mL subcutaneous susp      ondansetron (ZOFRAN-ODT) 4 MG disintegrating tablet ondansetron 4 mg disintegrating tablet   Place 1 tablet every 4-6 hours by translingual route as needed.  atorvastatin (LIPITOR) 80 MG tablet Take 1 tablet by mouth daily 90 tablet 0    doxyLAMINE succinate (SLEEP AID) 25 MG tablet Take 25 mg by mouth nightly      gabapentin (NEURONTIN) 100 MG capsule Take 1 capsule by mouth 3 times daily for 30 days. 90 capsule 0    clopidogrel (PLAVIX) 75 MG tablet Take 1 tablet by mouth daily 30 tablet 5    aspirin EC 81 MG EC tablet Take 1 tablet by mouth daily 30 tablet 5    glipiZIDE (GLUCOTROL) 5 MG tablet Take 5 mg by mouth daily       lisinopril (PRINIVIL;ZESTRIL) 10 MG tablet lisinopril 10 mg tablet  DAILY      pantoprazole (PROTONIX) 40 MG tablet  (Patient not taking: Reported on 6/17/2021)       No current facility-administered medications for this visit. Allergies: Phenergan [promethazine hcl]  Past Medical History:   Diagnosis Date    Arthritis     Asthma     Cancer ([facility] [address] 08/2018    Prostrate.      Coronary artery disease 12/17/2018    Diabetes mellitus ([facility] [address]     Hypercholesterolemia 12/17/2018    Hypertension     Hypothyroidism 7/6/2018    Rheumatic fever     as child    Systolic congestive heart failure ([facility] [address] 12/17/2018     Past Surgical History:   Procedure Laterality Date    APPENDECTOMY      CHOLECYSTECTOMY      PANCREAS SURGERY      STENT PLACED IN BILE DUCT     Family History   Problem Relation Age of Onset    Cancer Mother     Tuberculosis Father      Social History     Tobacco Use    Smoking status: Never Smoker    Smokeless tobacco: Current User     Types: Chew   Substance Use Topics    Alcohol use: Yes     Comment: occ     Review of Systems    Constitutional -   No fever or chills   HENT - no HA's, tinnitus, rhinorrhea, sore throat  Eyes - no sudden vision change or amaurosis. Respiratory - SOB or chest pain  Cardiovascular - no chest pain, syncope, or significant dizziness. No palpitations   Gastrointestinal - no significant abdominal pain. No blood in stool. No diarrhea, nausea, or vomiting. Genitourinary - No difficulty urinating, dysuria, frequency, or urgency. No flank pain or hematuria. Musculoskeletal - no back pain or myalgia. Skin - no rashes or wounds   Neurologic - no dizziness, facial asymmetry, or light headedness. No seizures. No new onset of partial or complete loss of vision affecting only one eye, speech difficulty or lateralizing weakness, numbness/tingling   Hematologic - no excessive bleeding. Psychiatric - no severe anxiety or nervousness. No confusion. All other review of systems are negative. Physical Exam    BP (!) 146/93 (Site: Left Upper Arm)   Pulse 96   Temp 98.5 °F (36.9 °C)   SpO2 99%     Constitutional - No acute distress. HENT - head normocephalic. Hearing is intact   Eyes - conjunctiva normal.  EOMS normal.  No exudate. No icterus. Neck- ROM appears normal, no tracheal deviation. Cardiovascular - Regular rate and rhythm. Heart sounds are normal.  No murmur, rub, or gallop. Carotid pulses bilaterally without bruit. Extremities - Radial and ulnar pulses are 2+ to palpation bilaterally. No cyanosis, clubbing, or significant edema. No signs atheroembolic event. Pulmonary - effort appears normal.  No respiratory distress. Lungs - Breath sounds normal.   GI - Abdomen - No distension or palpable mass. Genitourinary - deferred. Musculoskeletal - ROM appears normal.  No significant edema. Neurologic - alert and oriented X 3. Face symmetric. No lateralizing weakness noted. Skin - warm, dry, and intact. No rash, erythema, or pallor.   Psychiatric - mood, affect, and behavior appear normal.  Judgment and thought processes appear normal.      Risk factors for atherosclerosis of all vascular beds have been reviewed with the patient including:  Family history, tobacco abuse in all forms, elevated cholesterol, hyperlipidemia, and diabetes. Lower extremity arterial study:         Impression        Based on ankle brachial indices and doppler waveforms, the patient has    mildly diminished flow to the bilateral lower extremity arterial system at   [facility] on segmental pressures and doppler waveforms, the patient likely has    disease in the bilateral superficial femoral arterial segments.        Signature        ----------------------------------------------------------------    Electronically signed by Eleonora Stevens MD(Interpreting    physician) on 06/17/2021 04:06 PM    ----------------------------------------------------------------       Velocities are measured in cm/s ; Diameters are measured in mm       Pressures   +--------------------------------------++--------+-----+----+--------+-----+   !                                      ! ! Right   !     !Left!        !     !   +--------------------------------------++--------+-----+----+--------+-----+   ! Location                              ! ! Pressure! Ratio!    !Pressure! Ratio! +--------------------------------------++--------+-----+----+--------+-----+   ! Upper Thigh                           !!189     !1.22 !    !200     !1.29 !   +--------------------------------------++--------+-----+----+--------+-----+   ! Lower Thigh                           !!188     !1.21 !    !196     !1.26 !   +--------------------------------------++--------+-----+----+--------+-----+   ! Calf                                  !!180     !1.16 !    !158     !1.18 !   +--------------------------------------++--------+-----+----+--------+-----+   ! Ankle PT                              !!144     !0.93 !    !189     !1.22 ! +--------------------------------------++--------+-----+----+--------+-----+   ! DP                                    !!138     !0.89 !    !174     !1.12 ! +--------------------------------------++--------+-----+----+--------+-----+   ! Great Toe                             !!100     !0.65 !    !149     !0.96 !   +--------------------------------------++--------+-----+----+--------+-----+         - Brachial Pressure:Right: 149. Left:155.         - ASHLIE:Right: 0.93. Left: 1.22.       Plethysmographic Digit Evaluation   +---------++--------+-----+---------------++--------+-----+----------------+   !         ! ! Right   !     ! Left           !!        !     !                !   +---------++--------+-----+---------------++--------+-----+----------------+   ! Location ! !Pressure! Ratio! PPG Wave Form  !!Pressure! Ratio! PPG Wave Form   !   +---------++--------+-----+---------------++--------+-----+----------------+   ! Great Toe!!100     !0.65 !               !!149     !0.96 !                !   +---------++--------+-----+---------------++--------+-----+----------------+     Individual films reviewed: Yes. Test results were reviewed with the patient. Assessment      1. Decreased pulses in feet    2. PVD  3. BLE swelling and discoloration        Plan      He has degenerative disc disease with chronic back pain with radiculopathy and right foot drop,  Polyneuropathy. He has mild PVD. Lower extreme swelling and discoloration most likely from venous insufficiency. At this time we do not recommend any intervention for his mild peripheral vascular disease. We did recommend mild compression stockings to see if this will help alleviate the swelling. I spoke with his daughter Brooklyn Paulino on the phone regarding his lower extremity arterial study recommendations. She verbalized understanding.   We will follow-up in 12 months with a repeat lower extremity arterial study or sooner if claudication worsens/develops, develops new wound or IRP

## 2021-08-18 ENCOUNTER — TELEPHONE (OUTPATIENT)
Dept: UROLOGY | Age: 71
End: 2021-08-18

## 2021-08-18 DIAGNOSIS — C61 MALIGNANT NEOPLASM OF PROSTATE (HCC): Primary | ICD-10-CM

## 2021-11-04 PROBLEM — I50.9 ACUTE DECOMPENSATED HEART FAILURE (HCC): Status: ACTIVE | Noted: 2021-01-01

## 2021-11-04 NOTE — ED PROVIDER NOTES
[facility] EMERGENCY DEPT  eMERGENCY dEPARTMENT eNCOUnter      Pt Name: Naeem Gonzalez. MRN: [id number]  Birthdate 1950  Date of evaluation: 11/4/2021  Provider: LO Man    CHIEF COMPLAINT       Chief Complaint   Patient presents with    Shortness of Breath     worse with position         HISTORY OF PRESENT ILLNESS   (Location/Symptom, Timing/Onset,Context/Setting, Quality, Duration, Modifying Factors, Severity)  Note limiting factors. Glenna Alberto Jr.is a 66 y.o. male who presents to the emergency department for evaluation of shortness of breath. Pt tells me that he has had worsening shortness of breath since yesterday. He has had cough and wheezing. He denies fevers, vomiting as well as abdominal pain. He has had no chest pain. He tells me that he has had episodes of dizziness at times worse when he closes his eyes. He has history of chf. He denies new or worsening lower extremity edema. He denies sick contacts at home. He has no history of pe/dvt. hospitals    Nursing Notes were reviewed. REVIEW OF SYSTEMS    (2-9 systems for level 4, 10 or more for level 5)     Review of Systems   Constitutional: Negative for fever. Respiratory: Positive for shortness of breath. All other systems reviewed and are negative. A complete review of systems was performed and is negative except as noted above in the HPI. 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      Coronary artery disease 12/17/2018    Diabetes mellitus ([address]     Hypercholesterolemia 12/17/2018    Hypertension     Hypothyroidism 7/6/2018    Rheumatic fever     as child    Systolic congestive heart failure ([address] 12/17/2018         SURGICAL HISTORY       Past Surgical History:   Procedure Laterality Date    APPENDECTOMY      CHOLECYSTECTOMY      PANCREAS SURGERY      STENT PLACED IN BILE DUCT         CURRENT MEDICATIONS       Previous Medications ASPIRIN EC 81 MG EC TABLET    Take 1 tablet by mouth daily    CLOPIDOGREL (PLAVIX) 75 MG TABLET    Take 1 tablet by mouth daily    DOXYLAMINE SUCCINATE (SLEEP AID) 25 MG TABLET    Take 25 mg by mouth nightly    GABAPENTIN (NEURONTIN) 100 MG CAPSULE    Take 1 capsule by mouth 3 times daily for 30 days. GLIPIZIDE (GLUCOTROL) 5 MG TABLET    Take 10 mg by mouth daily     LISINOPRIL (PRINIVIL;ZESTRIL) 10 MG TABLET    lisinopril 10 mg tablet  DAILY    MULTIPLE VITAMINS-MINERALS (COMPLETE SENIOR PO)    Take by mouth       ALLERGIES     Phenergan [promethazine hcl]    FAMILY HISTORY       Family History   Problem Relation Age of Onset    Cancer Mother     Tuberculosis Father           SOCIAL HISTORY       Social History     Socioeconomic History    Marital status:      Spouse name: None    Number of children: None    Years of education: None    Highest education level: None   Occupational History    None   Tobacco Use    Smoking status: Never Smoker    Smokeless tobacco: Current User     Types: Chew   Vaping Use    Vaping Use: Never used   Substance and Sexual Activity    Alcohol use: Not Currently     Comment: occ    Drug use: No    Sexual activity: None   Other Topics Concern    None   Social History Narrative    None     Social Determinants of Health     Financial Resource Strain:     Difficulty of Paying Living Expenses:    Food Insecurity:     Worried About Running Out of Food in the Last Year:     Ran Out of Food in the Last Year:    Transportation Needs:     Lack of Transportation (Medical):      Lack of Transportation (Non-Medical):    Physical Activity:     Days of Exercise per Week:     Minutes of Exercise per Session:    Stress:     Feeling of Stress :    Social Connections:     Frequency of Communication with Friends and Family:     Frequency of Social Gatherings with Friends and Family:     Attends Restorationist Services:     Active Member of Clubs or Organizations:     waves. Normal QRS interval. Normal QT interval. No obvious ST elevation or ST depression. RADIOLOGY:   Non-plain film images such as CT, Ultrasound andMRI are read by the radiologist. Plain radiographic images are visualized and preliminarily interpreted by the emergency physician with the below findings:        Interpretation per the Radiologist below, if available at the time of this note:    XR CHEST PORTABLE   Final Result   1. Mild cardiomegaly no evidence pulmonary vascular congestion. 2. Atelectasis left lower lobe. Signed by Dr Liat Moore            ED BEDSIDE ULTRASOUND:   Performed by ED Physician - none    LABS:  Labs Reviewed   BRAIN NATRIURETIC PEPTIDE - Abnormal; Notable for the following components:       Result Value    Pro-BNP 4,719 (*)     All other components within normal limits   CBC WITH AUTO DIFFERENTIAL - Abnormal; Notable for the following components:    RBC 4.35 (*)     Hemoglobin 13.7 (*)     Hematocrit 40.3 (*)     MCH 31.5 (*)     Neutrophils % 71.5 (*)     Lymphocytes % 17.8 (*)     Basophils % 1.1 (*)     Lymphocytes Absolute 0.9 (*)     All other components within normal limits   COMPREHENSIVE METABOLIC PANEL W/ REFLEX TO MG FOR LOW K - Abnormal; Notable for the following components:    Sodium 135 (*)     Glucose 146 (*)     CREATININE 0.4 (*)     Total Protein 6.4 (*)     All other components within normal limits   BLOOD GAS, ARTERIAL - Abnormal; Notable for the following components:    pO2, Arterial 57.0 (*)     Hemoglobin, Art, Extended 12.7 (*)     All other components within normal limits   COVID-19, RAPID   CULTURE, BLOOD 1   CULTURE, BLOOD 2   LACTIC ACID, PLASMA   TROPONIN   POTASSIUM, WHOLE BLOOD       All other labs were within normal range or not returned as of this dictation. RE-ASSESSMENT     Discussed with Dr. Malu Renee who will admit patient to hospitalist service.        EMERGENCY DEPARTMENT COURSE and DIFFERENTIALDIAGNOSIS/MDM:   Vitals:    Vitals: 11/04/21 1610 11/04/21 1814   BP: (!) 159/104 (!) 133/99   Pulse: 105 104   Resp: 24 18   Temp: 98.5 °F (36.9 °C)    TempSrc: Oral    SpO2: 91% 92%   Weight: 140 lb (63.5 kg)    Height: 5' 10\" (1.778 m)        MDM      CONSULTS:  None    PROCEDURES:  Unless otherwise notedbelow, none     Procedures    FINAL IMPRESSION     1. Acute systolic congestive heart failure ([facility]    2. Ischemic cardiomyopathy          DISPOSITION/PLAN   DISPOSITION Decision To Admit 11/04/2021 07:21:08 PM      PATIENT REFERRED TO:  No follow-up provider specified.     DISCHARGE MEDICATIONS:       Current Discharge Medication List          (Pleasenote that portions of this note were completed with a voice recognition program.  Efforts were made to edit the dictations but occasionally words are mis-transcribed.)             LO Liao  11/04/21 2025

## 2021-11-04 NOTE — PROGRESS NOTES
pH, Arterial 7.440  7.350 - 7.450 Final 11/04/2021  5:12 PM [address] [facility] [place] [facility]   pCO2, Arterial 35.0  35.0 - 45.0 mmHg Final 11/04/2021  5:12 PM [facility]   pO2, Arterial 57. 0Low   80.0 - 100.0 mmHg Final 11/04/2021  5:12 PM [address] [facility]   HCO3, Arterial 23.8  22.0 - 26.0 mmol/L Final 11/04/2021  5:12 PM [facility] Excess, Arterial 0.0  -2.0 - 2.0 mmol/L Final 11/04/2021  5:12 PM [address] [facility]   Hemoglobin, Art, Extended 12.7Low   14.0 - 18.0 g/dL Final 11/04/2021  5:12 PM [address] [facility]   O2 Sat, Arterial 90.8  >92 % Final 11/04/2021  5:12 PM [facility]   Carboxyhgb, Arterial 1.7  0.0 - 5.0 % Final 11/04/2021  5:12 PM [facility]        0.0-1.5   (Smokers 1.5-5.0)    Methemoglobin, Arterial 0.7  <1.5 % Final 11/04/2021  5:12 PM [address] [place] [address] Lab   O2 Content, Arterial 16.2  Not Established mL/dL Final 11/04/2021  5:12 PM [address] [facility]     Patient on room air. ABG's drawn from RR, AT+.

## 2021-11-05 PROBLEM — Z92.3 HISTORY OF RADIATION THERAPY: Status: ACTIVE | Noted: 2019-05-30

## 2021-11-05 PROBLEM — Z72.0 TOBACCO ABUSE: Status: ACTIVE | Noted: 2019-01-03

## 2021-11-05 PROBLEM — E11.9 TYPE 2 DIABETES MELLITUS WITHOUT COMPLICATIONS (HCC): Status: ACTIVE | Noted: 2020-10-30

## 2021-11-05 PROBLEM — M54.50 LOW BACK PAIN: Status: ACTIVE | Noted: 2020-10-30

## 2021-11-05 PROBLEM — C61 MALIGNANT NEOPLASM OF PROSTATE (HCC): Status: ACTIVE | Noted: 2020-10-30

## 2021-11-05 PROBLEM — K21.9 GASTRO-ESOPHAGEAL REFLUX DISEASE WITHOUT ESOPHAGITIS: Status: ACTIVE | Noted: 2020-10-30

## 2021-11-05 PROBLEM — C61: Status: ACTIVE | Noted: 2019-01-03

## 2021-11-05 NOTE — CONSULTS
[id number] [facility]  Cardiology Consult      Requesting MD:  Gonzalo Ness MD   Admit Status:  Inpatient [101]       History obtained from:   [] Patient  [] Other (specify):     Patient:  Carline Joiner     Chief Complaint:   Chief Complaint   Patient presents with    Shortness of Breath     worse with position       HPI: Mr. Jane Root is a 66 y.o. male with a history of multivessel coronary disease seen by me last seen in the office 1/30/2020  Previous cardiac cath 12/4/2018 now comes in increasing shortness of breath for several days. No anginal chest pain. He fell about 2 years ago and has had limited ability to walk with a walker for short distances only since that time. Balance is reported to be very poor. Denies medical noncompliance. Some cough some nausea some edema noted. Troponins are negative. Covid19 status not detected. proBNP 4719. Initial EKG showed sinus tachycardia now noted to be in atrial fibrillation rapid ventricular response rate intravenous digoxin doses ordered. Chest x-ray showed mild cardiomegaly no evidence of pulmonary vascular congestion atelectasis left lower lobe. Echocardiogram was being done at the time of my assessment left ventricular function appears to be quite poor initial ejection fraction reported at 23% moderate to large pericardial effusion not felt to have any definite evidence of tamponade also appears to have apical thrombus present. Not presently on anticoagulation. Review of Systems:  Review of Systems    Cardiac Specific Data:  Specialty Problems        Cardiology Problems    Coronary artery disease        Hypercholesterolemia        Systolic congestive heart failure (HCC)        Ischemic cardiomyopathy        Essential hypertension        Acute decompensated heart failure [facility])              Past Medical History:  Past Medical History:   Diagnosis Date    Arthritis     Asthma     Cancer ([facility] 08/2018    Prostrate.      Current or Ex-Partner:     Emotionally Abused:     Physically Abused:     Sexually Abused: Allergies: Allergies   Allergen Reactions    Phenergan [Promethazine Hcl]        Home Meds:  Prior to Admission medications    Medication Sig Start Date End Date Taking? Authorizing Provider   Multiple Vitamins-Minerals (COMPLETE SENIOR PO) Take by mouth   Yes Historical Provider, MD   doxyLAMINE succinate (SLEEP AID) 25 MG tablet Take 25 mg by mouth nightly   Yes Historical Provider, MD   gabapentin (NEURONTIN) 100 MG capsule Take 1 capsule by mouth 3 times daily for 30 days.  1/15/20 11/4/21 Yes LO Grissom   clopidogrel (PLAVIX) 75 MG tablet Take 1 tablet by mouth daily 6/27/19  Yes Cory Tapia MD   aspirin EC 81 MG EC tablet Take 1 tablet by mouth daily 6/27/19  Yes Cory Tapia MD   glipiZIDE (GLUCOTROL) 5 MG tablet Take 10 mg by mouth daily  9/14/18  Yes Historical Provider, MD   lisinopril (PRINIVIL;ZESTRIL) 10 MG tablet lisinopril 10 mg tablet  DAILY   Yes Historical Provider, MD       Current Meds:   enoxaparin  1 mg/kg SubCUTAneous BID    digoxin  125 mcg IntraVENous Q6H    [START ON 11/6/2021] digoxin  125 mcg IntraVENous Daily    aspirin EC  81 mg Oral Daily    atorvastatin  80 mg Oral Daily    clopidogrel  75 mg Oral Daily    gabapentin  100 mg Oral TID    insulin lispro  0-6 Units SubCUTAneous TID WC    insulin lispro  0-3 Units SubCUTAneous Nightly    lisinopril  10 mg Oral Daily    pantoprazole  40 mg Oral QAM AC    furosemide  40 mg IntraVENous Daily       Current Infused Meds:   dextrose         Physical Exam:  Vitals:    11/05/21 0540   BP: 104/60   Pulse: 116   Resp: (!) 78   Temp: 97.7 °F (36.5 °C)   SpO2: 93%       Intake/Output Summary (Last 24 hours) at 11/5/2021 0942  Last data filed at 11/5/2021 0540  Gross per 24 hour   Intake 150 ml   Output 1250 ml   Net -1100 ml     Estimated body mass index is 22.73 kg/m² as calculated from the following: Height as of this encounter: 5' 10\" (1.778 m). Weight as of this encounter: 158 lb 7 oz (71.9 kg). Physical Exam    Labs:  Recent Labs     11/04/21  1620 11/05/21  0413   WBC 5.2 4.6*   HGB 13.7* 11.6*    149       Recent Labs     11/04/21  1620 11/04/21  1712 11/05/21  0413   *  --  137   K 4.5 3.9 3.8     --  104   CO2 23  --  25   BUN 8  --  9   CREATININE 0.4*  --  0.6   LABGLOM >60  --  >60   MG  --   --  1.9   CALCIUM 9.0  --  8.3*       CK, CKMB, Troponin: @LABRCNT (CKTOTAL:3, CKMB:3, TROPONINI:3)@    Last 3 BNP:  No results for input(s): BNP in the last 72 hours. IMAGING:  XR CHEST PORTABLE    Result Date: 11/4/2021  EXAMINATION: XR CHEST PORTABLE 11/4/2021 5:52 PM HISTORY: XR CHEST PORTABLE 11/4/2021 4:40 PM HISTORY: Short of air COMPARISON: November 26, 2018. FINDINGS: Right lung is clear. There is atelectasis left lower lobe. . Cardiac silhouettes mildly enlarged. . The osseous structures and surrounding soft tissues demonstrate no acute abnormality. 1. Mild cardiomegaly no evidence pulmonary vascular congestion. 2. Atelectasis left lower lobe. Signed by Dr Amor Espinoza      Assessment:  1. Atrial fibrillation rapid ventricular sponsor rate  2. Complaints of increased shortness of breath  3. Coronary artery disease  4. Hyperlipidemia  5. Acute and chronic systolic and diastolic congestive heart failure  6. Ischemic cardiomyopathy  7. Diabetes mellitus type 2  8. Hypothyroidism  9. MRI of the brain 4/9/2021 no acute abnormalities mild generalized cerebral volume loss and mild chronic microvascular changes  10. CT abdomen pelvis 2/17/2020 unchanged from 1-1/2 years ago no acute findings  11. Lexiscan 11/20/2018 EF 30% moderately severe global hypokinesis apical akinesis or dyskinesis large extensive infarct or scar in the mid to distal anterior anteroseptal and anteroapical region no ischemia identified  12.  Carotid duplex 3/28/2019 less than 50% stenosis internal

## 2021-11-05 NOTE — PROGRESS NOTES
HR remains at 130~150s, Afib while resting in the bed. No change after giving scheduled IV digoxin. Notified Farnaz Figueroa RN with cardiology. Will refer to Dr. Burgess Rivera.

## 2021-11-05 NOTE — H&P
[address]    Patient: Ben Mccall. : 1950   MRN: [id number]  Code Status: Full Code  PCP: LO Patel CNP  Date of Service: 2021    Chief Complaint:   Shortness of breath    History of Present Illness:   78-year-old male with past medical history as listed below presented to ER with shortness of breath over the past 2 days. Denies chest pain. History of ischemic cardiomyopathy. Patient of Dr. Inderjit Delgado but has not seen him in over 2-3 years. No further history provided. Review of Systems:   A comprehensive review of systems was negative except for: Respiratory: positive for shortness of breath    Past Medical History:     Past Medical History:   Diagnosis Date    Arthritis     Asthma     Cancer [address] 2018    Prostrate.      Coronary artery disease 2018    Diabetes mellitus ([facility]     Hypercholesterolemia 2018    Hypertension     Hypothyroidism 2018    Rheumatic fever     as child    Systolic congestive heart failure ([facility] 2018         Past Surgical History:     Past Surgical History:   Procedure Laterality Date    APPENDECTOMY      CHOLECYSTECTOMY      PANCREAS SURGERY      STENT PLACED IN BILE DUCT        Family History:     Family History   Problem Relation Age of Onset    Cancer Mother     Tuberculosis Father         Social History:     Social History     Socioeconomic History    Marital status:      Spouse name: None    Number of children: None    Years of education: None    Highest education level: None   Occupational History    None   Tobacco Use    Smoking status: Never Smoker    Smokeless tobacco: Current User     Types: Chew   Vaping Use    Vaping Use: Never used   Substance and Sexual Activity    Alcohol use: Not Currently     Comment: occ    Drug use: No    Sexual activity: None   Other Topics Concern    None   Social History Narrative    None     Social Determinants of Health     Financial Resource Strain:     Difficulty of Paying Living Expenses:    Food Insecurity:     Worried About Running Out of Food in the Last Year:     [address] in the Last Year:    Transportation Needs:     Lack of Transportation (Medical):  Lack of Transportation (Non-Medical):    Physical Activity:     Days of Exercise per Week:     Minutes of Exercise per Session:    Stress:     Feeling of Stress :    Social Connections:     Frequency of Communication with Friends and Family:     Frequency of Social Gatherings with Friends and Family:     Attends Hinduism Services:     Active Member of Clubs or Organizations:     Attends Club or Organization Meetings:     Marital Status:    Intimate Partner Violence:     Fear of Current or Ex-Partner:     Emotionally Abused:     Physically Abused:     Sexually Abused:        Prior to Admission Medications:   Medications Prior to Admission: Multiple Vitamins-Minerals (COMPLETE SENIOR PO), Take by mouth  doxyLAMINE succinate (SLEEP AID) 25 MG tablet, Take 25 mg by mouth nightly  gabapentin (NEURONTIN) 100 MG capsule, Take 1 capsule by mouth 3 times daily for 30 days. clopidogrel (PLAVIX) 75 MG tablet, Take 1 tablet by mouth daily  aspirin EC 81 MG EC tablet, Take 1 tablet by mouth daily  glipiZIDE (GLUCOTROL) 5 MG tablet, Take 10 mg by mouth daily   lisinopril (PRINIVIL;ZESTRIL) 10 MG tablet, lisinopril 10 mg tablet  DAILY     Allergies:      Allergies   Allergen Reactions    Phenergan [Promethazine Hcl]          Physical Exam:   BP (!) 153/108   Pulse 83   Temp 97.5 °F (36.4 °C) (Temporal)   Resp 20   Ht 5' 10\" (1.778 m)   Wt 158 lb 7 oz (71.9 kg)   SpO2 96%   BMI 22.73 kg/m²     General: no acute distress  HEENT: normocephalic, atraumatic  Neck: supple, symmetrical, trachea midline   Lungs: bilateral crackles  Cardiovascular: s1 and s2 normal  Abdomen: soft, positive bowel sounds  Extremities: 2+ pitting edema in lower extremities bilaterally  Neuro: aaox3, no focal deficits   Skin: normal color and texture     Recent Results (from the past 72 hour(s))   Lactic Acid, Plasma    Collection Time: 11/04/21  4:20 PM   Result Value Ref Range    Lactic Acid 1.2 0.5 - 1.9 mmol/L   PROBNP, N-TERMINAL    Collection Time: 11/04/21  4:20 PM   Result Value Ref Range    Pro-BNP 4,719 (H) 0 - 900 pg/mL   Troponin    Collection Time: 11/04/21  4:20 PM   Result Value Ref Range    Troponin 0.02 0.00 - 0.03 ng/mL   CBC Auto Differential    Collection Time: 11/04/21  4:20 PM   Result Value Ref Range    WBC 5.2 4.8 - 10.8 K/uL    RBC 4.35 (L) 4.70 - 6.10 M/uL    Hemoglobin 13.7 (L) 14.0 - 18.0 g/dL    Hematocrit 40.3 (L) 42.0 - 52.0 %    MCV 92.6 80.0 - 94.0 fL    MCH 31.5 (H) 27.0 - 31.0 pg    MCHC 34.0 33.0 - 37.0 g/dL    RDW 13.5 11.5 - 14.5 %    Platelets 785 633 - 188 K/uL    MPV 10.6 9.4 - 12.4 fL    Neutrophils % 71.5 (H) 50.0 - 65.0 %    Lymphocytes % 17.8 (L) 20.0 - 40.0 %    Monocytes % 7.5 0.0 - 10.0 %    Eosinophils % 1.9 0.0 - 5.0 %    Basophils % 1.1 (H) 0.0 - 1.0 %    Neutrophils Absolute 3.7 1.5 - 7.5 K/uL    Immature Granulocytes # 0.0 K/uL    Lymphocytes Absolute 0.9 (L) 1.1 - 4.5 K/uL    Monocytes Absolute 0.40 0.00 - 0.90 K/uL    Eosinophils Absolute 0.10 0.00 - 0.60 K/uL    Basophils Absolute 0.10 0.00 - 0.20 K/uL   Comprehensive Metabolic Panel w/ Reflex to MG    Collection Time: 11/04/21  4:20 PM   Result Value Ref Range    Sodium 135 (L) 136 - 145 mmol/L    Potassium reflex Magnesium 4.5 3.5 - 5.0 mmol/L    Chloride 104 98 - 111 mmol/L    CO2 23 22 - 29 mmol/L    Anion Gap 8 7 - 19 mmol/L    Glucose 146 (H) 74 - 109 mg/dL    BUN 8 8 - 23 mg/dL    CREATININE 0.4 (L) 0.5 - 1.2 mg/dL    GFR Non-African American >60 >60    GFR African American >59 >59    Calcium 9.0 8.8 - 10.2 mg/dL    Total Protein 6.4 (L) 6.6 - 8.7 g/dL    Albumin 3.6 3.5 - 5.2 g/dL    Total Bilirubin 0.8 0.2 - 1.2 mg/dL    Alkaline Phosphatase 75 40 - 130 U/L ALT 18 5 - 41 U/L    AST 20 5 - 40 U/L   COVID-19, Rapid    Collection Time: 11/04/21  4:40 PM    Specimen: Nasopharyngeal Swab   Result Value Ref Range    SARS-CoV-2, NAAT Not Detected Not Detected   BLOOD GAS, ARTERIAL    Collection Time: 11/04/21  5:12 PM   Result Value Ref Range    pH, Arterial 7.440 7.350 - 7.450    pCO2, Arterial 35.0 35.0 - 45.0 mmHg    pO2, Arterial 57.0 (L) 80.0 - 100.0 mmHg    HCO3, Arterial 23.8 22.0 - 26.0 mmol/L    Base Excess, Arterial 0.0 -2.0 - 2.0 mmol/L    Hemoglobin, Art, Extended 12.7 (L) 14.0 - 18.0 g/dL    O2 Sat, Arterial 90.8 >92 %    Carboxyhgb, Arterial 1.7 0.0 - 5.0 %    Methemoglobin, Arterial 0.7 <1.5 %    O2 Content, Arterial 16.2 Not Established mL/dL    O2 Therapy Unknown    Potassium, Whole Blood    Collection Time: 11/04/21  5:12 PM   Result Value Ref Range    Potassium, Whole Blood 3.9        I/O this shift:  In: -   Out: 400 [Urine:400]    XR CHEST PORTABLE    Result Date: 11/4/2021  1. Mild cardiomegaly no evidence pulmonary vascular congestion. 2. Atelectasis left lower lobe.  Signed by Dr Ade Killian and Plan:   Acute on chronic decompensated systolic and diastolic congestive heart failure  Diuretics  Strict I's and O's  Daily weights  Serial troponin  Last echo on file 8/20/2018  TTE ordered  Patient of Dr. Joanna Ramirez  Cardiology consulted  Cath 12/4/2018: Severe multivessel coronary artery disease    DM2  Meds on board    Tobacco dependence  Counseled    DVT prophylaxis  Alda Cedeno MD  11/4/2021 10:51 PM

## 2021-11-05 NOTE — CARE COORDINATION
Initial CM Assessment    Initial Assessment Completed at bedside with:    [x]   Patient  [x]   Family/Caregiver/Guardian - dtr at bedside    []   Other:      Patient Contact Information:  [address]  [phone] (home)   Above information verified? [x]   Yes  []   No    ADLS:    Uses a walker for ambulation at home  Support System:    Spouse and children  Plan to return to current housing:   [x]   Yes  []   No    Transportation plan for Discharge:  Spouse or children     Do you have any unmet social needs that would keep you from returning home safely:  []   Yes  [x]   No              Unmet Social Needs Notes:       Had [address] [facility] prior to admission:    []   Yes  [x]   No    Currently ACTIVE with [address]:    []   Yes  [x]   No  []   Interested at discharge  [address] [facility] [place] [facility]:      Current PCP:  LO [place] CNP  PCP verified? [x]   Yes  []   No  Pharmacy:    [facility] [id number]  Phone: [phone] Fax: [phone]    Prefer to use Meds to Bed? []   Yes  [x]   No  Potential assistance purchasing medications? []   Yes  [x]   No    Active with HD/PD prior to admission:           []   Yes  [x]   No  HD Center:       Financial:  Payor: Autumn Gut / Plan: MEDICARE PART A AND B / Product Type: *No Product type* /     Pre-Cert required for SNF:   []   Yes  [x]   No    Patient Deficits:  []   Yes   []   No    If yes:  []   Confusion/Memory  []   Visual  []   Motor/Sensory         []   Right arm         []   Right leg         []   Left arm         []   Left leg  []   Language/Speech         []   Aphasia         []   Dysarthria         []   Swallow         [place] Coma Scale  Eye Opening: Spontaneous  Best Verbal Response: Oriented  Best Motor Response: Obeys commands  [place] Coma Scale Score: 15    Patient Deficit Notes:        Additional CM/SW Notes: Davidliborio Nageotte and/or his family were provided with choice of provider:  [x]   Yes   []   No      Patient Admission Status:   Inpatient [101]    [address] Management  Electronically signed by Alaina Giordano on 11/5/2021 at 10:05 AM

## 2021-11-05 NOTE — PROGRESS NOTES
[facility]        Hospitalist Progress Note  11/5/2021 2:55 PM  Subjective:   Admit Date: 11/4/2021  PCP: LO Madden CNP    Subjective:Patient was seen and examined by the bedside this morning he has no new complaint. He denied fever, cough, chest pain, palpitations, SOB. Per nursing team, patient was tachycardic earlier in the morning. Cumulative Hospital History:   Patient is a 65 y/o male with MHx significant for ischemic cardiomyopathy who was admitted on account of worsening shortness of breath on exertion. On admission, patient was noted to be in A. fib with RVR. 2D echo was reported to show LVEF of 23% and moderate to large pericardial effusion. No evidence of tamponade    ROS: 14 point review of systems is negative except as specifically addressed above. ADULT DIET; Regular; 4 carb choices (60 gm/meal);  Low Fat/Low Chol/High Fiber/2 gm Na    Intake/Output Summary (Last 24 hours) at 11/5/2021 1455  Last data filed at 11/5/2021 1005  Gross per 24 hour   Intake 150 ml   Output 2050 ml   Net -1900 ml     Medications:   dextrose       Current Facility-Administered Medications   Medication Dose Route Frequency Provider Last Rate Last Admin    enoxaparin (LOVENOX) injection 70 mg  1 mg/kg SubCUTAneous BID Anju Oreilly MD   70 mg at 11/05/21 0913    digoxin (LANOXIN) injection 125 mcg  125 mcg IntraVENous Q6H Anju Oreilly MD   125 mcg at 11/05/21 0912    metoprolol tartrate (LOPRESSOR) tablet 25 mg  25 mg Oral BID Althea Norwood MD   25 mg at 11/05/21 1003    [START ON 11/6/2021] digoxin (LANOXIN) tablet 125 mcg  125 mcg Oral Daily Althea Norwood MD        perflutren lipid microspheres (DEFINITY) injection 1.65 mg  1.5 mL IntraVENous ONCE PRN Althea Norwood MD   1.65 mg at 11/05/21 0815    aspirin EC tablet 81 mg  81 mg Oral Daily Anju Oreilly MD   81 mg at 11/05/21 0912    atorvastatin (LIPITOR) tablet 80 mg  80 mg Oral Daily Anju Oreilly MD   80 mg at 11/05/21 0912    clopidogrel (PLAVIX) tablet 75 mg  75 mg Oral Daily Baylee Momin MD   75 mg at 11/05/21 0912    gabapentin (NEURONTIN) capsule 100 mg  100 mg Oral TID Baylee Momin MD   100 mg at 11/05/21 1354    insulin lispro (HUMALOG) injection vial 0-6 Units  0-6 Units SubCUTAneous TID  Baylee Momin MD        insulin lispro (HUMALOG) injection vial 0-3 Units  0-3 Units SubCUTAneous Nightly Baylee Momin MD        lisinopril (PRINIVIL;ZESTRIL) tablet 10 mg  10 mg Oral Daily Baylee Momin MD   10 mg at 11/05/21 0912    pantoprazole (PROTONIX) tablet 40 mg  40 mg Oral QAM AC Baylee Momin MD        acetaminophen (TYLENOL) tablet 650 mg  650 mg Oral Q6H PRN Baylee Momin MD        Or    acetaminophen (TYLENOL) suppository 650 mg  650 mg Rectal Q6H PRN Baylee Momin MD        glucose (GLUTOSE) 40 % oral gel 15 g  15 g Oral PRN Baylee Momin MD        dextrose 50 % IV solution  12.5 g IntraVENous PRN Baylee Momin MD        glucagon (rDNA) injection 1 mg  1 mg IntraMUSCular PRN Baylee Momin MD        dextrose 5 % solution  100 mL/hr IntraVENous PRN Baylee Momin MD        furosemide (LASIX) injection 40 mg  40 mg IntraVENous Daily Baylee Momin MD   40 mg at 11/05/21 0912        Labs:     Recent Labs     11/04/21  1620 11/05/21 0413   WBC 5.2 4.6*   RBC 4.35* 3.66*   HGB 13.7* 11.6*   HCT 40.3* 34.7*   MCV 92.6 94.8*   MCH 31.5* 31.7*   MCHC 34.0 33.4    149     Recent Labs     11/04/21  1620 11/04/21  1712 11/05/21  0413   *  --  137   K 4.5 3.9 3.8   ANIONGAP 8  --  8     --  104   CO2 23  --  25   BUN 8  --  9   CREATININE 0.4*  --  0.6   GLUCOSE 146*  --  201*   CALCIUM 9.0  --  8.3*     Recent Labs     11/05/21  0413   MG 1.9     Recent Labs     11/04/21  1620 11/05/21  0413   AST 20 11   ALT 18 15   BILITOT 0.8 0.5   ALKPHOS 75 68     ABGs:No results for input(s): PH, PO2, PCO2, HCO3, BE, O2SAT in the last 72 hours.   Troponin T:   Recent Labs 11/04/21  1620 11/04/21  2234 11/05/21  0413   TROPONINI 0.02 0.03 0.03     INR: No results for input(s): INR in the last 72 hours. Lactic Acid:   Recent Labs     11/04/21  1620   LACTA 1.2       Objective:   Vitals: /64   Pulse 102   Temp 96.4 °F (35.8 °C) (Temporal)   Resp 16   Ht 5' 10\" (1.778 m)   Wt 158 lb 7 oz (71.9 kg)   SpO2 94%   BMI 22.73 kg/m²   24HR INTAKE/OUTPUT:      Intake/Output Summary (Last 24 hours) at 11/5/2021 1455  Last data filed at 11/5/2021 1005  Gross per 24 hour   Intake 150 ml   Output 2050 ml   Net -1900 ml     General appearance: Elderly  male seen lying down in no acute cardiopulmonary distress. Alert and cooperative with exam  HEENT: atraumatic, eyes with clear conjunctiva and normal lids, pupils and irises normal, external ears and nose are normal, lips normal  Lungs: no increased work of breathing, CTA B  Heart: Tachycardic. Regular rhythm, S1-S2 heard, no m,r,g  Abdomen: Soft, NT, ND, BS +  Extremities: No cyanosis, no edema. Peripheral pulses present  Neurologic: Alert and oriented, affect and mood appropriate  Skin: no rashes, nodules    Assessment and Plan: Active Problems:    Acute decompensated heart failure ([address]  Resolved Problems:    * No resolved hospital problems.  *    Plan:  #Acute exacerbation of heart failure with reduced ejection fraction  #Ischemic cardiomyopathy  #Moderate to large pericardial effusion  -Continue IV diuresis  -Continue patient on aspirin, Plavix, statin  -Continue patient on metoprolol, lisinopril  -Cardiology team on board and management, recs appreciated    #Diabetes mellitus type 2  -Start patient on regular Accu-Chek, SSI and hypoglycemic protocol    #Other comorbidities-continue home meds    Advance Directive: Full Code    DVT prophylaxis: Lovenox    Discharge planning: TBD      Signed:  Marisa Esquivel MD 11/5/2021 2:55 PM  Rounding Hospitalist

## 2021-11-05 NOTE — SIGNIFICANT EVENT
New onset atrial fibrillation with RVR  Occurred after admission  Digoxin  [id number] score >2  Full dose Lovenox  Serial troponin  TTE  Thyroid panel  Follow electrolytes  Cardiology consulted    Total critical care time: 37 minutes    Christiano Astorga MD  11/5/2021

## 2021-11-06 NOTE — PROGRESS NOTES
Cardiology Progress Note Brigida Diallo MD      Patient:  Rhea Ko  [id number]    Patient Active Problem List    Diagnosis Date Noted    Blurred vision, right eye 03/28/2019     Priority: High    Ischemic cardiomyopathy 01/17/2019     Priority: High    Coronary artery disease 12/17/2018     Priority: High    Hypercholesterolemia 12/17/2018     Priority: High    Systolic congestive heart failure ([facility] [address] 12/17/2018     Priority: High    Abnormal nuclear stress test 11/26/2018     Priority: High    Acute decompensated heart failure ([facility] [address] 11/04/2021     Priority: Low    Gastro-esophageal reflux disease without esophagitis 10/30/2020     Priority: Low    Low back pain 10/30/2020     Priority: Low    Malignant neoplasm of prostate ([facility] [address] 10/30/2020     Priority: Low    Type 2 diabetes mellitus without complications ([facility] [address] 46/78/8112     Priority: Low    History of radiation therapy 05/30/2019     Priority: Low    BMI 23.0-23.9, adult 01/09/2019     Priority: Low    Adenocarcinoma of prostate, stage 2 ([facility] [address] 01/03/2019     Priority: Low     Overview Note:     Formatting of this note might be different from the original.  Stage IIC (T2c, N0, cM0) Inn[address] 8 Adenocarcinoma of the Prostate with Pre PSA of 7.6      Tobacco abuse 01/03/2019     Priority: Low    Diabetes mellitus ([facility] [address] 12/20/2018     Priority: Low    Family history of cancer 12/20/2018     Priority: Low    Essential hypertension 09/24/2018     Priority: Low    ESPINOSA (dyspnea on exertion) 09/24/2018     Priority: Low    History of diabetes mellitus 09/24/2018     Priority: Low    Diastolic dysfunction 64/48/2184     Priority: Low    Hypothyroidism 07/06/2018     Priority: Low       Admit Date:  11/4/2021    Admission Problem List: Present on Admission:   Acute decompensated heart failure [facility])      Cardiac Specific Data:  Specialty Problems        Cardiology Problems    Coronary artery disease        Hypercholesterolemia        Systolic congestive is no mass. Tenderness: There is no abdominal tenderness. There is no guarding or rebound. Comments: No palpable organomegaly   Musculoskeletal:         General: No deformity. Skin:     General: Skin is warm. Coloration: Skin is not pale. Findings: No erythema or rash. Neurological:      Mental Status: He is alert and oriented to person, place, and time. Motor: No abnormal muscle tone. Coordination: Coordination normal.      Deep Tendon Reflexes: Reflexes normal.                 Lab Data:  CBC:   Recent Labs     11/04/21 1620 11/05/21 0413 11/06/21  0813   WBC 5.2 4.6* 4.1*   HGB 13.7* 11.6* 11.5*   HCT 40.3* 34.7* 34.0*   MCV 92.6 94.8* 93.2    149 138     BMP:   Recent Labs     11/05/21 0413 11/06/21  0813 11/06/21  0932    139 138   K 3.8 3.5 3.6    103 102   CO2 25 23 24   BUN 9 10 10   CREATININE 0.6 0.6 0.6     LIVER PROFILE:   Recent Labs     11/04/21 1620 11/05/21 0413 11/06/21  0813   AST 20 11 13   ALT 18 15 13   BILITOT 0.8 0.5 0.7   ALKPHOS 75 68 61     PT/INR: No results for input(s): PROTIME, INR in the last 72 hours. APTT: No results for input(s): APTT in the last 72 hours. BNP:  No results for input(s): BNP in the last 72 hours. CK, CKMB, Troponin: @LABRCNT (CKTOTAL:3, CKMB:3, TROPONINI:3)@    IMAGING:  ECHO Complete 2D W Doppler W Color    Result Date: 11/5/2021  Transthoracic Echocardiography Report (TTE)  Demographics   Patient Name   TIAGO Gao   Date of Study           11/05/2021   MRN            [id number]           Gender                  Male   Date of Birth  1950       Room Number             MHL-0421   Age            66 year(s)   Height:        70 inches        Referring Physician     Meaghan Terry   Weight:        158 pounds       Sonographer             Grace Daniel [facility]   BSA:           1.89 m^2         Interpreting Physician  James Covarrubias   BMI:           22.67 kg/m^2  Procedure Type of Study   TTE procedure:ECHO 2D W/DOPPLER/COLOR/CONTRAST. Study Location: Echo Lab Technical Quality: Limited visualization due to poor acoustical window. Patient Status: Inpatient Contrast Medium: Definity. Amount - 4 ml Rhythm: Atrial fibrillation BP: 104/60 mmHg Indications:Congestive heart failure. Conclusions   Summary  Mildly dilated left ventricular size with severely reduced LV function and  an estimated ejection fraction of approximately 25%. Severe diffuse global  hypokinesis. No evidence of left ventricular mass or thrombus noted. Normal right ventricular size with reduced RV function. Mild-moderate mitral regurgitation. Mild tricuspid regurgitation with normal estimated RVSP. Aortic root and ascending aorta are within normal limits. Moderate pericardial effusion with echocardiographic evidence for early  tamponade physiology. Reduced SV and CO (SV 32 ml, SVI 17 ml/m2, CO 3.9 L/min, CI 2.0 L/min/m2)  Left pleural effusion is present. Findings discussed with charge nurse Kristi at the time of dictation. Signature   ----------------------------------------------------------------  Electronically signed by Marti Eric(Interpreting physician)  on 11/05/2021 11:12 AM  ----------------------------------------------------------------   Findings   Mitral Valve  Structurally normal mitral valve with normal leaflet mobility. No evidence  of mitral valve stenosis. Mild-moderate mitral regurgitation (ERO 0.27 cm2, MR volume 34 ml, MR  fraction 20%). Aortic Valve  Aortic valve appears to be tricuspid. Mild sclerosis of the aortic valve. No significant aortic regurgitation or stenosis is noted. Tricuspid Valve  Tricuspid valve is structurally normal.  Mild tricuspid regurgitation with normal estimated RVSP. Pulmonic Valve  The pulmonic valve was not well visualized. Trace pulmonic regurgitation present. Left Atrium  Mildly dilated left atrium.    Left Ventricle  Mildly dilated left ventricular size with severely reduced LV function and  an estimated ejection fraction of approximately 25%. Severe diffuse global  hypokinesis. No evidence of left ventricular mass or thrombus noted. Right Atrium  Normal right atrial dimension. Right Ventricle  Normal right ventricular size with reduced RV function. Pericardial Effusion  Moderate pericardial effusion with echocardiographic evidence for  hemodynamic significance. Pleural Effusion  Left pleural effusion is present. Miscellaneous  Aortic root and ascending aorta are within normal limits. Allergies   - Phenergan. M-Mode Measurements (cm)   LVIDd: 5.25 cm                       LVIDs: 4.77 cm  IVSd: 1.19 cm  LVPWd: 1.2 cm                        AO Root Dimension: 3.7 cm  % Ejection Fraction: 23 %            LA: 4.2 cm                                       LVOT: 2.1 cm  Doppler Measurements:   AV Peak Velocity:99.2 cm/s           MV Peak E-Wave: 101 cm/s  AV Peak Gradient: 3.94 mmHg  AV Mean Gradient: 2 mmHg             MV Peak Gradient: 4.08 mmHg  AV Area (Continuity):1.92 cm^2       MV P1/2t: 23 msec  TR Velocity:270 cm/s                 MVA by PHT9.57 cm^2  TR Gradient:29.16 mmHg  Estimated RAP:3 mmHg  RVSP:32 mmHg      XR CHEST PORTABLE    Result Date: 11/4/2021  EXAMINATION: XR CHEST PORTABLE 11/4/2021 5:52 PM HISTORY: XR CHEST PORTABLE 11/4/2021 4:40 PM HISTORY: Short of air COMPARISON: November 26, 2018. FINDINGS: Right lung is clear. There is atelectasis left lower lobe. . Cardiac silhouettes mildly enlarged. . The osseous structures and surrounding soft tissues demonstrate no acute abnormality. 1. Mild cardiomegaly no evidence pulmonary vascular congestion. 2. Atelectasis left lower lobe.  Signed by Dr Andie Coe and Plan:    67-year-old gentleman with past medical history of diabetes mellitus type 2, hypertension, severe ischemic cardiomyopathy with ejection fraction 25%, known occluded LAD with extensive scar with limited apical viability, three-vessel disease managed medically, lost to follow-up presenting with acute on chronic systolic and diastolic heart failure in the setting of atrial fibrillation since converted to sinus with evidence of possible layered apical thrombus as well as moderate to large pericardial effusion. 1.  I have reviewed his echocardiogram.  Echo does show possible layered thrombus at the apex. A moderate large pericardial effusion is noted predominantly located anteriorly and inferiorly. Would repeat echo tomorrow as patient is on anticoagulation with Lovenox to ensure no worsening. He is also on diuresis with Lasix 40 mg twice daily. 2.  Can switch Lopressor to Coreg 12.5 mg twice daily. Continue lisinopril. Add Aldactone 25 mg daily. Currently on amiodarone 200 mg twice daily and remaining in sinus rhythm.         Mak Diaz MD, MD 11/6/2021 2:20 PM

## 2021-11-06 NOTE — PROGRESS NOTES
[facility]        Hospitalist Progress Note  11/6/2021 1:36 PM  Subjective:   Admit Date: 11/4/2021  PCP: LO Juárez CNP    Chief Complaint: Dyspnea    Subjective: Patient seen and examined at bedside with family present. Breathing improving. Denies chest pain. No other acute complaints. Cumulative Hospital History: 41-year-old male with a past medical history of CAD, prostate cancer, combined systolic and diastolic congestive heart failure, DM, tobacco abuse presenting to the hospital for dyspnea admitted for acute on chronic congestive heart failure. Rapid response called morning after admission for new onset atrial fibrillation with RVR with cardiology consulted. Echo showing moderate pericardial effusion. ROS: 14 point review of systems is negative except as specifically addressed above. ADULT DIET; Regular; 4 carb choices (60 gm/meal);  Low Fat/Low Chol/High Fiber/2 gm Na    Intake/Output Summary (Last 24 hours) at 11/6/2021 1336  Last data filed at 11/6/2021 1218  Gross per 24 hour   Intake 740 ml   Output 1575 ml   Net -835 ml     Medications:   dextrose      dextrose       Current Facility-Administered Medications   Medication Dose Route Frequency Provider Last Rate Last Admin    amiodarone (CORDARONE) tablet 200 mg  200 mg Oral BID Lesly Dunlap MD        enoxaparin (LOVENOX) injection 70 mg  1 mg/kg SubCUTAneous BID Tanika Hollis MD   70 mg at 11/06/21 0854    metoprolol tartrate (LOPRESSOR) tablet 25 mg  25 mg Oral BID Robin Pierre MD   25 mg at 11/06/21 0855    digoxin (LANOXIN) tablet 125 mcg  125 mcg Oral Daily Robin Pierre MD   125 mcg at 11/06/21 0855    glucose (GLUTOSE) 40 % oral gel 15 g  15 g Oral PRN Mainor Balderas MD        dextrose 50 % IV solution  12.5 g IntraVENous PRN Mainor Balderas MD        glucagon (rDNA) injection 1 mg  1 mg IntraMUSCular PRN Mainor Balderas MD        dextrose 5 % solution  100 mL/hr IntraVENous PRN Henrry Rasmussen MD        furosemide (LASIX) injection 40 mg  40 mg IntraVENous BID Henrry Rasmussen MD   40 mg at 11/06/21 0855    ondansetron (ZOFRAN) injection 4 mg  4 mg IntraVENous Q6H PRN Henrry Rasmussen MD   4 mg at 11/05/21 1745    aspirin EC tablet 81 mg  81 mg Oral Daily [place] Day MD   81 mg at 11/06/21 0855    atorvastatin (LIPITOR) tablet 80 mg  80 mg Oral Daily Florida Day MD   80 mg at 11/06/21 0855    clopidogrel (PLAVIX) tablet 75 mg  75 mg Oral Daily Florida Day MD   75 mg at 11/06/21 0856    gabapentin (NEURONTIN) capsule 100 mg  100 mg Oral TID Florida Day MD   100 mg at 11/06/21 0855    insulin lispro (HUMALOG) injection vial 0-6 Units  0-6 Units SubCUTAneous TID WC [place] Day MD   1 Units at 11/06/21 1156    insulin lispro (HUMALOG) injection vial 0-3 Units  0-3 Units SubCUTAneous Nightly Florida Day MD   1 Units at 11/05/21 2158    lisinopril (PRINIVIL;ZESTRIL) tablet 10 mg  10 mg Oral Daily [place] Day MD   10 mg at 11/06/21 0855    pantoprazole (PROTONIX) tablet 40 mg  40 mg Oral QAM AC Florida Day MD   40 mg at 11/06/21 0541    acetaminophen (TYLENOL) tablet 650 mg  650 mg Oral Q6H PRN [place] Day MD        Or    acetaminophen (TYLENOL) suppository 650 mg  650 mg Rectal Q6H PRN Florida Day MD        glucose (GLUTOSE) 40 % oral gel 15 g  15 g Oral PRN Florida Day MD        dextrose 50 % IV solution  12.5 g IntraVENous PRN Florida Day MD        glucagon (rDNA) injection 1 mg  1 mg IntraMUSCular PRN Florida Day MD        dextrose 5 % solution  100 mL/hr IntraVENous PRN [place] Day MD            Labs:     Recent Labs     11/04/21  1620 11/05/21  0413 11/06/21  0813   WBC 5.2 4.6* 4.1*   RBC 4.35* 3.66* 3.65*   HGB 13.7* 11.6* 11.5*   HCT 40.3* 34.7* 34.0*   MCV 92.6 94.8* 93.2   MCH 31.5* 31.7* 31.5*   MCHC 34.0 33.4 33.8    149 138     Recent Labs     11/05/21  0413 11/06/21  0813 11/06/21  0932    139 138   K 3.8 3.5 3. 6   ANIONGAP 8 13 12    103 102   CO2 25 23 24   BUN 9 10 10   CREATININE 0.6 0.6 0.6   GLUCOSE 201* 105 165*   CALCIUM 8.3* 8.3* 8.7*     Recent Labs     11/05/21  0413 11/06/21  0813   MG 1.9 1.9     Recent Labs     11/04/21  1620 11/05/21  0413 11/06/21  0813   AST 20 11 13   ALT 18 15 13   BILITOT 0.8 0.5 0.7   ALKPHOS 75 68 61     ABGs:No results for input(s): PH, PO2, PCO2, HCO3, BE, O2SAT in the last 72 hours. Troponin T:   Recent Labs     11/04/21  1620 11/04/21  2234 11/05/21 0413   TROPONINI 0.02 0.03 0.03     INR: No results for input(s): INR in the last 72 hours. Lactic Acid:   Recent Labs     11/04/21  1620   LACTA 1.2       Objective:   Vitals: /86   Pulse 67   Temp 96.9 °F (36.1 °C) (Temporal)   Resp 16   Ht 5' 10\" (1.778 m)   Wt 158 lb 7 oz (71.9 kg)   SpO2 97%   BMI 22.73 kg/m²   24HR INTAKE/OUTPUT:      Intake/Output Summary (Last 24 hours) at 11/6/2021 1336  Last data filed at 11/6/2021 1218  Gross per 24 hour   Intake 740 ml   Output 1575 ml   Net -835 ml     General appearance: Alert  HEENT: AT/NC  Lungs: BLAE, no wheezing appreciated  Heart: RRR, +murmur  Abdomen: BS+, soft, NT, ND  Extremities: +LE edema, pulses+  Neurologic: Alert, gross motor function intact  Skin: warm      Assessment and Plan: Active Problems:    Acute decompensated heart failure ([address]  Resolved Problems:    * No resolved hospital problems. *    CHF/Pericardial effusion: Diuretics. Monitor I's and O's. Afib: RC/AC.    CAD: Aspirin/Statin. DM: Monitor BG and adjust medications PRN.     Advance Directive: Full Code    DVT prophylaxis: Therapeutic Lovenox    Discharge planning: TBD      Signed:  Michell Mansfield MD 11/6/2021 1:36 PM  Rounding Hospitalist

## 2021-11-07 NOTE — PROGRESS NOTES
Cardiology Progress Note Leopoldo Closs, MD      Patient:  Aida Rogers  [id number]    Patient Active Problem List    Diagnosis Date Noted    Blurred vision, right eye 03/28/2019     Priority: High    Ischemic cardiomyopathy 01/17/2019     Priority: High    Coronary artery disease 12/17/2018     Priority: High    Hypercholesterolemia 12/17/2018     Priority: High    Systolic congestive heart failure ([facility] 12/17/2018     Priority: High    Abnormal nuclear stress test 11/26/2018     Priority: High    Acute decompensated heart failure ([address] 11/04/2021     Priority: Low    Gastro-esophageal reflux disease without esophagitis 10/30/2020     Priority: Low    Low back pain 10/30/2020     Priority: Low    Malignant neoplasm of prostate ([facility] [address] 10/30/2020     Priority: Low    Type 2 diabetes mellitus without complications ([facility] [address] 49/06/9809     Priority: Low    History of radiation therapy 05/30/2019     Priority: Low    BMI 23.0-23.9, adult 01/09/2019     Priority: Low    Adenocarcinoma of prostate, stage 2 ([facility] [address] 01/03/2019     Priority: Low     Overview Note:     Formatting of this note might be different from the original.  Stage IIC (T2c, N0, cM0) [place] 8 Adenocarcinoma of the Prostate with Pre PSA of 7.6      Tobacco abuse 01/03/2019     Priority: Low    Diabetes mellitus ([facility] [address] 12/20/2018     Priority: Low    Family history of cancer 12/20/2018     Priority: Low    Essential hypertension 09/24/2018     Priority: Low    ESPINOSA (dyspnea on exertion) 09/24/2018     Priority: Low    History of diabetes mellitus 09/24/2018     Priority: Low    Diastolic dysfunction 60/59/6886     Priority: Low    Hypothyroidism 07/06/2018     Priority: Low       Admit Date:  11/4/2021    Admission Problem List: Present on Admission:   Acute decompensated heart failure [facility])      Cardiac Specific Data:  Specialty Problems        Cardiology Problems    Coronary artery disease        Hypercholesterolemia        Systolic congestive heart failure (HCC)        Ischemic cardiomyopathy        Essential hypertension        Acute decompensated heart failure (HCC)            1.  Ischemic cardiomyopathy, catheterization 12/11/2018 with occluded proximal to mid LAD with limited viability at apex (PET scan), severe mid circumflex and RPDA disease, ejection fraction 25%, being managed medically, possible LV apical layered thrombus and moderate to large pericardial effusion. 2.  New onset paroxysmal atrial fibrillation with acute on chronic heart failure presentation. 3.  Diabetes mellitus type 2.  3.  Hypertension. 4.  Prostate cancer treated with radiation therapy.     Subjective:  Mr. Tere Figueroa appears significantly better. Breathing is improved. No leg swelling. Remains in sinus rhythm. Net -790 cc overnight. On IV Lasix 40 mg twice daily. Renal function stable. Vital stable. Objective:   /64   Pulse 68   Temp 98.3 °F (36.8 °C) (Temporal)   Resp 16   Ht 5' 10\" (1.778 m)   Wt 155 lb 8 oz (70.5 kg)   SpO2 94%   BMI 22.31 kg/m²       Intake/Output Summary (Last 24 hours) at 11/7/2021 1521  Last data filed at 11/7/2021 1404  Gross per 24 hour   Intake 1070 ml   Output 2625 ml   Net -1555 ml       Prior to Admission medications    Medication Sig Start Date End Date Taking? Authorizing Provider   Multiple Vitamins-Minerals (COMPLETE SENIOR PO) Take by mouth   Yes Historical Provider, MD   doxyLAMINE succinate (SLEEP AID) 25 MG tablet Take 25 mg by mouth nightly   Yes Historical Provider, MD   gabapentin (NEURONTIN) 100 MG capsule Take 1 capsule by mouth 3 times daily for 30 days.  1/15/20 11/4/21 Yes LO Mullins   clopidogrel (PLAVIX) 75 MG tablet Take 1 tablet by mouth daily 6/27/19  Yes Jeovanny Tijerina MD   aspirin EC 81 MG EC tablet Take 1 tablet by mouth daily 6/27/19  Yes Jeovanny Tijerina MD   glipiZIDE (GLUCOTROL) 5 MG tablet Take 10 mg by mouth daily  9/14/18  Yes Historical Provider, MD   lisinopril (PRINIVIL;ZESTRIL) 10 MG tablet lisinopril 10 mg tablet  DAILY   Yes Historical Provider, MD        amiodarone  200 mg Oral BID    carvedilol  12.5 mg Oral BID    spironolactone  25 mg Oral Daily    enoxaparin  1 mg/kg SubCUTAneous BID    digoxin  125 mcg Oral Daily    furosemide  40 mg IntraVENous BID    aspirin EC  81 mg Oral Daily    atorvastatin  80 mg Oral Daily    clopidogrel  75 mg Oral Daily    gabapentin  100 mg Oral TID    insulin lispro  0-6 Units SubCUTAneous TID WC    insulin lispro  0-3 Units SubCUTAneous Nightly    lisinopril  10 mg Oral Daily    pantoprazole  40 mg Oral QAM AC       TELEMETRY: Sinus     Physical Exam:      Physical Exam  HENT:      Mouth/Throat:      Pharynx: No oropharyngeal exudate. Eyes:      General: No scleral icterus. Right eye: No discharge. Left eye: No discharge. Neck:      Thyroid: No thyromegaly. Vascular: No JVD. Cardiovascular:      Rate and Rhythm: Normal rate and regular rhythm. Heart sounds: No murmur heard. No friction rub. No gallop. Comments: No JVD  Trace edema    Pulmonary:      Effort: No respiratory distress. Breath sounds: No stridor. No wheezing or rales. Abdominal:      General: Bowel sounds are normal. There is no distension. Palpations: Abdomen is soft. There is no mass. Tenderness: There is no abdominal tenderness. There is no guarding or rebound. Comments: No palpable organomegaly   Musculoskeletal:         General: No deformity. Skin:     General: Skin is warm. Coloration: Skin is not pale. Findings: No erythema or rash. Neurological:      Mental Status: He is alert and oriented to person, place, and time. Motor: No abnormal muscle tone.       Coordination: Coordination normal.      Deep Tendon Reflexes: Reflexes normal.                 Lab Data:  CBC:   Recent Labs     11/05/21  0413 11/06/21  0813 11/07/21  0246   WBC 4.6* 4.1* 3.8*   HGB 11.6* 11.5* 11.2* HCT 34.7* 34.0* 33.3*   MCV 94.8* 93.2 92.2    138 144     BMP:   Recent Labs     11/06/21  0813 11/06/21  0932 11/07/21  0246    138 138   K 3.5 3.6 3.2*    102 101   CO2 23 24 27   BUN 10 10 10   CREATININE 0.6 0.6 0.7     LIVER PROFILE:   Recent Labs     11/05/21  0413 11/06/21  0813 11/07/21  0246   AST 11 13 12   ALT 15 13 14   BILITOT 0.5 0.7 0.5   ALKPHOS 68 61 63     PT/INR: No results for input(s): PROTIME, INR in the last 72 hours. APTT: No results for input(s): APTT in the last 72 hours. BNP:  No results for input(s): BNP in the last 72 hours. CK, CKMB, Troponin: @LABRCNT (CKTOTAL:3, CKMB:3, TROPONINI:3)@    IMAGING:  ECHO Complete 2D W Doppler W Color    Result Date: 11/5/2021  Transthoracic Echocardiography Report (TTE)  Demographics   Patient Name   TIAGO Flanagan. Date of Study           11/05/2021   MRN            [id number]           Gender                  Male   Date of Birth  1950       Room Number             MHL-0421   Age            66 year(s)   Height:        70 inches        Referring Physician     Anju Oreilly   Weight:        158 pounds       Sonographer             Grace Daniel RDCS   BSA:           1.89 m^2         Interpreting Physician  Bartolome Vengeas   BMI:           22.67 kg/m^2  Procedure Type of Study   TTE procedure:ECHO 2D W/DOPPLER/COLOR/CONTRAST. Study Location: Echo Lab Technical Quality: Limited visualization due to poor acoustical window. Patient Status: Inpatient Contrast Medium: Definity. Amount - 4 ml Rhythm: Atrial fibrillation BP: 104/60 mmHg Indications:Congestive heart failure. Conclusions   Summary  Mildly dilated left ventricular size with severely reduced LV function and  an estimated ejection fraction of approximately 25%. Severe diffuse global  hypokinesis. No evidence of left ventricular mass or thrombus noted. Normal right ventricular size with reduced RV function. Mild-moderate mitral regurgitation.   Mild tricuspid regurgitation with normal estimated RVSP. Aortic root and ascending aorta are within normal limits. Moderate pericardial effusion with echocardiographic evidence for early  tamponade physiology. Reduced SV and CO (SV 32 ml, SVI 17 ml/m2, CO 3.9 L/min, CI 2.0 L/min/m2)  Left pleural effusion is present. Findings discussed with charge nurse Kristi at the time of dictation. Signature   ----------------------------------------------------------------  Electronically signed by Sarah Eric(Interpreting physician)  on 11/05/2021 11:12 AM  ----------------------------------------------------------------   Findings   Mitral Valve  Structurally normal mitral valve with normal leaflet mobility. No evidence  of mitral valve stenosis. Mild-moderate mitral regurgitation (ERO 0.27 cm2, MR volume 34 ml, MR  fraction 20%). Aortic Valve  Aortic valve appears to be tricuspid. Mild sclerosis of the aortic valve. No significant aortic regurgitation or stenosis is noted. Tricuspid Valve  Tricuspid valve is structurally normal.  Mild tricuspid regurgitation with normal estimated RVSP. Pulmonic Valve  The pulmonic valve was not well visualized. Trace pulmonic regurgitation present. Left Atrium  Mildly dilated left atrium. Left Ventricle  Mildly dilated left ventricular size with severely reduced LV function and  an estimated ejection fraction of approximately 25%. Severe diffuse global  hypokinesis. No evidence of left ventricular mass or thrombus noted. Right Atrium  Normal right atrial dimension. Right Ventricle  Normal right ventricular size with reduced RV function. Pericardial Effusion  Moderate pericardial effusion with echocardiographic evidence for  hemodynamic significance. Pleural Effusion  Left pleural effusion is present. Miscellaneous  Aortic root and ascending aorta are within normal limits. Allergies   - Phenergan.  M-Mode Measurements (cm)   LVIDd: 5.25 cm                       LVIDs: 4.77 cm  IVSd: 1.19 cm  LVPWd: 1.2 cm                        AO Root Dimension: 3.7 cm  % Ejection Fraction: 23 %            LA: 4.2 cm                                       LVOT: 2.1 cm  Doppler Measurements:   AV Peak Velocity:99.2 cm/s           MV Peak E-Wave: 101 cm/s  AV Peak Gradient: 3.94 mmHg  AV Mean Gradient: 2 mmHg             MV Peak Gradient: 4.08 mmHg  AV Area (Continuity):1.92 cm^2       MV P1/2t: 23 msec  TR Velocity:270 cm/s                 MVA by PHT9.57 cm^2  TR Gradient:29.16 mmHg  Estimated RAP:3 mmHg  RVSP:32 mmHg      XR CHEST PORTABLE    Result Date: 11/4/2021  EXAMINATION: XR CHEST PORTABLE 11/4/2021 5:52 PM HISTORY: XR CHEST PORTABLE 11/4/2021 4:40 PM HISTORY: Short of air COMPARISON: November 26, 2018. FINDINGS: Right lung is clear. There is atelectasis left lower lobe. . Cardiac silhouettes mildly enlarged. . The osseous structures and surrounding soft tissues demonstrate no acute abnormality. 1. Mild cardiomegaly no evidence pulmonary vascular congestion. 2. Atelectasis left lower lobe. Signed by Dr Iris Medrano and Plan:    79-year-old gentleman with past medical history of diabetes mellitus type 2, hypertension, severe ischemic cardiomyopathy with ejection fraction 25%, known occluded LAD with extensive scar with limited apical viability, three-vessel disease managed medically, lost to follow-up presenting with acute on chronic systolic and diastolic heart failure in the setting of atrial fibrillation since converted to sinus with evidence of possible layered apical thrombus as well as moderate to large pericardial effusion. 1.  Clinically much improved. Renal function stable. Echocardiogram reviewed. Pericardial effusion appears moderate circumferential with slight decrease from 11/5/2021. We will switch to p.o. Lasix 40 mg twice daily. 2.  Continue heart failure management. Remains in sinus rhythm. Currently on amiodarone 200 twice daily.   Dose will need to be adjusted down to 200 p.o. daily. Will discontinue digoxin at this time. Maintain on lisinopril and carvedilol.     Reese Patricia MD, MD 11/7/2021 3:21 PM

## 2021-11-07 NOTE — PROGRESS NOTES
[facility]        Hospitalist Progress Note  11/7/2021 1:11 PM  Subjective:   Admit Date: 11/4/2021  PCP: LO Knowles CNP    Chief Complaint: Dyspnea    Subjective: Patient seen and examined at bedside. Breathing continues to improve. Denies chest pain. Comfortable. Cumulative Hospital History: 71-year-old male with a past medical history of CAD, prostate cancer, combined systolic and diastolic congestive heart failure, DM, tobacco abuse presenting to the hospital for dyspnea admitted for acute on chronic congestive heart failure. Rapid response called morning after admission for new onset atrial fibrillation with RVR with cardiology consulted. Echo showing moderate pericardial effusion. Medications adjusted with cardiology - currently in normal sinus rhythm. Appears to have possible layered thrombus at apex on TTE.    ROS: 14 point review of systems is negative except as specifically addressed above. ADULT DIET; Regular; 4 carb choices (60 gm/meal);  Low Fat/Low Chol/High Fiber/2 gm Na    Intake/Output Summary (Last 24 hours) at 11/7/2021 1311  Last data filed at 11/7/2021 1217  Gross per 24 hour   Intake 600 ml   Output 2625 ml   Net -2025 ml     Medications:   dextrose      dextrose       Current Facility-Administered Medications   Medication Dose Route Frequency Provider Last Rate Last Admin    potassium chloride (KLOR-CON M) extended release tablet 40 mEq  40 mEq Oral PRN Alberto Bowen MD   40 mEq at 11/07/21 0908    Or    potassium bicarb-citric acid (EFFER-K) effervescent tablet 40 mEq  40 mEq Oral PRN Alberto Bowen MD        Or    potassium chloride 10 mEq/100 mL IVPB (Peripheral Line)  10 mEq IntraVENous PRN Alberto Bowen MD        amiodarone (CORDARONE) tablet 200 mg  200 mg Oral BID Alberto Bowen MD   200 mg at 11/07/21 0908    carvedilol (COREG) tablet 12.5 mg  12.5 mg Oral BID Mak Diaz MD   12.5 mg at 11/07/21 0908    spironolactone (ALDACTONE) tablet 25 mg  25 mg Oral Daily Raguel Goodpasture, MD   25 mg at 11/07/21 0909    enoxaparin (LOVENOX) injection 70 mg  1 mg/kg SubCUTAneous BID Suad Hanks MD   70 mg at 11/07/21 0909    digoxin (LANOXIN) tablet 125 mcg  125 mcg Oral Daily Juliet Mcclendon MD   125 mcg at 11/07/21 0909    glucose (GLUTOSE) 40 % oral gel 15 g  15 g Oral PRN Teo Reyez MD        dextrose 50 % IV solution  12.5 g IntraVENous PRN Teo Reyez MD        glucagon (rDNA) injection 1 mg  1 mg IntraMUSCular PRN Teo Reyez MD        dextrose 5 % solution  100 mL/hr IntraVENous PRN Teo Reyez MD        furosemide (LASIX) injection 40 mg  40 mg IntraVENous BID Teo Reyez MD   40 mg at 11/07/21 0909    ondansetron (ZOFRAN) injection 4 mg  4 mg IntraVENous Q6H PRN Teo Reyez MD   4 mg at 11/05/21 1745    aspirin EC tablet 81 mg  81 mg Oral Daily Suad Hanks MD   81 mg at 11/07/21 0908    atorvastatin (LIPITOR) tablet 80 mg  80 mg Oral Daily Suad Hanks MD   80 mg at 11/07/21 0908    clopidogrel (PLAVIX) tablet 75 mg  75 mg Oral Daily Suad Hanks MD   75 mg at 11/07/21 0908    gabapentin (NEURONTIN) capsule 100 mg  100 mg Oral TID Suad Hanks MD   100 mg at 11/07/21 0908    insulin lispro (HUMALOG) injection vial 0-6 Units  0-6 Units SubCUTAneous TID WC Suad Hanks MD   1 Units at 11/07/21 1216    insulin lispro (HUMALOG) injection vial 0-3 Units  0-3 Units SubCUTAneous Nightly Suad Hanks MD   1 Units at 11/06/21 2107    lisinopril (PRINIVIL;ZESTRIL) tablet 10 mg  10 mg Oral Daily Suad Hanks MD   10 mg at 11/07/21 0909    pantoprazole (PROTONIX) tablet 40 mg  40 mg Oral QAM AC Suad Hanks MD   40 mg at 11/07/21 0533    acetaminophen (TYLENOL) tablet 650 mg  650 mg Oral Q6H PRN Suad Hanks MD        Or    acetaminophen (TYLENOL) suppository 650 mg  650 mg Rectal Q6H PRN Suad Hanks MD        glucose (GLUTOSE) 40 % oral gel 15 g  15 g Oral PRN Suad Hanks MD        dextrose 50 % IV solution  12.5 g IntraVENous PRN Eleonora Farris MD        glucagon (rDNA) injection 1 mg  1 mg IntraMUSCular PRN Eleonora Farris MD        dextrose 5 % solution  100 mL/hr IntraVENous PRN Eleonora Farris MD            Labs:     Recent Labs     11/05/21 0413 11/06/21  0813 11/07/21  0246   WBC 4.6* 4.1* 3.8*   RBC 3.66* 3.65* 3.61*   HGB 11.6* 11.5* 11.2*   HCT 34.7* 34.0* 33.3*   MCV 94.8* 93.2 92.2   MCH 31.7* 31.5* 31.0   MCHC 33.4 33.8 33.6    138 144     Recent Labs     11/06/21  0813 11/06/21  0932 11/07/21  0246    138 138   K 3.5 3.6 3.2*   ANIONGAP 13 12 10    102 101   CO2 23 24 27   BUN 10 10 10   CREATININE 0.6 0.6 0.7   GLUCOSE 105 165* 106   CALCIUM 8.3* 8.7* 8.4*     Recent Labs     11/05/21  0413 11/06/21  0813 11/07/21  0246   MG 1.9 1.9 1.9     Recent Labs     11/05/21  0413 11/06/21  0813 11/07/21  0246   AST 11 13 12   ALT 15 13 14   BILITOT 0.5 0.7 0.5   ALKPHOS 68 61 63     ABGs:No results for input(s): PH, PO2, PCO2, HCO3, BE, O2SAT in the last 72 hours. Troponin T:   Recent Labs     11/04/21  1620 11/04/21  2234 11/05/21 0413   TROPONINI 0.02 0.03 0.03     INR: No results for input(s): INR in the last 72 hours. Lactic Acid:   Recent Labs     11/04/21  1620   LACTA 1.2       Objective:   Vitals: /64   Pulse 68   Temp 98.3 °F (36.8 °C) (Temporal)   Resp 16   Ht 5' 10\" (1.778 m)   Wt 155 lb 8 oz (70.5 kg)   SpO2 94%   BMI 22.31 kg/m²   24HR INTAKE/OUTPUT:      Intake/Output Summary (Last 24 hours) at 11/7/2021 1311  Last data filed at 11/7/2021 1217  Gross per 24 hour   Intake 600 ml   Output 2625 ml   Net -2025 ml     General appearance: Alert  HEENT: AT/NC  Lungs: BLAE, no wheezing appreciated  Heart: RRR, +murmur  Abdomen: BS+, soft, NT, ND  Extremities: +LE edema, pulses+  Neurologic: Alert, gross motor function intact  Skin: warm      Assessment and Plan:    Active Problems:    Acute decompensated heart failure ([address]  Resolved Problems:    * No resolved hospital problems. *    CHF/Pericardial effusion/[place] Thrombus: Diuretics. Monitor I's and O's. Repeat TTE. Cardiology on board. Afib: RC/AC. Telemetry. Now on NSR.    CAD: Aspirin/Statin. DM: Monitor BG and adjust medications PRN.     Advance Directive: Full Code    DVT prophylaxis: Therapeutic Lovenox    Discharge planning: TBD      Signed:  Ysabel Pitts MD 11/7/2021 1:11 PM  Rounding Hospitalist

## 2021-11-07 NOTE — PLAN OF CARE
Problem: Falls - Risk of:  Goal: Will remain free from falls  Description: Will remain free from falls  11/7/2021 1337 by Shahid Ashby  Outcome: Ongoing     Problem: Falls - Risk of:  Goal: Absence of physical injury  Description: Absence of physical injury  11/7/2021 1337 by Shahid Ashby  Outcome: Ongoing

## 2021-11-08 NOTE — DISCHARGE SUMMARY
Admission Diagnosis:  Chief Complaint   Patient presents with    Shortness of Breath     worse with position       Comorbid conditions: Active Problems:    Acute decompensated heart failure ([address]  Resolved Problems:    * No resolved hospital problems. *      Procedures Performed:  None    Hospital Course:  67-year-old male with a past medical history of CAD, prostate cancer, combined systolic and diastolic congestive heart failure, DM, tobacco abuse presenting to the hospital for dyspnea admitted for acute on chronic congestive heart failure. Rapid response called morning after admission for new onset atrial fibrillation with RVR with cardiology consulted. Echo showing moderate pericardial effusion. Medications adjusted with cardiology - currently in normal sinus rhythm. Appears to have possible layered thrombus at apex on TTE. Cardiology saw the patient and cleared for DC home. Pt states he feels good and wishes to go home. Did mention his feet were purpleish, but states that is not much different from his baseline. Denies LE pain. Does not want further workup. Wishes to go home. High risk patient, defer further management to cardiology. Pt wants no further workup at this time just wants to go home. Eliquis 5mg BID per cardiology.       DC Medications:  See Home medication reconciliation    Condition on Discharge:  Stable    Disposition:  DC home    Recommendations/Follow-up:  -F/u PCP within 3 days  F/u cardiology    Tan Gomez M.D.,  11/8/2021

## 2021-11-08 NOTE — PROGRESS NOTES
Cardiology Daily Note Nate Paul MD      Patient:  Nafisa Suarez  [id number]    Patient Active Problem List    Diagnosis Date Noted    Blurred vision, right eye 03/28/2019    Ischemic cardiomyopathy 01/17/2019    Coronary artery disease 12/17/2018    Hypercholesterolemia 03/05/3163    Systolic congestive heart failure ([address] 12/17/2018    Abnormal nuclear stress test 11/26/2018    Acute decompensated heart failure ([address] 11/04/2021    Gastro-esophageal reflux disease without esophagitis 10/30/2020    Low back pain 10/30/2020    Malignant neoplasm of prostate ([address] 10/30/2020    Type 2 diabetes mellitus without complications ([address] 66/01/6315    History of radiation therapy 05/30/2019    BMI 23.0-23.9, adult 01/09/2019    Adenocarcinoma of prostate, stage 2 ([address] 01/03/2019    Tobacco abuse 01/03/2019    Diabetes mellitus ([address] 12/20/2018    Family history of cancer 12/20/2018    Essential hypertension 09/24/2018    ESPINOSA (dyspnea on exertion) 09/24/2018    History of diabetes mellitus 18/46/3743    Diastolic dysfunction 56/50/1128    Hypothyroidism 07/06/2018       Admit Date:  11/4/2021    Admission Problem List: Present on Admission:   Acute decompensated heart failure [facility])      Cardiac Specific Data:  Specialty Problems        Cardiology Problems    Coronary artery disease        Hypercholesterolemia        Systolic congestive heart failure (HCC)        Ischemic cardiomyopathy        Essential hypertension        Acute decompensated heart failure ([address]              Subjective:  Mr. Dallas Lam seen today events over the weekend reviewed much improved now back in sinus rhythm blood pressure 126/75 heart rate 64. Dyspnea on back to baseline denies chest pain. Echocardiogram yesterday showed slight improvement in the size of the pericardial effusion.     Objective:   /75   Pulse 64   Temp 96.9 °F (36.1 °C) (Temporal)   Resp 18   Ht 5' 10\" (1.778 m)   Wt 148 lb 7 oz (67.3 kg)   SpO2 93% BMI 21.30 kg/m²       Intake/Output Summary (Last 24 hours) at 11/8/2021 1106  Last data filed at 11/8/2021 1001  Gross per 24 hour   Intake 1170 ml   Output 2300 ml   Net -1130 ml       Prior to Admission medications    Medication Sig Start Date End Date Taking? Authorizing Provider   Multiple Vitamins-Minerals (COMPLETE SENIOR PO) Take by mouth   Yes Historical Provider, MD   doxyLAMINE succinate (SLEEP AID) 25 MG tablet Take 25 mg by mouth nightly   Yes Historical Provider, MD   gabapentin (NEURONTIN) 100 MG capsule Take 1 capsule by mouth 3 times daily for 30 days.  1/15/20 11/4/21 Yes LO Mullins   clopidogrel (PLAVIX) 75 MG tablet Take 1 tablet by mouth daily 6/27/19  Yes Jeovanny Tijerina MD   aspirin EC 81 MG EC tablet Take 1 tablet by mouth daily 6/27/19  Yes Jeovanny Tijerina MD   glipiZIDE (GLUCOTROL) 5 MG tablet Take 10 mg by mouth daily  9/14/18  Yes Historical Provider, MD   lisinopril (PRINIVIL;ZESTRIL) 10 MG tablet lisinopril 10 mg tablet  DAILY   Yes Historical Provider, MD        apixaban  5 mg Oral BID    furosemide  40 mg Oral BID    amiodarone  200 mg Oral BID    carvedilol  12.5 mg Oral BID    spironolactone  25 mg Oral Daily    aspirin EC  81 mg Oral Daily    atorvastatin  80 mg Oral Daily    clopidogrel  75 mg Oral Daily    gabapentin  100 mg Oral TID    insulin lispro  0-6 Units SubCUTAneous TID WC    insulin lispro  0-3 Units SubCUTAneous Nightly    lisinopril  10 mg Oral Daily    pantoprazole  40 mg Oral QAM AC       TELEMETRY: Sinus     Physical Exam:      Physical Exam      General:  Awake, alert, NAD  Skin:  Warm and dry  Neck:  no jvd , no carotid bruits  Chest:  Clear to auscultation, no wheezing or rales  Cardiovascular:  RRR Y3I1 no murmurs, clicks, gallups, or rubs  Abdomen:  Soft nontender, nondistended, bowel sounds present  Extremities:  Edema: none      Lab Data:  CBC:   Recent Labs     11/06/21  0813 11/07/21  0246 11/08/21  0248   WBC 4.1* 3.8* 3.7*   HGB 11.5* 11.2* 11.9*   HCT 34.0* 33.3* 34.2*   MCV 93.2 92.2 91.4    144 147     BMP:   Recent Labs     11/06/21  0932 11/07/21 0246 11/08/21 0248    138 137   K 3.6 3.2* 3.5    101 100   CO2 24 27 26   BUN 10 10 13   CREATININE 0.6 0.7 0.8     LIVER PROFILE:   Recent Labs     11/06/21  0813 11/07/21 0246 11/08/21 0248   AST 13 12 15   ALT 13 14 16   BILITOT 0.7 0.5 0.7   ALKPHOS 61 63 63     PT/INR: No results for input(s): PROTIME, INR in the last 72 hours. APTT: No results for input(s): APTT in the last 72 hours. BNP:  No results for input(s): BNP in the last 72 hours. CK, CKMB, Troponin: @LABRCNT (CKTOTAL:3, CKMB:3, TROPONINI:3)@    IMAGING:  ECHO Complete 2D W Doppler W Color    Result Date: 11/5/2021  Transthoracic Echocardiography Report (TTE)  Demographics   Patient Name   TIAGO Vela. Date of Study           11/05/2021   MRN            [id number]           Gender                  Male   Date of Birth  1950       Room Number             MHL-0421   Age            66 year(s)   Height:        70 inches        Referring Physician     Maricarmen Jasso   Weight:        158 pounds       Sonographer             Grace Daniel RDCS   BSA:           1.89 m^2         Interpreting Physician  Beatriz Tian   BMI:           22.67 kg/m^2  Procedure Type of Study   TTE procedure:ECHO 2D W/DOPPLER/COLOR/CONTRAST. Study Location: Echo Lab Technical Quality: Limited visualization due to poor acoustical window. Patient Status: Inpatient Contrast Medium: Definity. Amount - 4 ml Rhythm: Atrial fibrillation BP: 104/60 mmHg Indications:Congestive heart failure. Conclusions   Summary  Mildly dilated left ventricular size with severely reduced LV function and  an estimated ejection fraction of approximately 25%. Severe diffuse global  hypokinesis. No evidence of left ventricular mass or thrombus noted. Normal right ventricular size with reduced RV function. Mild-moderate mitral regurgitation. Mild tricuspid regurgitation with normal estimated RVSP. Aortic root and ascending aorta are within normal limits. Moderate pericardial effusion with echocardiographic evidence for early  tamponade physiology. Reduced SV and CO (SV 32 ml, SVI 17 ml/m2, CO 3.9 L/min, CI 2.0 L/min/m2)  Left pleural effusion is present. Findings discussed with charge nurse Kristi at the time of dictation. Signature   ----------------------------------------------------------------  Electronically signed by Maureen Eric(Interpreting physician)  on 11/05/2021 11:12 AM  ----------------------------------------------------------------   Findings   Mitral Valve  Structurally normal mitral valve with normal leaflet mobility. No evidence  of mitral valve stenosis. Mild-moderate mitral regurgitation (ERO 0.27 cm2, MR volume 34 ml, MR  fraction 20%). Aortic Valve  Aortic valve appears to be tricuspid. Mild sclerosis of the aortic valve. No significant aortic regurgitation or stenosis is noted. Tricuspid Valve  Tricuspid valve is structurally normal.  Mild tricuspid regurgitation with normal estimated RVSP. Pulmonic Valve  The pulmonic valve was not well visualized. Trace pulmonic regurgitation present. Left Atrium  Mildly dilated left atrium. Left Ventricle  Mildly dilated left ventricular size with severely reduced LV function and  an estimated ejection fraction of approximately 25%. Severe diffuse global  hypokinesis. No evidence of left ventricular mass or thrombus noted. Right Atrium  Normal right atrial dimension. Right Ventricle  Normal right ventricular size with reduced RV function. Pericardial Effusion  Moderate pericardial effusion with echocardiographic evidence for  hemodynamic significance. Pleural Effusion  Left pleural effusion is present. Miscellaneous  Aortic root and ascending aorta are within normal limits. Allergies   - Phenergan.  M-Mode Measurements (cm)   LVIDd: 5.25 cm echocardiographic study to evaluate pericardial effusion. Moderate size circumferential pericardial effusion that appears  predominantly inferiorly overlying right atrium and right ventricle. Size appears slightly less from 11/5/2021. Decreased respiratory involution of right atrium in diastole  comparatively. Not meeting criteria for tamponade physiology by echocardiography. Moderate to severe LV dysfunction with left atrial enlargement noted. Signature   ----------------------------------------------------------------  Electronically signed by Steve Benítez MD(Interpreting physician)  on 11/07/2021 03:27 PM  ----------------------------------------------------------------  Allergies   - Phenergan. Assessment:  1. Atrial fibrillation rapid ventricular response rate now resolved back in sinus rhythm  2. Complaints of increased shortness of breath proved  3. Coronary artery disease  4. Hyperlipidemia  5. Acute and chronic systolic and diastolic congestive heart failure  6. Ischemic cardiomyopathy  7. Diabetes mellitus type 2  8. Hypothyroidism  9. MRI of the brain 4/9/2021 no acute abnormalities mild generalized cerebral volume loss and mild chronic microvascular changes  10. CT abdomen pelvis 2/17/2020 unchanged from 1-1/2 years ago no acute findings  11. Lexiscan 11/20/2018 EF 30% moderately severe global hypokinesis apical akinesis or dyskinesis large extensive infarct or scar in the mid to distal anterior anteroseptal and anteroapical region no ischemia identified  12. Carotid duplex 3/28/2019 less than 50% stenosis internal carotid arteries bilaterally vertebral flow antegrade bilaterally  13. Cardiac cath 12/4/2018 moderately impaired LV systolic function severe multivessel coronary disease 100% mid LAD  fills by collaterals right coronary artery  14. Echo 8/28/2018 mild concentric LVH EF 35 to 07% grade 1 diastolic dysfunction mild MR enlarged left atrium  15.  Echocardiogram today ejection

## 2021-11-08 NOTE — PLAN OF CARE
Problem: Falls - Risk of:  Goal: Will remain free from falls  Description: Will remain free from falls  11/8/2021 0036 by Davie Recinos RN  Outcome: Ongoing  11/7/2021 1430 by Shahid Ashby  Outcome: Ongoing  11/7/2021 1337 by Shahid Ashby  Outcome: Ongoing  Goal: Absence of physical injury  Description: Absence of physical injury  11/8/2021 0036 by Davie Recinos RN  Outcome: Ongoing  11/7/2021 1430 by Shahid Ashby  Outcome: Ongoing  11/7/2021 1337 by Shahid Ashby  Outcome: Ongoing   Electronically signed by Davie Recinos RN on 11/8/2021 at 12:36 AM

## 2021-11-22 NOTE — TELEPHONE ENCOUNTER
Patient said that the pharmacy sent a request for a refill for Eliquis  5 mg , patient will be out of his Eliquis today ,please send refill to [facility] pharmacy in [facility]

## 2021-12-02 PROBLEM — Z79.01 CHRONIC ANTICOAGULATION: Status: ACTIVE | Noted: 2021-01-01

## 2021-12-02 PROBLEM — I48.0 PAROXYSMAL ATRIAL FIBRILLATION (HCC): Status: ACTIVE | Noted: 2021-01-01

## 2021-12-02 NOTE — PROGRESS NOTES
results for input(s): AST, ALT, LABALBU in the last 72 hours. Past Medical History:   Diagnosis Date    Arthritis     Asthma     Cancer ([facility] 08/2018    Prostrate.  Coronary artery disease 12/17/2018    Diabetes mellitus ([facility]     Hypercholesterolemia 12/17/2018    Hypertension     Hypothyroidism 7/6/2018    Rheumatic fever     as child    Systolic congestive heart failure ([facility] 12/17/2018     Past Surgical History:   Procedure Laterality Date    APPENDECTOMY      CHOLECYSTECTOMY      PANCREAS SURGERY      STENT PLACED IN BILE DUCT     Family History   Problem Relation Age of Onset    Cancer Mother     Tuberculosis Father      Allergies   Allergen Reactions    Phenergan [Promethazine Hcl]      Current Outpatient Medications   Medication Sig Dispense Refill    SITagliptin (JANUVIA) 50 MG tablet Take 25 mg by mouth daily      apixaban (ELIQUIS) 5 MG TABS tablet Take 1 tablet by mouth 2 times daily 30 tablet 0    Multiple Vitamins-Minerals (COMPLETE SENIOR PO) Take by mouth      doxyLAMINE succinate (SLEEP AID) 25 MG tablet Take 25 mg by mouth nightly      clopidogrel (PLAVIX) 75 MG tablet Take 1 tablet by mouth daily 30 tablet 5    aspirin EC 81 MG EC tablet Take 1 tablet by mouth daily 30 tablet 5    lisinopril (PRINIVIL;ZESTRIL) 10 MG tablet lisinopril 10 mg tablet  DAILY      gabapentin (NEURONTIN) 100 MG capsule Take 1 capsule by mouth 3 times daily for 30 days. (Patient taking differently: Take 600 mg by mouth 3 times daily. ) 90 capsule 0     No current facility-administered medications for this visit.      Social History     Socioeconomic History    Marital status:      Spouse name: Not on file    Number of children: Not on file    Years of education: Not on file    Highest education level: Not on file   Occupational History    Not on file   Tobacco Use    Smoking status: Never Smoker    Smokeless tobacco: Current User     Types: Chew   Vaping Use    Vaping Use: effort is normal. No respiratory distress. Breath sounds: Normal breath sounds. No wheezing or rales. Abdominal:      General: There is no distension. Tenderness: There is no abdominal tenderness. Lymphadenopathy:      Cervical: No cervical adenopathy. Skin:     General: Skin is warm and dry. ASSESSMENT:     Diagnosis Orders   1. Ischemic cardiomyopathy     2. Coronary artery disease involving native coronary artery of native heart without angina pectoris     3. Systolic congestive heart failure, unspecified HF chronicity ([address]     4. Essential hypertension     5. ESPINOSA (dyspnea on exertion)     6. Paroxysmal atrial fibrillation (HCC)     7. Chronic anticoagulation         PLAN:  No orders of the defined types were placed in this encounter. No orders of the defined types were placed in this encounter. 1. Continue present medications  2. Recommend cardiac event monitor  3. Recommend follow-up assessment in 3 months  4. Suggest Coreg 3.125 mg p.o. twice daily  5. Discussed with the patient he clearly would benefit from ICD implantation after discussion they are agreeable to proceeding we will arrange as soon as feasible.   I have discussed with the patient regarding indications for the proposed procedure ICD/ Pacemaker implantation along with possible alternatives benefits and risks including but not limited to risks of death, myocardial infarction, stroke, contrast induced nephropathy which in some cases may lead to acute kidney failure requiring dialysis, allergic reactions, infections which may require treatment with antibiotics or removal of the device, bleeding requiring blood transfusion,  cardiac arrhthymias, respiratory failure which may require placing the patient on respiratory support such as a ventilator or breathing machine,risk of complications which may require vascular surgery,risks of collapsing the lung with development of a pneumothorax which may require placement of a chest tube, and risks of lead dislodgement which may require follow up surgery and repositioning of the leads. In addition there are long term risks including infection, and device or component failure which may require removal and/or replacement of various components. Risk of wound nonhealing t subsequent erosion etc. also discussed. We also discussed the risk of potential stroke or thromboembolic phenomena during the timeframe that the patient may be off off of anticoagulation. The patient is advised the device battery will eventually become depleted and require replacement at some point. The patient is awake and alert and understands the issues involved and indicates willingness to proceed as ordered. The patient is  a reasonable candidate for moderate conscious sedation. ASA score:  ASA 3 - Patient with moderate systemic disease with functional limitations    Mallampati: I (soft palate, uvula, fauces, tonsillar pillars visible)    Preferred vascular access site will be: left subclavian vein. Return in about 3 months (around 3/2/2022) for return to Dr. Nikolas Quezada only. Jeff Boyle MD 12/2/2021 1:29 PM [facility]      Thisdictation was generated by voice recognition computer software. Although all attempts are made to edit the dictation for accuracy, there may be errors in the transcription that are not intended.

## 2021-12-02 NOTE — PATIENT INSTRUCTIONS
[place] at the [address] [facility] and [address] [facility] located on the first floor of [place] [address] through hospital main entrance and turn immediately to the left. Patient's contact number:  [phone] (home)     Date- 12/29/21 arrive at 7AM for 9AM procedure    Implantable Cardioverter-Defibrillator [overnight stay]    An implantable cardioverter-defibrillator, or ICD, is a small electronic device designed to treat dangerous tachycardias. The ICD monitors the heart rhythm at all times. If it senses a dangerous tachycardia, the ICD delivers one or more impulses or shocks to the heart and restores a normal rhythm. Prep Instructions:    1. Do not eat or drink anything after midnight the night before your procedure. May take morning medications with a sip of water unless otherwise directed not to.  2.  You should arrange to have someone take you home rather than drive yourself. 3.  Further plans will be made following your procedure. 4.  Stop Eliquis two days prior to procedure. If for any reason you are unable to keep this appointment, please contact [facility], [phone], as soon as possible to reschedule.

## 2021-12-06 NOTE — TELEPHONE ENCOUNTER
Patient daughter called stating patient went info afib over the weekend with rates as high as 160-170. Called 911 and ambulance come out to his home and said since he is not having any pain just SOB that there was no need to take him to the ED to follow up with cardiologist on Monday AM. He is very SOB and having to use his wife's oxygen. Patient is taking all his meds as prescribed and has not missed any doses. Will talk with KATHY to see what he wants to do. Patient does have ICD implant coming up within the next few weeks.

## 2021-12-06 NOTE — TELEPHONE ENCOUNTER
Talked with Dr. Yasmany Singer said we could set up for a DCCV. Called and talked with Rashida Hall-- Patient is scheduled for 12/7/21 at 9 arrival for 11 procedure. Covid swab is not required because he is fully vaccinated. Other instructions given below. [place] at [facility] and [address] [facility] located on the first floor of [address] is a procedure that uses an electrical current to help normalize the heart rhythm. You will be coming into our facility as an outpatient and will go home following this procedure. Cardioversion Instructions:  · Do not eat or drink anything after midnight the night before your procedure. May take morning medications with a sip of water unless otherwise directed not to. · You should arrange to have someone take you home rather than drive yourself. · Further plans will depend upon the result of your procedure. If for any reason you are unable to keep this appointment, please contact [facility], [phone], as soon as possible to reschedule.

## 2021-12-07 PROBLEM — I48.91 A-FIB (HCC): Status: ACTIVE | Noted: 2021-01-01

## 2021-12-07 NOTE — H&P
Office Visit    12/2/2021  [facility] MD    Interventional Cardiology  Paroxysmal atrial fibrillation [facility]) +6 more    Dx  Follow-Up from Hospital    Reason for Visit       Progress Notes  Robin Pierre MD (Physician) Aayush Chen Interventional Cardiology  Expand All Collapse All  [facility] Progress Note                              Date:                12/2/2021  Patient:            Tom Mcarthur. Age:                 66 y. o., 1950        Reason for evaluation:            SUBJECTIVE:    Returns today for follow-up assessment. Recently hospitalized with atrial fibrillation a few weeks ago converted spontaneously. Echocardiogram shows moderate to severe LV dysfunction. Denies awareness of palpitations since that time. Denies chest pain dyspnea stable denies any bleeding issues no other complaints or issues reported. Previous ejection fraction by cath in 2018 was 30%. Blood pressure today 120/76 heart rate 87. EKG tracing today shows sinus rhythm old anterior infarct nonspecific ST-T abnormalities.     Review of Systems   Constitutional: Negative. Negative for chills, fever and unexpected weight change. HENT: Negative. Eyes: Negative. Respiratory: Negative. Negative for shortness of breath. Cardiovascular: Negative. Negative for chest pain. Gastrointestinal: Negative. Negative for diarrhea, nausea and vomiting. Endocrine: Negative. Genitourinary: Negative. Musculoskeletal: Negative. Skin: Negative. Neurological: Negative. All other systems reviewed and are negative.           OBJECTIVE:     /76   Pulse 87   Ht 5' 10\" (1.778 m)   Wt 148 lb (67.1 kg)   BMI 21.24 kg/m²      Labs:   CBC: No results for input(s): WBC, HGB, HCT, PLT in the last 72 hours. BMP:No results for input(s): NA, K, CO2, BUN, CREATININE, LABGLOM, GLUCOSE in the last 72 hours.   BNP: No results for input(s): BNP in the last 72 hours.  PT/INR: No results for input(s): PROTIME, INR in the last 72 hours. APTT:No results for input(s): APTT in the last 72 hours. CARDIAC ENZYMES:No results for input(s): CKTOTAL, CKMB, CKMBINDEX, TROPONINI in the last 72 hours. FASTING LIPID PANEL:        Lab Results   Component Value Date     HDL 51 02/18/2019     LDLCALC 66 02/18/2019     TRIG 81 02/18/2019      LIVER PROFILE:No results for input(s): AST, ALT, LABALBU in the last 72 hours.         Past Medical History        Past Medical History:   Diagnosis Date    Arthritis      Asthma      [facility] 08/2018     Prostrate.      Coronary artery disease 12/17/2018    Diabetes mellitus ([facility]      Hypercholesterolemia 12/17/2018    Hypertension      Hypothyroidism 7/6/2018    Rheumatic fever       as child    Systolic congestive heart failure ([facility] 12/17/2018         Past Surgical History         Past Surgical History:   Procedure Laterality Date    APPENDECTOMY        CHOLECYSTECTOMY        PANCREAS SURGERY         STENT PLACED IN BILE DUCT         Family History         Family History   Problem Relation Age of Onset    Cancer Mother      Tuberculosis Father                Allergies   Allergen Reactions    Phenergan [Promethazine Hcl]        Current Facility-Administered Medications          Current Outpatient Medications   Medication Sig Dispense Refill    SITagliptin (JANUVIA) 50 MG tablet Take 25 mg by mouth daily        apixaban (ELIQUIS) 5 MG TABS tablet Take 1 tablet by mouth 2 times daily 30 tablet 0    Multiple Vitamins-Minerals (COMPLETE SENIOR PO) Take by mouth        doxyLAMINE succinate (SLEEP AID) 25 MG tablet Take 25 mg by mouth nightly        clopidogrel (PLAVIX) 75 MG tablet Take 1 tablet by mouth daily 30 tablet 5    aspirin EC 81 MG EC tablet Take 1 tablet by mouth daily 30 tablet 5    lisinopril (PRINIVIL;ZESTRIL) 10 MG tablet lisinopril 10 mg tablet  DAILY        gabapentin (NEURONTIN) 100 MG capsule Take 1 Housing in the Last Year: Not on file    Number of Places Lived in the Last Year: Not on file    Unstable Housing in the Last Year: Not on file            Physical Examination:  /76   Pulse 87   Ht 5' 10\" (1.778 m)   Wt 148 lb (67.1 kg)   BMI 21.24 kg/m²   Physical Exam  Vitals reviewed. Constitutional:       Appearance: He is well-developed. Neck:      Vascular: No carotid bruit or JVD. Cardiovascular:      Rate and Rhythm: Normal rate and regular rhythm. Heart sounds: Normal heart sounds. No murmur heard. No friction rub. No gallop. Pulmonary:      Effort: Pulmonary effort is normal. No respiratory distress. Breath sounds: Normal breath sounds. No wheezing or rales. Abdominal:      General: There is no distension. Tenderness: There is no abdominal tenderness. Lymphadenopathy:      Cervical: No cervical adenopathy. Skin:     General: Skin is warm and dry.                ASSESSMENT:       Diagnosis Orders   1. Ischemic cardiomyopathy      2. Coronary artery disease involving native coronary artery of native heart without angina pectoris      3. Systolic congestive heart failure, unspecified HF chronicity (HCC)      4. Essential hypertension      5. ESPINOSA (dyspnea on exertion)      6. Paroxysmal atrial fibrillation (HCC)      7. Chronic anticoagulation            PLAN:  No orders of the defined types were placed in this encounter.       Encounter Medications    No orders of the defined types were placed in this encounter.            1. Continue present medications  2. Recommend cardiac event monitor  3. Recommend follow-up assessment in 3 months  4. Suggest Coreg 3.125 mg p.o. twice daily  5. Discussed with the patient he clearly would benefit from ICD implantation after discussion they are agreeable to proceeding we will arrange as soon as feasible.   I have discussed with the patient regarding indications for the proposed procedure ICD/ Pacemaker implantation along with possible alternatives benefits and risks including but not limited to risks of death, myocardial infarction, stroke, contrast induced nephropathy which in some cases may lead to acute kidney failure requiring dialysis, allergic reactions, infections which may require treatment with antibiotics or removal of the device, bleeding requiring blood transfusion,  cardiac arrhthymias, respiratory failure which may require placing the patient on respiratory support such as a ventilator or breathing machine,risk of complications which may require vascular surgery,risks of collapsing the lung with development of a pneumothorax which may require placement of a chest tube, and risks of lead dislodgement which may require follow up surgery and repositioning of the leads. In addition there are long term risks including infection, and device or component failure which may require removal and/or replacement of various components. Risk of wound nonhealing t subsequent erosion etc. also discussed. We also discussed the risk of potential stroke or thromboembolic phenomena during the timeframe that the patient may be off off of anticoagulation. The patient is advised the device battery will eventually become depleted and require replacement at some point. The patient is awake and alert and understands the issues involved and indicates willingness to proceed as ordered.     The patient is  a reasonable candidate for moderate conscious sedation.     ASA score:  ASA 3 - Patient with moderate systemic disease with functional limitations     Mallampati: I (soft palate, uvula, fauces, tonsillar pillars visible)     Preferred vascular access site will be: left subclavian vein.           Return in about 3 months (around 3/2/2022) for return to Dr. Andrew Skinner only.        Audie Morgan MD 12/2/2021 1:29 PM [address] [facility]        Thisdictation was generated by voice recognition computer software.   Although all attempts are made to edit the dictation for accuracy, there may be errors in the transcription that are not intended. Developed atrial fibrillation in the last 24 to 48 hours symptomatic short of breath now here for elective cardioversion advise indication alternatives benefits and risk patient agreeable plan today. ASA score: 3  Airway score:  Mallampati class I  Patient is acceptable candidate for moderate IV conscious sedation

## 2021-12-07 NOTE — FLOWSHEET NOTE
Pt came to  Hospital for cardioversion today, and patient was found to be in NSR per 12 lead EKG. Dr. Yasmany Singer came into room to talk with patient, and daughter. Lab was ordered and drawn per . Awaiting results so Dr. Yasmany Singer can make a decision of future  Meds. Pt also has on a Zio patch.

## 2021-12-07 NOTE — FLOWSHEET NOTE
Pt's Oxygen level has been low at 87 and 88 oxygen, so NC at 2 l/min per NC started to help increase his O2 SAT.

## 2021-12-07 NOTE — H&P
[facility]  History and Physical    Patient:  Tim Sebastian. MRN: [id number]    CHIEF COMPLAINT:  No chief complaint on file. History Obtained From:     PCP: LO Eduardo CNP    HISTORY OF PRESENT ILLNESS:   66 y.o. male who presented for elective cardioversion as apparently he developed rapid heart rate and increased shortness of breath on Saturday this past weekend. He was noted to be in atrial fibrillation and after discussion arrangements were made for him to be admitted for elective cardioversion patient on chronic anticoagulation at home he has known multivessel coronary disease and ischemic cardiomyopathy. When he showed up earlier this morning his EKG however showed that he was in sinus rhythm. proBNP elevated 2777. After discussion felt that we should admit him for further treatment. Patient and family agreeable. REVIEW OF SYSTEMS:  Review of Systems   Constitutional: Negative. Negative for chills, fever and unexpected weight change. HENT: Negative. Eyes: Negative. Respiratory: Negative. Negative for shortness of breath. Cardiovascular: Negative. Negative for chest pain. Gastrointestinal: Negative. Negative for diarrhea, nausea and vomiting. Endocrine: Negative. Genitourinary: Negative. Musculoskeletal: Negative. Skin: Negative. Neurological: Negative. All other systems reviewed and are negative. Cardiac Specific Data:   Specialty Problems        Cardiology Problems    Coronary artery disease        Hypercholesterolemia        Systolic congestive heart failure (HCC)        Ischemic cardiomyopathy        Paroxysmal atrial fibrillation (HCC)        A-fib (HCC)        Essential hypertension        Acute decompensated heart failure ([address]              Past Medical History:      Diagnosis Date    Arthritis     Asthma     Cancer ([facility] 08/2018    Prostrate.      Coronary artery disease 12/17/2018    Diabetes mellitus ([address]     Hypercholesterolemia 12/17/2018    Hypertension     Hypothyroidism 7/6/2018    Pancreatitis     Rheumatic fever     as child    Systolic congestive heart failure ([facility] 12/17/2018       Past Surgical History:      Procedure Laterality Date    APPENDECTOMY      CHOLECYSTECTOMY      PANCREAS SURGERY      STENT PLACED IN BILE DUCT       Medications Prior to Admission:    Prior to Admission medications    Medication Sig Start Date End Date Taking? Authorizing Provider   SITagliptin (JANUVIA) 50 MG tablet Take 25 mg by mouth daily   Yes Historical Provider, MD   carvedilol (COREG) 3.125 MG tablet Take 1 tablet by mouth 2 times daily 12/2/21  Yes Robin Pierre MD   apixaban (ELIQUIS) 5 MG TABS tablet Take 1 tablet by mouth 2 times daily 11/23/21  Yes LO Navarro - CNP   Multiple Vitamins-Minerals (COMPLETE SENIOR PO) Take by mouth   Yes Historical Provider, MD   gabapentin (NEURONTIN) 100 MG capsule Take 1 capsule by mouth 3 times daily for 30 days. Patient taking differently: Take 600 mg by mouth 3 times daily.   1/15/20 12/7/21 Yes LO Lomeli   clopidogrel (PLAVIX) 75 MG tablet Take 1 tablet by mouth daily 6/27/19  Yes Robin Pierre MD   aspirin EC 81 MG EC tablet Take 1 tablet by mouth daily 6/27/19  Yes Robin Pierre MD   lisinopril (PRINIVIL;ZESTRIL) 10 MG tablet lisinopril 10 mg tablet  DAILY   Yes Historical Provider, MD   doxyLAMINE succinate (SLEEP AID) 25 MG tablet Take 25 mg by mouth nightly    Historical Provider, MD       Allergies:  Phenergan [promethazine hcl]    Past Social History:  Social History     Socioeconomic History    Marital status:      Spouse name: Not on file    Number of children: Not on file    Years of education: Not on file    Highest education level: Not on file   Occupational History    Not on file   Tobacco Use    Smoking status: Never Smoker    Smokeless tobacco: Current User     Types: Chew   Vaping Use    Vaping Use: Never used   Substance and Sexual Activity    Alcohol use: Not Currently     Comment: occ    Drug use: No    Sexual activity: Not on file   Other Topics Concern    Not on file   Social History Narrative    Not on file     Social Determinants of Health     Financial Resource Strain:     Difficulty of Paying Living Expenses: Not on file   Food Insecurity:     Worried About Running Out of Food in the Last Year: Not on file    Yanira of Food in the Last Year: Not on file   Transportation Needs:     Lack of Transportation (Medical): Not on file    Lack of Transportation (Non-Medical):  Not on file   Physical Activity:     Days of Exercise per Week: Not on file    Minutes of Exercise per Session: Not on file   Stress:     Feeling of Stress : Not on file   Social Connections:     Frequency of Communication with Friends and Family: Not on file    Frequency of Social Gatherings with Friends and Family: Not on file    Attends Muslim Services: Not on file    Active Member of [address] [place] [address] or Organizations: Not on file    Attends Club or Organization Meetings: Not on file    Marital Status: Not on file   Intimate Partner Violence:     Fear of Current or Ex-Partner: Not on file    Emotionally Abused: Not on file    Physically Abused: Not on file    Sexually Abused: Not on file   Housing Stability:     Unable to Pay for Housing in the Last Year: Not on file    Number of [place] in the Last Year: Not on file    Unstable Housing in the Last Year: Not on file       Family History:       Problem Relation Age of Onset    Cancer Mother     Heart Disease Mother     Diabetes Mother     Tuberculosis Father            Physical Exam:    Vitals: BP (!) 140/85   Pulse 88   Temp 98 °F (36.7 °C) (Temporal)   Resp 26   Ht 5' 10\" (1.778 m)   Wt 148 lb (67.1 kg)   SpO2 (!) 86%   BMI 21.24 kg/m²   24HR INTAKE/OUTPUT:  No intake or output data in the 24 hours ending 12/07/21 1346    Physical Exam  Vitals reviewed. Constitutional:       General: He is not in acute distress. Appearance: Normal appearance. He is well-developed and normal weight. He is not ill-appearing, toxic-appearing or diaphoretic. HENT:      Head: Normocephalic and atraumatic. Nose: Nose normal.      Mouth/Throat:      Mouth: Mucous membranes are moist.      Pharynx: Oropharynx is clear. Eyes:      General: No scleral icterus. Extraocular Movements: Extraocular movements intact. Pupils: Pupils are equal, round, and reactive to light. Neck:      Vascular: No carotid bruit or JVD. Cardiovascular:      Rate and Rhythm: Normal rate and regular rhythm. Heart sounds: Normal heart sounds. No murmur heard. No friction rub. No gallop. Pulmonary:      Effort: Pulmonary effort is normal. No respiratory distress. Breath sounds: Normal breath sounds. No stridor. No wheezing, rhonchi or rales. Chest:      Chest wall: No tenderness. Abdominal:      General: Abdomen is flat. Bowel sounds are normal. There is no distension. Palpations: Abdomen is soft. There is no mass. Tenderness: There is no abdominal tenderness. There is no right CVA tenderness, left CVA tenderness, guarding or rebound. Hernia: No hernia is present. Musculoskeletal:         General: No swelling, tenderness, deformity or signs of injury. Cervical back: Normal range of motion and neck supple. No rigidity or tenderness. Right lower leg: No edema. Left lower leg: No edema. Lymphadenopathy:      Cervical: No cervical adenopathy. Skin:     General: Skin is warm and dry. Neurological:      General: No focal deficit present. Mental Status: He is alert and oriented to person, place, and time. Mental status is at baseline. Cranial Nerves: No cranial nerve deficit. Sensory: No sensory deficit. Motor: No weakness.       Coordination: Coordination normal.   Psychiatric:         Mood and Affect: Mood normal. Behavior: Behavior normal.         Thought Content: Thought content normal.         Judgment: Judgment normal.         LAB DATA:  CBC:   Recent Labs     12/07/21  0938   WBC 5.4   HGB 12.2*        BMP:    Recent Labs     12/07/21  0938      K 4.0      CO2 25   BUN 10   CREATININE 0.6   GLUCOSE 151*     Hepatic: No results for input(s): AST, ALT, ALB, BILITOT, ALKPHOS in the last 72 hours. CK, CKMB, Troponin: @LABRCNT (CKTOTAL:3, CKMB:3, TROPONINI:3)@  Pro-BNP: No results for input(s): BNP in the last 72 hours. Lipids: No results for input(s): CHOL, HDL in the last 72 hours. Invalid input(s): LDL  ABGs: No results for input(s): PHART, PIK8HIZ, PO2ART, QXB1LPV, BEART, HGBAE, N2IDFKZB, CARBOXHGBART, 02THERAPY in the last 72 hours. INR: No results for input(s): INR in the last 72 hours. A1c:Invalid input(s): HEMOGLOBIN A1C  URINALYSIS:   -----------------------------------------------------------------  IMAGING:    No results found. Assessment:    1. Worsening shortness of breath with elevated proBNP 2777 secondary to decompensated congestive heart failure  2. Acute on chronic systolic diastolic congestive heart failure  3. Paroxysmal atrial fibrillation presently in sinus rhythm today  4. Elevated D-dimer 1.14  5. Coronary artery disease  6. Hyperlipidemia  7. Ischemic cardiomyopathy  8. Chronic anticoagulation  9. Hypertension  10. Diabetes  11. Hypothyroidism  12. Prostate adenocarcinoma  13. MRI of the brain 4/9/2021 no acute findings  14. CT abdomen pelvis 2/17/2020 stable study compared to 1 and half years ago  15. Carotid duplex 3/28/2019 less than 50% stenosis internal carotid arteries bilaterally vertebral flow antegrade bilaterally  16. Echocardiogram 11/4/2021 moderate sized pericardial effusion moderate to severe LV systolic dysfunction left atrial enlargement  17.  Cardiac catheterization 12/4/2018 moderately impaired LV systolic function multivessel coronary artery disease 80% mid LAD  fills by collaterals from the right coronary artery  18. [place] 11/20/2018 EF 30% moderately severe global hypokinesis apical akinesis and/or dyskinesis large extensive infarct or scar mid to distal anterior anteroseptal and anteroapical region no evidence of ischemia    Recommendations:    1. Admit  2. Intravenous diuresis  3. CTA pulmonary  4.  Initiate arrhythmic therapy with amiodarone in a controlled setting further comments to follow      Rebecca Rocha MD, MD 12/7/2021 @ [facility]@

## 2021-12-08 NOTE — PROGRESS NOTES
While performing hourly rounds, patient's O2 saturation was 85% while patient was sleeping. When patient was awake, O2 saturation was 90%. Applied 2L oxygen via nasal cannula. Will continue to monitor.     Electronically signed by Luzma Stephenson RN on 12/8/2021 at 3:16 AM

## 2021-12-08 NOTE — CARE COORDINATION
Patient is current with [facility] for PT Services. Will follow. Please advise when patient discharges. WILL NEED RESUMPTION OF CARE ORDERS TO RESUME HH SERVICES WHEN PATIENT DISCHARGES. Thank you. [address] [place] [address] [phone]. [phone].     Luke Simmons RN, Home Care Liaison  [phone] P  Electronically signed by Luke Simmons on 12/8/2021 at 9:03 AM

## 2021-12-08 NOTE — PROGRESS NOTES
Per telemetry patient converted to afib at 0522 and at 0545 the heart rate was afib in the 140s-150s. This nurse immediately went to assess the patient. Patient was standing at bedside using the urinal. Assisted patient back to bed and instructed him to perform deep breathing exercises. Heart rate came down to 120s. Will continue to monitor patient and heart rate. Patient is on PO amiodarone TID.     Electronically signed by Sujit Parham RN on 12/8/2021 at 6:26 AM

## 2021-12-08 NOTE — PROGRESS NOTES
Telemetry called and said this patient's heart rate had been in the 150s for about 5 beats and immediately dropped back down. Went to assess patient. Patient is warm, dry, and resting comfortably. Heart rate is currently 78. Will continue to monitor.     Electronically signed by Crystal Watson RN on 12/8/2021 at 5:19 AM

## 2021-12-08 NOTE — CARE COORDINATION
Date / Time of Evaluation:   12/8/2021    3:03 PM  Assessment Completed by:   Linnea Herrmann      Patient Name:   Luiz Rodrigez MRN:   [id number]  YOB: 1950    Patient Admission Status:   Inpatient [101]    Patient Contact Information:    [address]  [phone] (home)   Telephone Information:   Mobile [phone]     Above information verified? [x]   Yes  []   No    (Best Practice:   Have patient/caregiver verify above address and phone number by stating out loud their current address and reachable phone number. Initial Assessment Completed at bedside with:      [x]   Patient  []   Family/Caregiver/Guardian   []   Other:      Current PCP:    LO Joseph CNP    PCP verified? [x]   Yes  []   No    Emergency Contacts:    Extended Emergency Contact Information  Primary Emergency Contact: Rose Brown  Address: \[facility]\"" [address] Phone: [phone]  Mobile Phone: [phone]  Relation: Spouse  Secondary Emergency Contact: Giovanny Gray [address] Phone: [phone]  Mobile Phone: [phone]  Relation: Child    Advance Directives:    Does Mr. Dez Sin have an advance directive in his electronic medical record? Yes  [][x]   No    Code Status:   Full Code      Have you been vaccinated for COVID-19 (SARS-CoV-2)? [x]   Yes  []   No                   If so, when?     Which :         []   Pf[employer]  []   Moderna  []   [employer]  []   Other:       Do you have any of the following unmet social needs that would keep you from returning home safely:    []   Yes  [x]   No                    Unmet Social Needs:           []   Living Situation/Housing  []   Food  []   Stroke Education   []   Utilities  []   Personal Safety  []   Financial Strain  []   Employment  []   Mental Health  []   Substance Abuse  []   Transportation Barriers    Additional Unmet Social Needs Notes:   n/a        Financial:    Payor: MEDICARE / Plan: MEDICARE PART A AND B / Product Type: *No Product type* /     Pre-Cert required for SNF:     []   Yes  [x]   No    Have Long Term Care Insurance:      []   Yes  [x]   No      Pharmacy:    [facility] [address] [address]  Phone: [phone] Fax: [phone]    Potential assistance purchasing medications? []   Yes  [x]   No      ADLS:       Support System:   Spouse/Significant Other, Children      Current Home Environment:       Steps:       [x]   Yes  []   No    If yes, how many?  3 steps entering home from garage and has built ramp since    Plans to RETURN to current housing:     [x]   Yes  []   No    Barriers to RETURNING to current housing:  SUREKHA for [place] services at [facility]      Currently ACTIVE with [address]:      [x]   Yes  []   No    Home Health Care Agency:  [facility] [place]      DME Provider:   has ramp to enter from garage, hospital bed, BSC, RW, W/C, rollator, shower seat & grab bars      Had [facility] prior to admission:  spouse is on O2 and utilizes at times    []   Yes  [x]   No    Oxygen Company:   spouse services through [place] Ph: [phone]  Fa: [phone]    Has a pulse oximetry unit at home:     [x]   Yes  []   No    Informed of need to bring portable home O2 tank to hospital on day of DISCHARGE:      []   Yes  [x]   No    Name of person committed to bringing portable tank at discharge:   n/a      Active with HD/PD prior to admission:             []   Yes  [x]   No    Nephrologist:     HD Center:         Transition Plan:   return home with spouse    Transportation PLAN for Discharge:  family to transport    Factors facilitating achievement of predicted outcomes: n/a    Barriers to discharge:  need SUREKHA for [place] services and home O2 eval      Patient Deficits:    []   Yes   [x]   No    If yes:    []   Confusion/Memory  []   Visual  [] Motor/Sensory         []   Right arm         []   Right leg         []   Left arm         []   Left leg  []   Language/Speech         []   Aphasia         []   Dysarthria         []   Swallow         [place] Coma Scale  Eye Opening: Spontaneous  Best Verbal Response: Oriented  Best Motor Response: Obeys commands  [place] Coma Scale Score: 15    Patient Deficit Notes: Additional CM/SW Notes: Pt lying in bed and not wearing NC. States his wife utilizes home O2 through [employer] and  he uses the O2 when he \"Has his spells. \" Dtr entered room when SW was present and confirmed no further dc needs at this time. Luigi and/or his family were provided with choice of provider:    []   Yes   [x]   No        []   Stroke education booklet reviewed and given to patient/family/caregiver/guardian. All questions answered all questions answered appropriately and efficiently per family.       [address]  Care Management  Phone:   [phone]          Fax:   [phone]

## 2021-12-08 NOTE — PROGRESS NOTES
Cardiology Daily Note Sandra Morris MD      Patient:  Dale Evans  [id number]    Patient Active Problem List    Diagnosis Date Noted   Mercedez Lupe [facility]) 12/07/2021    Chronic anticoagulation 12/02/2021    Paroxysmal atrial fibrillation ([address] 12/02/2021    Blurred vision, right eye 03/28/2019    Ischemic cardiomyopathy 01/17/2019    Coronary artery disease 12/17/2018    Hypercholesterolemia 77/80/0809    Systolic congestive heart failure ([address] 12/17/2018    Abnormal nuclear stress test 11/26/2018    Acute decompensated heart failure ([address] 11/04/2021    Gastro-esophageal reflux disease without esophagitis 10/30/2020    Low back pain 10/30/2020    Malignant neoplasm of prostate ([address] 10/30/2020    Type 2 diabetes mellitus without complications ([address] 39/33/9561    History of radiation therapy 05/30/2019    BMI 23.0-23.9, adult 01/09/2019    Adenocarcinoma of prostate, stage 2 ([address] 01/03/2019    Tobacco abuse 01/03/2019    Diabetes mellitus ([address] 12/20/2018    Family history of cancer 12/20/2018    Essential hypertension 09/24/2018    ESPINOSA (dyspnea on exertion) 09/24/2018    History of diabetes mellitus 33/99/2041    Diastolic dysfunction 83/97/8320    Hypothyroidism 07/06/2018       Admit Date:  12/7/2021    Admission Problem List: Present on Admission:   A-fib [facility])      Cardiac Specific Data:  Specialty Problems        Cardiology Problems    Coronary artery disease        Hypercholesterolemia        Systolic congestive heart failure (HCC)        Ischemic cardiomyopathy        Paroxysmal atrial fibrillation (HCC)        A-fib (HCC)        Essential hypertension        Acute decompensated heart failure ([address]              Subjective:  Mr. Jane Connolly seen today had an episode of his heart racing at [address] [place] [address] Rd., [address] telemetry indicates he is now back in atrial fibrillation. Denies chest pain per se. Dyspnea about the same. Blood pressure 121/82 heart rate 121.     Objective:   /82   Pulse 121   Temp 98 °F (36.7 °C) (Temporal)   Resp 18   Ht 5' 10\" (1.778 m)   Wt 148 lb (67.1 kg)   SpO2 91%   BMI 21.24 kg/m²       Intake/Output Summary (Last 24 hours) at 12/8/2021 1044  Last data filed at 12/8/2021 1033  Gross per 24 hour   Intake 240 ml   Output 2101 ml   Net -1861 ml       Prior to Admission medications    Medication Sig Start Date End Date Taking? Authorizing Provider   SITagliptin (JANUVIA) 50 MG tablet Take 25 mg by mouth daily   Yes Historical Provider, MD   carvedilol (COREG) 3.125 MG tablet Take 1 tablet by mouth 2 times daily 12/2/21  Yes Anjelica Membreno MD   apixaban (ELIQUIS) 5 MG TABS tablet Take 1 tablet by mouth 2 times daily 11/23/21  Yes LO Navarro - CNP   Multiple Vitamins-Minerals (COMPLETE SENIOR PO) Take by mouth   Yes Historical Provider, MD   gabapentin (NEURONTIN) 100 MG capsule Take 1 capsule by mouth 3 times daily for 30 days. Patient taking differently: Take 600 mg by mouth 3 times daily.   1/15/20 12/7/21 Yes LO Kwan   clopidogrel (PLAVIX) 75 MG tablet Take 1 tablet by mouth daily 6/27/19  Yes Anjelica Membreno MD   aspirin EC 81 MG EC tablet Take 1 tablet by mouth daily 6/27/19  Yes Anjelica Membreno MD   lisinopril (PRINIVIL;ZESTRIL) 10 MG tablet lisinopril 10 mg tablet  DAILY   Yes Historical Provider, MD   doxyLAMINE succinate (SLEEP AID) 25 MG tablet Take 25 mg by mouth nightly    Historical Provider, MD        furosemide  40 mg IntraVENous Q12H    sodium chloride flush  5-40 mL IntraVENous 2 times per day    amiodarone  200 mg Oral TID       TELEMETRY: Atrial fibrillation     Physical Exam:      Physical Exam      General:  Awake, alert, NAD  Skin:  Warm and dry  Neck:  no jvd , no carotid bruits  Chest:  Clear to auscultation, no wheezing or rales  Cardiovascular:  IRRR Q0H5 no murmurs, clicks, gallups, or rubs  Abdomen:  Soft nontender, nondistended, bowel sounds present  Extremities:  Edema: none         Lab Data:  CBC:   Recent Labs 12/07/21 0938 12/08/21 0225   WBC 5.4 4.6*   HGB 12.2* 10.7*   HCT 37.8* 32.3*   MCV 94.7* 92.6    159     BMP:   Recent Labs     12/07/21  0938 12/08/21 0225    140   K 4.0 3.5    105   CO2 25 25   BUN 10 12   CREATININE 0.6 0.7     LIVER PROFILE: No results for input(s): AST, ALT, LIPASE, BILIDIR, BILITOT, ALKPHOS in the last 72 hours. Invalid input(s): AMYLASE,  ALB  PT/INR: No results for input(s): PROTIME, INR in the last 72 hours. APTT: No results for input(s): APTT in the last 72 hours. BNP:  No results for input(s): BNP in the last 72 hours. CK, CKMB, Troponin: @LABRCNT (CKTOTAL:3, CKMB:3, TROPONINI:3)@    IMAGING:  CTA PULMONARY W CONTRAST    Result Date: 12/7/2021  CTA PULMONARY W CONTRAST 12/7/2021 3:26 PM History: Elevated d-dimer. In order to have a CT radiation dose as low as reasonably achievable Automated Exposure Control was utilized for adjustment of the mA and/or KV according to patient size. DLP in mGycm= 661. CTA pulmonary with contrast. CT angiography protocol. CT imaging with bolus IV contrast injection. Under concurrent supervision axial, sagittal, coronal, and three-dimensional data sets were constructed. Cardiac enlargement. Large pericardial effusion. Moderate bilateral pleural effusions. Bibasilar atelectasis. No pneumothorax and no sign of heart failure. Normal size thoracic aorta. Symmetric well opacified pulmonary arteries. No pulmonary embolism. Moderate degenerative thoracic spurring. 1. Pericardial fluid and moderate pleural effusions. 2. Bibasilar atelectasis. 3. No pulmonary embolism. Signed by Dr Jt Houston        Assessment:  1. Worsening shortness of breath with elevated proBNP 2777 secondary to decompensated congestive heart failure  2. Acute on chronic systolic diastolic congestive heart failure  3. Paroxysmal atrial fibrillation presently in sinus rhythm today  4. Elevated D-dimer 1.14  5.  Coronary artery disease  6. Hyperlipidemia  7. Ischemic cardiomyopathy  8. Chronic anticoagulation  9. Hypertension  10. Diabetes  11. Hypothyroidism  12. Prostate adenocarcinoma  13. MRI of the brain 4/9/2021 no acute findings  14. CT abdomen pelvis 2/17/2020 stable study compared to 1 and half years ago  15. Carotid duplex 3/28/2019 less than 50% stenosis internal carotid arteries bilaterally vertebral flow antegrade bilaterally  16. Echocardiogram 11/4/2021 moderate sized pericardial effusion moderate to severe LV systolic dysfunction left atrial enlargement  17. Cardiac catheterization 12/4/2018 moderately impaired LV systolic function multivessel coronary artery disease 80% mid LAD  fills by collaterals from the right coronary artery  18. Delmar Lopese 11/20/2018 EF 30% moderately severe global hypokinesis apical akinesis and/or dyskinesis large extensive infarct or scar mid to distal anterior anteroseptal and anteroapical region no evidence of ischemia       Plan:  1.  Continue current medical therapy and monitor    Karthikeyan Rolon MD, MD 12/8/2021 10:44 AM

## 2021-12-09 NOTE — PROGRESS NOTES
Cardiology Daily Note Daniella Millard MD      Patient:  Myah Dangeloleyda  [id number]    Patient Active Problem List    Diagnosis Date Noted   Uli Smith [facility]) 12/07/2021    Chronic anticoagulation 12/02/2021    Paroxysmal atrial fibrillation ([facility] 12/02/2021    Blurred vision, right eye 03/28/2019    Ischemic cardiomyopathy 01/17/2019    Coronary artery disease 12/17/2018    Hypercholesterolemia 87/50/8611    Systolic congestive heart failure ([address] 12/17/2018    Abnormal nuclear stress test 11/26/2018    Acute decompensated heart failure ([address] 11/04/2021    Gastro-esophageal reflux disease without esophagitis 10/30/2020    Low back pain 10/30/2020    Malignant neoplasm of prostate ([address] 10/30/2020    Type 2 diabetes mellitus without complications ([address] 18/25/8898    History of radiation therapy 05/30/2019    BMI 23.0-23.9, adult 01/09/2019    Adenocarcinoma of prostate, stage 2 ([address] 01/03/2019    Tobacco abuse 01/03/2019    Diabetes mellitus ([address] 12/20/2018    Family history of cancer 12/20/2018    Essential hypertension 09/24/2018    ESPINOSA (dyspnea on exertion) 09/24/2018    History of diabetes mellitus 50/32/3495    Diastolic dysfunction 85/00/2203    Hypothyroidism 07/06/2018       Admit Date:  12/7/2021    Admission Problem List: Present on Admission:   A-fib [facility])      Cardiac Specific Data:  Specialty Problems        Cardiology Problems    Coronary artery disease        Hypercholesterolemia        Systolic congestive heart failure (HCC)        Ischemic cardiomyopathy        Paroxysmal atrial fibrillation (HCC)        A-fib ([address]        Essential hypertension        Acute decompensated heart failure ([address]              Subjective:  Mr. Lowell Horvath seen today no further atrial fibrillation remains on oral amiodarone blood pressure 149/90 heart 88. Dyspnea improved. We will recheck labs tomorrow. No new complaints or issues reported.     Objective:   BP (!) 149/90   Pulse 88   Temp 97.9 °F (36.6 °C)   Resp 16   Ht 5' 10\" (1.778 m)   Wt 148 lb (67.1 kg)   SpO2 95%   BMI 21.24 kg/m²       Intake/Output Summary (Last 24 hours) at 12/9/2021 1033  Last data filed at 12/9/2021 0483  Gross per 24 hour   Intake 360 ml   Output 1275 ml   Net -915 ml       Prior to Admission medications    Medication Sig Start Date End Date Taking? Authorizing Provider   SITagliptin (JANUVIA) 50 MG tablet Take 25 mg by mouth daily   Yes Historical Provider, MD   carvedilol (COREG) 3.125 MG tablet Take 1 tablet by mouth 2 times daily 12/2/21  Yes Christina Shane MD   apixaban (ELIQUIS) 5 MG TABS tablet Take 1 tablet by mouth 2 times daily 11/23/21  Yes LO Navarro - CNP   Multiple Vitamins-Minerals (COMPLETE SENIOR PO) Take by mouth   Yes Historical Provider, MD   gabapentin (NEURONTIN) 100 MG capsule Take 1 capsule by mouth 3 times daily for 30 days. Patient taking differently: Take 600 mg by mouth 3 times daily.   1/15/20 12/7/21 Yes LO Aguilar   clopidogrel (PLAVIX) 75 MG tablet Take 1 tablet by mouth daily 6/27/19  Yes Christina Shane MD   aspirin EC 81 MG EC tablet Take 1 tablet by mouth daily 6/27/19  Yes Christina Shane MD   lisinopril (PRINIVIL;ZESTRIL) 10 MG tablet lisinopril 10 mg tablet  DAILY   Yes Historical Provider, MD   doxyLAMINE succinate (SLEEP AID) 25 MG tablet Take 25 mg by mouth nightly    Historical Provider, MD        sodium chloride flush  5-40 mL IntraVENous 2 times per day    amiodarone  200 mg Oral TID       TELEMETRY: Sinus     Physical Exam:      Physical Exam      General:  Awake, alert, NAD  Skin:  Warm and dry  Neck:  no jvd , no carotid bruits  Chest:  Clear to auscultation, no wheezing or rales  Cardiovascular:  RRR P0O6 no murmurs, clicks, gallups, or rubs  Abdomen:  Soft nontender, nondistended, bowel sounds present  Extremities:  Edema: none      Lab Data:  CBC:   Recent Labs     12/07/21  0938 12/08/21  0225   WBC 5.4 4.6*   HGB 12.2* 10.7*   HCT 37.8* 32.3*   MCV 94.7* 92.6    159     BMP:   Recent Labs     12/07/21  0938 12/08/21  0225    140   K 4.0 3.5    105   CO2 25 25   BUN 10 12   CREATININE 0.6 0.7     LIVER PROFILE: No results for input(s): AST, ALT, LIPASE, BILIDIR, BILITOT, ALKPHOS in the last 72 hours. Invalid input(s): AMYLASE,  ALB  PT/INR: No results for input(s): PROTIME, INR in the last 72 hours. APTT: No results for input(s): APTT in the last 72 hours. BNP:  No results for input(s): BNP in the last 72 hours. CK, CKMB, Troponin: @LABRCNT (CKTOTAL:3, CKMB:3, TROPONINI:3)@    IMAGING:  CTA PULMONARY W CONTRAST    Result Date: 12/7/2021  CTA PULMONARY W CONTRAST 12/7/2021 3:26 PM History: Elevated d-dimer. In order to have a CT radiation dose as low as reasonably achievable Automated Exposure Control was utilized for adjustment of the mA and/or KV according to patient size. DLP in mGycm= 661. CTA pulmonary with contrast. CT angiography protocol. CT imaging with bolus IV contrast injection. Under concurrent supervision axial, sagittal, coronal, and three-dimensional data sets were constructed. Cardiac enlargement. Large pericardial effusion. Moderate bilateral pleural effusions. Bibasilar atelectasis. No pneumothorax and no sign of heart failure. Normal size thoracic aorta. Symmetric well opacified pulmonary arteries. No pulmonary embolism. Moderate degenerative thoracic spurring. 1. Pericardial fluid and moderate pleural effusions. 2. Bibasilar atelectasis. 3. No pulmonary embolism. Signed by Dr Maury Velasco        Assessment:  1. Worsening shortness of breath with elevated proBNP 2777 secondary to decompensated congestive heart failure  2. Acute on chronic systolic diastolic congestive heart failure  3. Paroxysmal atrial fibrillation presently in sinus rhythm today  4. Elevated D-dimer 1.14  5. Coronary artery disease  6. Hyperlipidemia  7. Ischemic cardiomyopathy  8.  Chronic anticoagulation  9. Hypertension  10. Diabetes  11. Hypothyroidism  12. Prostate adenocarcinoma  13. MRI of the brain 4/9/2021 no acute findings  14. CT abdomen pelvis 2/17/2020 stable study compared to 1 and half years ago  15. Carotid duplex 3/28/2019 less than 50% stenosis internal carotid arteries bilaterally vertebral flow antegrade bilaterally  16. Echocardiogram 11/4/2021 moderate sized pericardial effusion moderate to severe LV systolic dysfunction left atrial enlargement  17. Cardiac catheterization 12/4/2018 moderately impaired LV systolic function multivessel coronary artery disease 80% mid LAD  fills by collaterals from the right coronary artery  18. Pablo Romaner 11/20/2018 EF 30% moderately severe global hypokinesis apical akinesis and/or dyskinesis large extensive infarct or scar mid to distal anterior anteroseptal and anteroapical region no evidence of ischemia      Plan:  1.  Continue current medical therapy and monitor    Paolo Boyce MD, MD 12/9/2021 10:33 AM

## 2021-12-09 NOTE — PLAN OF CARE
Problem: Falls - Risk of:  Goal: Will remain free from falls  Description: Will remain free from falls  Outcome: Ongoing  Goal: Absence of physical injury  Description: Absence of physical injury  Outcome: Ongoing     Problem: Skin Integrity:  Goal: Will show no infection signs and symptoms  Description: Will show no infection signs and symptoms  Outcome: Ongoing  Goal: Absence of new skin breakdown  Description: Absence of new skin breakdown  Outcome: Ongoing     Problem: Cardiac:  Goal: Ability to maintain an adequate cardiac output will improve  Description: Ability to maintain an adequate cardiac output will improve  Outcome: Ongoing     Problem: Fluid Volume:  Goal: Ability to achieve and maintain adequate urine output will improve  Description: Ability to achieve and maintain adequate urine output will improve  Outcome: Ongoing

## 2021-12-10 NOTE — PLAN OF CARE
Problem: Falls - Risk of:  Goal: Will remain free from falls  Description: Will remain free from falls  12/10/2021 0136 by Jose Rapp RN  Outcome: Ongoing  12/9/2021 1754 by Luis Fernando Jack RN  Outcome: Ongoing  Goal: Absence of physical injury  Description: Absence of physical injury  12/10/2021 0136 by Jose Rapp RN  Outcome: Ongoing  12/9/2021 1754 by Luis Fernando Jack RN  Outcome: Ongoing     Problem: Skin Integrity:  Goal: Will show no infection signs and symptoms  Description: Will show no infection signs and symptoms  12/10/2021 0136 by Jose Rapp RN  Outcome: Ongoing  12/9/2021 1754 by Luis Fernando Jack RN  Outcome: Ongoing  Goal: Absence of new skin breakdown  Description: Absence of new skin breakdown  12/10/2021 0136 by Jose Rapp RN  Outcome: Ongoing  12/9/2021 1754 by Luis Fernando Jack RN  Outcome: Ongoing     Problem: Cardiac:  Goal: Ability to maintain an adequate cardiac output will improve  Description: Ability to maintain an adequate cardiac output will improve  12/10/2021 0136 by Jose Rapp RN  Outcome: Ongoing  12/9/2021 1754 by Luis Fernando Jack RN  Outcome: Ongoing     Problem: Fluid Volume:  Goal: Ability to achieve and maintain adequate urine output will improve  Description: Ability to achieve and maintain adequate urine output will improve  12/10/2021 0136 by Jose Rapp RN  Outcome: Ongoing  12/9/2021 1754 by Luis Fernando Jack RN  Outcome: Ongoing

## 2021-12-10 NOTE — PROGRESS NOTES
Cardiology Daily Note Ramón Iqbal MD      Patient:  Agnes Part  [id number]    Patient Active Problem List    Diagnosis Date Noted   Radha Francois [facility]) 12/07/2021    Chronic anticoagulation 12/02/2021    Paroxysmal atrial fibrillation ([address] 12/02/2021    Blurred vision, right eye 03/28/2019    Ischemic cardiomyopathy 01/17/2019    Coronary artery disease 12/17/2018    Hypercholesterolemia 15/08/8098    Systolic congestive heart failure ([address] 12/17/2018    Abnormal nuclear stress test 11/26/2018    Acute decompensated heart failure ([address] 11/04/2021    Gastro-esophageal reflux disease without esophagitis 10/30/2020    Low back pain 10/30/2020    Malignant neoplasm of prostate ([address] 10/30/2020    Type 2 diabetes mellitus without complications ([address] 83/40/6589    History of radiation therapy 05/30/2019    BMI 23.0-23.9, adult 01/09/2019    Adenocarcinoma of prostate, stage 2 ([address] 01/03/2019    Tobacco abuse 01/03/2019    Diabetes mellitus ([address] 12/20/2018    Family history of cancer 12/20/2018    Essential hypertension 09/24/2018    ESPINOSA (dyspnea on exertion) 09/24/2018    History of diabetes mellitus 18/96/1677    Diastolic dysfunction 30/73/5835    Hypothyroidism 07/06/2018       Admit Date:  12/7/2021    Admission Problem List: Present on Admission:   A-fib [facility])      Cardiac Specific Data:  Specialty Problems        Cardiology Problems    Coronary artery disease        Hypercholesterolemia        Systolic congestive heart failure (HCC)        Ischemic cardiomyopathy        Paroxysmal atrial fibrillation (HCC)        A-fib (HCC)        Essential hypertension        Acute decompensated heart failure ([address]              Subjective:  Mr. Graham Becerril seen today back in atrial fibrillation denies chest pain dyspnea stable blood pressure 126/81 heart 76. No other complaints or issues reported.     Objective:   /81   Pulse 76   Temp 98.6 °F (37 °C) (Temporal)   Resp 20   Ht 5' 10\" (1.778 m) Wt 148 lb (67.1 kg)   SpO2 92%   BMI 21.24 kg/m²       Intake/Output Summary (Last 24 hours) at 12/10/2021 1032  Last data filed at 12/10/2021 0257  Gross per 24 hour   Intake 600 ml   Output 325 ml   Net 275 ml       Prior to Admission medications    Medication Sig Start Date End Date Taking? Authorizing Provider   SITagliptin (JANUVIA) 50 MG tablet Take 25 mg by mouth daily   Yes Historical Provider, MD   carvedilol (COREG) 3.125 MG tablet Take 1 tablet by mouth 2 times daily 12/2/21  Yes Laura Guerra MD   apixaban (ELIQUIS) 5 MG TABS tablet Take 1 tablet by mouth 2 times daily 11/23/21  Yes LO Navarro - CNP   Multiple Vitamins-Minerals (COMPLETE SENIOR PO) Take by mouth   Yes Historical Provider, MD   gabapentin (NEURONTIN) 100 MG capsule Take 1 capsule by mouth 3 times daily for 30 days. Patient taking differently: Take 600 mg by mouth 3 times daily.   1/15/20 12/7/21 Yes LO [place]   clopidogrel (PLAVIX) 75 MG tablet Take 1 tablet by mouth daily 6/27/19  Yes Laura Guerra MD   aspirin EC 81 MG EC tablet Take 1 tablet by mouth daily 6/27/19  Yes Laura Guerra MD   lisinopril (PRINIVIL;ZESTRIL) 10 MG tablet lisinopril 10 mg tablet  DAILY   Yes Historical Provider, MD   doxyLAMINE succinate (SLEEP AID) 25 MG tablet Take 25 mg by mouth nightly    Historical Provider, MD        furosemide  20 mg Oral BID    potassium chloride  40 mEq Oral Once    sodium chloride flush  5-40 mL IntraVENous 2 times per day    amiodarone  200 mg Oral TID       TELEMETRY: Atrial fibrillation     Physical Exam:      Physical Exam      General:  Awake, alert, NAD  Skin:  Warm and dry  Neck:  no jvd , no carotid bruits  Chest:  Clear to auscultation, no wheezing or rales  Cardiovascular:  RRR I7H9 no murmurs, clicks, gallups, or rubs  Abdomen:  Soft nontender, nondistended, bowel sounds present  Extremities:  Edema: none       Lab Data:  CBC:   Recent Labs     12/08/21  0225   WBC 4.6* HGB 10.7*   HCT 32.3*   MCV 92.6        BMP:   Recent Labs     12/08/21  0225 12/10/21  0126    139   K 3.5 3.6    100   CO2 25 25   BUN 12 12   CREATININE 0.7 0.6     LIVER PROFILE: No results for input(s): AST, ALT, LIPASE, BILIDIR, BILITOT, ALKPHOS in the last 72 hours. Invalid input(s): AMYLASE,  ALB  PT/INR: No results for input(s): PROTIME, INR in the last 72 hours. APTT: No results for input(s): APTT in the last 72 hours. BNP:  No results for input(s): BNP in the last 72 hours. CK, CKMB, Troponin: @LABRCNT (CKTOTAL:3, CKMB:3, TROPONINI:3)@    IMAGING:  CTA PULMONARY W CONTRAST    Result Date: 12/7/2021  CTA PULMONARY W CONTRAST 12/7/2021 3:26 PM History: Elevated d-dimer. In order to have a CT radiation dose as low as reasonably achievable Automated Exposure Control was utilized for adjustment of the mA and/or KV according to patient size. DLP in mGycm= 661. CTA pulmonary with contrast. CT angiography protocol. CT imaging with bolus IV contrast injection. Under concurrent supervision axial, sagittal, coronal, and three-dimensional data sets were constructed. Cardiac enlargement. Large pericardial effusion. Moderate bilateral pleural effusions. Bibasilar atelectasis. No pneumothorax and no sign of heart failure. Normal size thoracic aorta. Symmetric well opacified pulmonary arteries. No pulmonary embolism. Moderate degenerative thoracic spurring. 1. Pericardial fluid and moderate pleural effusions. 2. Bibasilar atelectasis. 3. No pulmonary embolism. Signed by Dr Mohan Blackman        Assessment:  1. Worsening shortness of breath with elevated proBNP 2777 secondary to decompensated congestive heart failure  2. Acute on chronic systolic diastolic congestive heart failure  3. Paroxysmal atrial fibrillation presently in sinus rhythm today  4. Elevated D-dimer 1.14  5. Coronary artery disease  6. Hyperlipidemia  7. Ischemic cardiomyopathy  8.  Chronic anticoagulation  9. Hypertension  10. Diabetes  11. Hypothyroidism  12. Prostate adenocarcinoma  13. MRI of the brain 4/9/2021 no acute findings  14. CT abdomen pelvis 2/17/2020 stable study compared to 1 and half years ago  15. Carotid duplex 3/28/2019 less than 50% stenosis internal carotid arteries bilaterally vertebral flow antegrade bilaterally  16. Echocardiogram 11/4/2021 moderate sized pericardial effusion moderate to severe LV systolic dysfunction left atrial enlargement  17. Cardiac catheterization 12/4/2018 moderately impaired LV systolic function multivessel coronary artery disease 80% mid LAD  fills by collaterals from the right coronary artery  18. Darrick Greek 11/20/2018 EF 30% moderately severe global hypokinesis apical akinesis and/or dyskinesis large extensive infarct or scar mid to distal anterior anteroseptal and anteroapical region no evidence of ischemia       Plan:  1. Correct potassium to greater than 4.0 mEq/L  2. Resume Lasix orally  3.  Continue to monitor    Ramón Iqbal MD, MD 12/10/2021 10:32 AM

## 2021-12-11 NOTE — PROGRESS NOTES
Cardiology Daily Note Aishwarya Jerome MD      Patient:  Rosie Candelaria  [id number]    Patient Active Problem List    Diagnosis Date Noted   [facility]) 12/07/2021    Chronic anticoagulation 12/02/2021    Paroxysmal atrial fibrillation ([address] 12/02/2021    Blurred vision, right eye 03/28/2019    Ischemic cardiomyopathy 01/17/2019    Coronary artery disease 12/17/2018    Hypercholesterolemia 53/32/8717    Systolic congestive heart failure ([address] 12/17/2018    Abnormal nuclear stress test 11/26/2018    Acute decompensated heart failure ([address] 11/04/2021    Gastro-esophageal reflux disease without esophagitis 10/30/2020    Low back pain 10/30/2020    Malignant neoplasm of prostate ([address] 10/30/2020    Type 2 diabetes mellitus without complications ([address] 75/07/0657    History of radiation therapy 05/30/2019    BMI 23.0-23.9, adult 01/09/2019    Adenocarcinoma of prostate, stage 2 ([address] 01/03/2019    Tobacco abuse 01/03/2019    Diabetes mellitus ([address] 12/20/2018    Family history of cancer 12/20/2018    Essential hypertension 09/24/2018    ESPINOSA (dyspnea on exertion) 09/24/2018    History of diabetes mellitus 30/54/9959    Diastolic dysfunction 44/54/2329    Hypothyroidism 07/06/2018       Admit Date:  12/7/2021    Admission Problem List: Present on Admission:   A-fib [facility])      Cardiac Specific Data:  Specialty Problems        Cardiology Problems    Coronary artery disease        Hypercholesterolemia        Systolic congestive heart failure (HCC)        Ischemic cardiomyopathy        Paroxysmal atrial fibrillation (HCC)        A-fib (HCC)        Essential hypertension        Acute decompensated heart failure ([address]              Subjective:  Mr. Maria Elena House seen today resting comfortably complains of not feeling well nauseated and vomiting during the evening possibly could be related to amiodarone but other factors could be present we will consult the hospitalist.  Also still short of breath when he lays down flat if anything worse will intensify diuresis blood pressure 148/96 heart 82. No reported chest pain. Objective:   BP (!) 148/96   Pulse 82   Temp 97.7 °F (36.5 °C) (Temporal)   Resp 24   Ht 5' 10\" (1.778 m)   Wt 149 lb (67.6 kg)   SpO2 96%   BMI 21.38 kg/m²       Intake/Output Summary (Last 24 hours) at 12/11/2021 1127  Last data filed at 12/11/2021 0606  Gross per 24 hour   Intake 360 ml   Output 550 ml   Net -190 ml       Prior to Admission medications    Medication Sig Start Date End Date Taking? Authorizing Provider   SITagliptin (JANUVIA) 50 MG tablet Take 25 mg by mouth daily   Yes Historical Provider, MD   carvedilol (COREG) 3.125 MG tablet Take 1 tablet by mouth 2 times daily 12/2/21  Yes Juliet Mcclendon MD   apixaban (ELIQUIS) 5 MG TABS tablet Take 1 tablet by mouth 2 times daily 11/23/21  Yes LO Navarro - CNP   Multiple Vitamins-Minerals (COMPLETE SENIOR PO) Take by mouth   Yes Historical Provider, MD   gabapentin (NEURONTIN) 100 MG capsule Take 1 capsule by mouth 3 times daily for 30 days. Patient taking differently: Take 600 mg by mouth 3 times daily.   1/15/20 12/7/21 Yes LO Meehan   clopidogrel (PLAVIX) 75 MG tablet Take 1 tablet by mouth daily 6/27/19  Yes Juliet Mcclendon MD   aspirin EC 81 MG EC tablet Take 1 tablet by mouth daily 6/27/19  Yes Juliet Mcclendon MD   lisinopril (PRINIVIL;ZESTRIL) 10 MG tablet lisinopril 10 mg tablet  DAILY   Yes Historical Provider, MD   doxyLAMINE succinate (SLEEP AID) 25 MG tablet Take 25 mg by mouth nightly    Historical Provider, MD        furosemide  20 mg Oral BID    sodium chloride flush  5-40 mL IntraVENous 2 times per day    amiodarone  200 mg Oral TID       TELEMETRY: Sinus     Physical Exam:      Physical Exam      General:  Awake, alert, NAD  Skin:  Warm and dry  Neck:  no jvd , no carotid bruits  Chest:  Clear to auscultation, no wheezing or rales  Cardiovascular:  RRR X6Z2 no murmurs, clicks, gallups, or rubs  Abdomen:  Soft nontender, nondistended, bowel sounds present  Extremities:  Edema: none     Lab Data:  CBC: No results for input(s): WBC, HGB, HCT, MCV, PLT in the last 72 hours. BMP:   Recent Labs     12/10/21  0126      K 3.6      CO2 25   BUN 12   CREATININE 0.6     LIVER PROFILE: No results for input(s): AST, ALT, LIPASE, BILIDIR, BILITOT, ALKPHOS in the last 72 hours. Invalid input(s): AMYLASE,  ALB  PT/INR: No results for input(s): PROTIME, INR in the last 72 hours. APTT: No results for input(s): APTT in the last 72 hours. BNP:  No results for input(s): BNP in the last 72 hours. CK, CKMB, Troponin: @LABRCNT (CKTOTAL:3, CKMB:3, TROPONINI:3)@    IMAGING:  CTA PULMONARY W CONTRAST    Result Date: 12/7/2021  CTA PULMONARY W CONTRAST 12/7/2021 3:26 PM History: Elevated d-dimer. In order to have a CT radiation dose as low as reasonably achievable Automated Exposure Control was utilized for adjustment of the mA and/or KV according to patient size. DLP in mGycm= 661. CTA pulmonary with contrast. CT angiography protocol. CT imaging with bolus IV contrast injection. Under concurrent supervision axial, sagittal, coronal, and three-dimensional data sets were constructed. Cardiac enlargement. Large pericardial effusion. Moderate bilateral pleural effusions. Bibasilar atelectasis. No pneumothorax and no sign of heart failure. Normal size thoracic aorta. Symmetric well opacified pulmonary arteries. No pulmonary embolism. Moderate degenerative thoracic spurring. 1. Pericardial fluid and moderate pleural effusions. 2. Bibasilar atelectasis. 3. No pulmonary embolism. Signed by Dr Emma Parham        Assessment:  1. Worsening shortness of breath with elevated proBNP 2777 secondary to decompensated congestive heart failure  2. Acute on chronic systolic diastolic congestive heart failure  3. Paroxysmal atrial fibrillation presently in sinus rhythm today  4.  Elevated D-dimer 1. 14  5. Coronary artery disease  6. Hyperlipidemia  7. Ischemic cardiomyopathy  8. Chronic anticoagulation  9. Hypertension  10. Diabetes  11. Hypothyroidism  12. Prostate adenocarcinoma  13. MRI of the brain 4/9/2021 no acute findings  14. CT abdomen pelvis 2/17/2020 stable study compared to 1 and half years ago  15. Carotid duplex 3/28/2019 less than 50% stenosis internal carotid arteries bilaterally vertebral flow antegrade bilaterally  16. Echocardiogram 11/4/2021 moderate sized pericardial effusion moderate to severe LV systolic dysfunction left atrial enlargement  17. Cardiac catheterization 12/4/2018 moderately impaired LV systolic function multivessel coronary artery disease 80% mid LAD  fills by collaterals from the right coronary artery  18. [place] [facility] 11/20/2018 EF 30% moderately severe global hypokinesis apical akinesis and/or dyskinesis large extensive infarct or scar mid to distal anterior anteroseptal and anteroapical region no evidence of ischemia      Plan:  1. Intensify diuretics  2.  Hospitalist consult for nausea and vomiting    Ida Vaca MD, MD 12/11/2021 11:27 AM

## 2021-12-12 PROBLEM — I50.23 ACUTE ON CHRONIC SYSTOLIC HEART FAILURE (HCC): Status: ACTIVE | Noted: 2021-01-01

## 2021-12-12 PROBLEM — I48.91 ATRIAL FIBRILLATION WITH RVR (HCC): Status: ACTIVE | Noted: 2021-01-01

## 2021-12-12 PROBLEM — R11.2 NAUSEA AND VOMITING: Status: ACTIVE | Noted: 2021-01-01

## 2021-12-12 NOTE — PROGRESS NOTES
Received call from telemetry pt converted from afib to sinus rhythm at 1052.  Patient is currently sinus @ 75bpm

## 2021-12-12 NOTE — PROGRESS NOTES
Pt converted back into afib, heart rate 140, amiodarone given. Rate now controlled 100-110.  Electronically signed by Karmen Herrmann RN on 12/12/2021 at 2:55 AM

## 2021-12-12 NOTE — CONSULTS
[address] - Consult      PCP: LO Smalls CNP    Date of Admission: 12/7/2021    Date of Service: 12/12/2021    Consult requested by: Jaime Cancino MD    Reason for consult: Nausea and Vomiting, and Medical management     Chief Complaint:  Nausea     History Of Present Illness: The patient is a 66 y.o. male who presented to [facility] for elective cardioversion per cardiology. However patient was found to be in normal sinus rhythm on admission, cardiology recommended admission for further treatment. Patient was started on p.o. amiodarone. Patient continued to go in and out of . [facility] medicine was consulted for nausea and vomiting and medical management. Patient denied nausea or vomiting today. Reported he had episode of nausea early in the morning yesterday. Plans for possible cardioversion tomorrow per cardiology. Past Medical History:        Diagnosis Date    Arthritis     Asthma     Cancer ([facility] 08/2018    Prostrate.  Coronary artery disease 12/17/2018    Diabetes mellitus ([facility]     Hypercholesterolemia 12/17/2018    Hypertension     Hypothyroidism 7/6/2018    Pancreatitis     Rheumatic fever     as child    Systolic congestive heart failure ([facility] 12/17/2018       Past Surgical History:        Procedure Laterality Date    APPENDECTOMY      CHOLECYSTECTOMY      PANCREAS SURGERY      STENT PLACED IN BILE DUCT       Home Medications:  Prior to Admission medications    Medication Sig Start Date End Date Taking?  Authorizing Provider   SITagliptin (JANUVIA) 50 MG tablet Take 25 mg by mouth daily   Yes Historical Provider, MD   carvedilol (COREG) 3.125 MG tablet Take 1 tablet by mouth 2 times daily 12/2/21  Yes Jaime Cancino MD   apixaban (ELIQUIS) 5 MG TABS tablet Take 1 tablet by mouth 2 times daily 11/23/21  Yes LO Valera - CNP   Multiple Vitamins-Minerals (COMPLETE SENIOR PO) Take by mouth   Yes Historical Provider, MD   gabapentin (NEURONTIN) 100 MG capsule Take 1 capsule by mouth 3 times daily for 30 days. Patient taking differently: Take 600 mg by mouth 3 times daily. 1/15/20 12/7/21 Yes LO Null   clopidogrel (PLAVIX) 75 MG tablet Take 1 tablet by mouth daily 6/27/19  Yes Jade Hanson MD   aspirin EC 81 MG EC tablet Take 1 tablet by mouth daily 6/27/19  Yes Jade Hanson MD   lisinopril (PRINIVIL;ZESTRIL) 10 MG tablet lisinopril 10 mg tablet  DAILY   Yes Historical Provider, MD   doxyLAMINE succinate (SLEEP AID) 25 MG tablet Take 25 mg by mouth nightly    Historical Provider, MD       Allergies:    Phenergan [promethazine hcl]    Social History:  The patient currently lives at home   Tobacco:   reports that he has never smoked. His smokeless tobacco use includes chew. Alcohol:   reports previous alcohol use. Family History:      Problem Relation Age of Onset   Cuco Petties Cancer Mother     Heart Disease Mother     Diabetes Mother     Tuberculosis Father        Review of Systems:   Review of Systems   Constitutional: Negative for chills, diaphoresis, fatigue and fever. HENT: Negative for congestion and ear pain. Eyes: Negative for visual disturbance. Respiratory: Negative for cough, shortness of breath and wheezing. Denies SOB   Cardiovascular: Negative for chest pain, palpitations and leg swelling. Denies chest pain   Gastrointestinal: Negative for abdominal distention, abdominal pain, blood in stool, constipation, diarrhea, nausea and vomiting. Denies N/V today    Endocrine: Negative for cold intolerance and heat intolerance. Genitourinary: Negative for difficulty urinating, flank pain, frequency and urgency. Musculoskeletal: Negative for arthralgias and myalgias. Skin: Negative for color change and wound. Neurological: Negative for dizziness, syncope, weakness, light-headedness, numbness and headaches.    Hematological: Does not bruise/bleed easily. Psychiatric/Behavioral: Negative for agitation, confusion and dysphoric mood. Physical Examination:  Vitals: /68   Pulse 87   Temp 96.5 °F (35.8 °C) (Temporal)   Resp 20   Ht 5' 10\" (1.778 m)   Wt 149 lb (67.6 kg)   SpO2 95%   BMI 21.38 kg/m²     Physical Exam  Constitutional:       General: He is not in acute distress. Appearance: Normal appearance. He is not ill-appearing. HENT:      Head: Normocephalic and atraumatic. Right Ear: External ear normal.      Left Ear: External ear normal.      Nose: Nose normal.      Mouth/Throat:      Mouth: Mucous membranes are moist.   Eyes:      Extraocular Movements: Extraocular movements intact. Conjunctiva/sclera: Conjunctivae normal.      Pupils: Pupils are equal, round, and reactive to light. Cardiovascular:      Rate and Rhythm: Tachycardia present. Rhythm irregular. Pulses: Normal pulses. Heart sounds: Normal heart sounds. Pulmonary:      Effort: Pulmonary effort is normal. No respiratory distress. Breath sounds: Normal breath sounds. No wheezing, rhonchi or rales. Abdominal:      General: Bowel sounds are normal. There is no distension. Palpations: Abdomen is soft. Tenderness: There is no abdominal tenderness. Musculoskeletal:         General: No swelling, tenderness or deformity. Normal range of motion. Cervical back: Normal range of motion and neck supple. No muscular tenderness. Right lower leg: Edema present. Left lower leg: Edema present. Skin:     General: Skin is warm and dry. Findings: No bruising or lesion. Neurological:      Mental Status: He is alert and oriented to person, place, and time. Psychiatric:         Mood and Affect: Mood normal.         Behavior: Behavior normal.         Thought Content: Thought content normal.          Diagnostic Data:  Imaging:   CTA PULMONARY W CONTRAST   Final Result   1. Pericardial fluid and moderate pleural effusions.    2. Bibasilar atelectasis. 3. No pulmonary embolism. Signed by Dr Zohaib Kolb        CBC:  Recent Labs     12/12/21 0827   WBC 5.4   HGB 13.4*   HCT 40.3*        BMP:  Recent Labs     12/10/21  0126 12/12/21  0827    139   K 3.6 3.6    101   CO2 25 28   BUN 12 8   CREATININE 0.6 0.7   CALCIUM 9.0 8.8       Active Hospital Problems    Diagnosis Date Noted    [facility]) [I48.91] 12/07/2021     Priority: High    Coronary artery disease [I25.10] 12/17/2018     Priority: High    Nausea and vomiting [R11.2] 12/12/2021    Diabetes mellitus ([facility] [E11.9] 12/20/2018    Essential hypertension [I10] 09/24/2018       Assessment and Plan:  Principal Problem:    A-fib ([facility]  Active Problems:    Coronary artery disease    Essential hypertension    Diabetes mellitus (HCC)    Nausea and vomiting  Resolved Problems:    * No resolved hospital problems. *    Principal Problem:    A-[facility])-    - Cardiology plans for possible cardioversion tomorrow   - Continue amiodarone per cardiology   - Continue Eliquis   - Monitor on telemetry      Active Problems:  Nausea and vomiting-resolved   - Antiemetics as needed      Coronary artery disease- continue current medications per cardiology      Essential hypertension- stable at this time, continue current medications, monitor BP closely      Diabetes mellitus ([address]- SSI, Accu-Chek, hypoglycemia treatment protocol in place          DVT Prophylaxis: Gregorio Johnson, LO - CNP  12/12/2021,3:42 PM           EMR Dragon/Transcription disclaimer:   Much of this encounter note is an electronic transcription/translation of spoken language to printed text.  The electronic translation of spoken language may permit erroneous, or at times, nonsensical words or phrases to be inadvertently transcribed; although attempts have made to review the note for such errors, some may still exist.

## 2021-12-12 NOTE — PROGRESS NOTES
Cardiology Daily Note Audie Morgan MD      Patient:  Jeanie Ha  [id number]    Patient Active Problem List    Diagnosis Date Noted   [place] [facility]) 12/07/2021    Chronic anticoagulation 12/02/2021    Paroxysmal atrial fibrillation ([facility] 12/02/2021    Blurred vision, right eye 03/28/2019    Ischemic cardiomyopathy 01/17/2019    Coronary artery disease 12/17/2018    Hypercholesterolemia 82/67/4593    Systolic congestive heart failure ([address] 12/17/2018    Abnormal nuclear stress test 11/26/2018    Acute decompensated heart failure ([address] 11/04/2021    Gastro-esophageal reflux disease without esophagitis 10/30/2020    Low back pain 10/30/2020    Malignant neoplasm of prostate ([address] 10/30/2020    Type 2 diabetes mellitus without complications ([facility] 03/80/3299    History of radiation therapy 05/30/2019    BMI 23.0-23.9, adult 01/09/2019    Adenocarcinoma of prostate, stage 2 ([facility] 01/03/2019    Tobacco abuse 01/03/2019    Diabetes mellitus ([facility] 12/20/2018    Family history of cancer 12/20/2018    Essential hypertension 09/24/2018    ESPINOSA (dyspnea on exertion) 09/24/2018    History of diabetes mellitus 19/79/8437    Diastolic dysfunction 13/87/6578    Hypothyroidism 07/06/2018       Admit Date:  12/7/2021    Admission Problem List: Present on Admission:   A-fib [facility])      Cardiac Specific Data:  Specialty Problems        Cardiology Problems    Coronary artery disease        Hypercholesterolemia        Systolic congestive heart failure (HCC)        Ischemic cardiomyopathy        Paroxysmal atrial fibrillation (HCC)        A-fib (HCC)        Essential hypertension        Acute decompensated heart failure ([address]              Subjective:  Mr. Mary Baptiste seen today feeling much better dyspnea improved no other complaints or issues reported. Appreciate hospitalist consult. Remains in atrial fibrillation we will keep n.p.o. possible cardioversion tomorrow. Blood pressure 125/84 heart 86.     Objective: /84   Pulse 86   Temp 99 °F (37.2 °C) (Temporal)   Resp 18   Ht 5' 10\" (1.778 m)   Wt 149 lb (67.6 kg)   SpO2 97%   BMI 21.38 kg/m²       Intake/Output Summary (Last 24 hours) at 12/12/2021 1056  Last data filed at 12/12/2021 0458  Gross per 24 hour   Intake 880 ml   Output 500 ml   Net 380 ml       Prior to Admission medications    Medication Sig Start Date End Date Taking? Authorizing Provider   SITagliptin (JANUVIA) 50 MG tablet Take 25 mg by mouth daily   Yes Historical Provider, MD   carvedilol (COREG) 3.125 MG tablet Take 1 tablet by mouth 2 times daily 12/2/21  Yes Robin Pierre MD   apixaban (ELIQUIS) 5 MG TABS tablet Take 1 tablet by mouth 2 times daily 11/23/21  Yes LO Navarro - CNP   Multiple Vitamins-Minerals (COMPLETE SENIOR PO) Take by mouth   Yes Historical Provider, MD   gabapentin (NEURONTIN) 100 MG capsule Take 1 capsule by mouth 3 times daily for 30 days. Patient taking differently: Take 600 mg by mouth 3 times daily.   1/15/20 12/7/21 Yes LO Lomeli   clopidogrel (PLAVIX) 75 MG tablet Take 1 tablet by mouth daily 6/27/19  Yes Robin Pierre MD   aspirin EC 81 MG EC tablet Take 1 tablet by mouth daily 6/27/19  Yes Robin Pierre MD   lisinopril (PRINIVIL;ZESTRIL) 10 MG tablet lisinopril 10 mg tablet  DAILY   Yes Historical Provider, MD   doxyLAMINE succinate (SLEEP AID) 25 MG tablet Take 25 mg by mouth nightly    Historical Provider, MD        apixaban  5 mg Oral BID    amiodarone  200 mg Oral BID    furosemide  40 mg Oral BID    sodium chloride flush  5-40 mL IntraVENous 2 times per day       TELEMETRY: Atrial fibrillation     Physical Exam:      Physical Exam      General:  Awake, alert, NAD  Skin:  Warm and dry  Neck:  no jvd , no carotid bruits  Chest:  Clear to auscultation, no wheezing or rales  Cardiovascular:  IRRR Q0H6 no murmurs, clicks, gallups, or rubs  Abdomen:  Soft nontender, nondistended, bowel sounds present  Extremities:  Edema: none      Lab Data:  CBC:   Recent Labs     12/12/21 0827   WBC 5.4   HGB 13.4*   HCT 40.3*   MCV 93.3        BMP:   Recent Labs     12/10/21  0126 12/12/21 0827    139   K 3.6 3.6    101   CO2 25 28   BUN 12 8   CREATININE 0.6 0.7     LIVER PROFILE: No results for input(s): AST, ALT, LIPASE, BILIDIR, BILITOT, ALKPHOS in the last 72 hours. Invalid input(s): AMYLASE,  ALB  PT/INR: No results for input(s): PROTIME, INR in the last 72 hours. APTT: No results for input(s): APTT in the last 72 hours. BNP:  No results for input(s): BNP in the last 72 hours. CK, CKMB, Troponin: @LABRCNT (CKTOTAL:3, CKMB:3, TROPONINI:3)@    IMAGING:  CTA PULMONARY W CONTRAST    Result Date: 12/7/2021  CTA PULMONARY W CONTRAST 12/7/2021 3:26 PM History: Elevated d-dimer. In order to have a CT radiation dose as low as reasonably achievable Automated Exposure Control was utilized for adjustment of the mA and/or KV according to patient size. DLP in mGycm= 661. CTA pulmonary with contrast. CT angiography protocol. CT imaging with bolus IV contrast injection. Under concurrent supervision axial, sagittal, coronal, and three-dimensional data sets were constructed. Cardiac enlargement. Large pericardial effusion. Moderate bilateral pleural effusions. Bibasilar atelectasis. No pneumothorax and no sign of heart failure. Normal size thoracic aorta. Symmetric well opacified pulmonary arteries. No pulmonary embolism. Moderate degenerative thoracic spurring. 1. Pericardial fluid and moderate pleural effusions. 2. Bibasilar atelectasis. 3. No pulmonary embolism. Signed by Dr Jillian Oleary        Assessment:  1. Worsening shortness of breath with elevated proBNP 2777 secondary to decompensated congestive heart failure  2. Acute on chronic systolic diastolic congestive heart failure  3. Paroxysmal atrial fibrillation presently in sinus rhythm today  4. Elevated D-dimer 1.14  5.  Coronary artery disease  6. Hyperlipidemia  7. Ischemic cardiomyopathy  8. Chronic anticoagulation  9. Hypertension  10. Diabetes  11. Hypothyroidism  12. Prostate adenocarcinoma  13. MRI of the brain 4/9/2021 no acute findings  14. CT abdomen pelvis 2/17/2020 stable study compared to 1 and half years ago  15. Carotid duplex 3/28/2019 less than 50% stenosis internal carotid arteries bilaterally vertebral flow antegrade bilaterally  16. Echocardiogram 11/4/2021 moderate sized pericardial effusion moderate to severe LV systolic dysfunction left atrial enlargement  17. Cardiac catheterization 12/4/2018 moderately impaired LV systolic function multivessel coronary artery disease 80% mid LAD  fills by collaterals from the right coronary artery  18. Danielle Robles 11/20/2018 EF 30% moderately severe global hypokinesis apical akinesis and/or dyskinesis large extensive infarct or scar mid to distal anterior anteroseptal and anteroapical region no evidence of ischemia       Plan:  1.  Continue current therapy increase activity  2. N.p.o. after midnight possible cardioversion tomorrow    Nate Paul MD, MD 12/12/2021 10:56 AM

## 2021-12-13 PROBLEM — E87.6 HYPOKALEMIA: Status: ACTIVE | Noted: 2021-01-01

## 2021-12-13 NOTE — PROGRESS NOTES
Placed on room air. Patients oxygen is staying between 92-94% on continuous pulse ox.    Electronically signed by Marissa Jameson RN on 12/13/2021 at 11:35 AM

## 2021-12-13 NOTE — DISCHARGE SUMMARY
Discharge Summary    Cezar Robles  :  1950  MRN:  [id number]    Admit date:  2021  Discharge date:      Admitting Physician:  Andrew Coronel MD    Advance Directive: Full Code    Consults: Hospitalist    Primary Care Physician:  Lisa Godfrey APRN - CNP    Discharge Diagnoses:  Principal Problem:    Atrial fibrillation with RVR [facility])  Active Problems:    Coronary artery disease    Paroxysmal atrial fibrillation (HCC)    A-fib ([address]    Acute on chronic systolic heart failure ([address]    Essential hypertension    Diabetes mellitus ([address]    Nausea and vomiting    Hypokalemia  Resolved Problems:    * No resolved hospital problems. *      Cardiology Specific Data:  Specialty Problems        Cardiology Problems    Coronary artery disease        Hypercholesterolemia        Systolic congestive heart failure (HCC)        Ischemic cardiomyopathy        Paroxysmal atrial fibrillation (HCC)        A-fib (HCC)        Acute on chronic systolic heart failure (HCC)        Essential hypertension        Acute decompensated heart failure (HCC)        * (Principal) Atrial fibrillation with RVR ([address]              Significant Diagnostic Studies:   CTA PULMONARY W CONTRAST    Result Date: 2021  CTA PULMONARY W CONTRAST 2021 3:26 PM History: Elevated d-dimer. In order to have a CT radiation dose as low as reasonably achievable Automated Exposure Control was utilized for adjustment of the mA and/or KV according to patient size. DLP in mGycm= 661. CTA pulmonary with contrast. CT angiography protocol. CT imaging with bolus IV contrast injection. Under concurrent supervision axial, sagittal, coronal, and three-dimensional data sets were constructed. Cardiac enlargement. Large pericardial effusion. Moderate bilateral pleural effusions. Bibasilar atelectasis. No pneumothorax and no sign of heart failure. Normal size thoracic aorta. Symmetric well opacified pulmonary arteries. No pulmonary embolism.  Moderate degenerative thoracic spurring. 1. Pericardial fluid and moderate pleural effusions. 2. Bibasilar atelectasis. 3. No pulmonary embolism. Signed by Dr Yvonne Killian      Pertinent Labs:   CBC:   Recent Labs     12/12/21 0827 12/13/21 0144   WBC 5.4 5.5   HGB 13.4* 12.1*    164     BMP:    Recent Labs     12/12/21 0827 12/13/21  0144    137   K 3.6 3.1*    101   CO2 28 27   BUN 8 10   CREATININE 0.7 0.7   GLUCOSE 149* 155*     INR: No results for input(s): INR in the last 72 hours. Lipids: No results for input(s): CHOL, HDL in the last 72 hours. Invalid input(s): LDLCALCU  ABGs:No results for input(s): PHART, VND6OYD, PO2ART, AHJ8NIR, BEART, HGBAE, U7AWIPUS, CARBOXHGBART, 02THERAPY in the last 72 hours. HgBA1c:  No results for input(s): LABA1C in the last 72 hours. Procedures: CTA pulmonary    Hospital Course: Admitted for adjustment of medications in a monitored setting and intravenous diuresis due to congestive heart failure. Treated with intravenous diuretics. Dyspnea improved. Had some bouts of atrial fibrillation off and on started on oral amiodarone sinus rhythm today. Also had some nausea and vomiting hospitalist consulted. At this time feeling well vital signs stable cardiac rhythm sinus now felt to be stable improved and appropriate for discharge. Physical Exam:    Vital Signs: BP (!) 116/96   Pulse 79   Temp 97 °F (36.1 °C)   Resp 18   Ht 5' 10\" (1.778 m)   Wt 149 lb (67.6 kg)   SpO2 95%   BMI 21.38 kg/m²     Physical Exam      Discharge Medications:    @DISCHARGEMEDSLIST(<NOROUTINE> error)@    Discharge Instructions:   Kehinde Schneider MD  [address]  [phone]    On 1/3/2022  2:30 pm         Take medications as directed. Resume activity as tolerated.     Diet: Diet NPO Exceptions are: Sips of Water with Meds     Disposition: Patient is medically stable and will be discharged *    Tito Kwong MD, 12/13/2021 9:28 AM

## 2021-12-13 NOTE — PROGRESS NOTES
Daily Progress Note    Date:2021  Patient: Nohelia Gee  : 1950  EQV:[id number]  CODE:Prior No additional code details  LO Ling CNP    Admit Date: 2021  9:04 AM   LOS: 6 days       Subjective: No acute events overnight. Back in sinus rhythm. Denies any worsening dyspnea, chest pain or palpitations. No nausea or vomiting. Review of Systems        Comprehensive ROS completed and is negative except as otherwise noted            Objective:      Vital signs in last 24 hours:  Patient Vitals for the past 24 hrs:   BP Temp Temp src Pulse Resp SpO2   21 0730 (!) 116/96 97 °F (36.1 °C)  79 18 95 %   21 0712     20 95 %   21 0023 112/66 97.6 °F (36.4 °C) Temporal 73 18 95 %   21 1834      97 %   21 1736 122/74 97 °F (36.1 °C)  85 18 98 %     Physical Exam  Constitutional:       General: He is not in acute distress. Appearance: Normal appearance. He is not ill-appearing. HENT:      Head: Normocephalic and atraumatic. Right Ear: External ear normal.      Left Ear: External ear normal.      Nose: Nose normal.      Mouth/Throat:      Mouth: Mucous membranes are moist.   Eyes:      Extraocular Movements: Extraocular movements intact. Conjunctiva/sclera: Conjunctivae normal.      Pupils: Pupils are equal, round, and reactive to light. Cardiovascular:      Rate and Rhythm: Normal rate. Regular rhythm. Pulses: Normal pulses. Heart sounds: Normal heart sounds. Pulmonary:      Effort: Pulmonary effort is normal. No respiratory distress. Breath sounds: Normal breath sounds. No wheezing, rhonchi or rales. Abdominal:      General: Bowel sounds are normal. There is no distension. Palpations: Abdomen is soft. Tenderness: There is no abdominal tenderness. Musculoskeletal:         General: No swelling, tenderness or deformity. Normal range of motion.       Cervical back: Normal range of motion and neck supple. No muscular tenderness. Right lower leg: Edema present. Left lower leg: Edema present. Skin:     General: Skin is warm and dry. Findings: No bruising or lesion. Neurological:      Mental Status: He is alert and oriented to person, place, and time. Psychiatric:         Mood and Affect: Mood normal.         Behavior: Behavior normal.         Thought Content:  Thought content normal.     Lab Review   Recent Results (from the past 24 hour(s))   POCT Glucose    Collection Time: 12/12/21  7:01 PM   Result Value Ref Range    POC Glucose 180 (H) 70 - 99 mg/dl    Performed on AccuChek    Basic Metabolic Panel w/ Reflex to MG    Collection Time: 12/13/21  1:44 AM   Result Value Ref Range    Sodium 137 136 - 145 mmol/L    Potassium reflex Magnesium 3.1 (L) 3.5 - 5.0 mmol/L    Chloride 101 98 - 111 mmol/L    CO2 27 22 - 29 mmol/L    Anion Gap 9 7 - 19 mmol/L    Glucose 155 (H) 74 - 109 mg/dL    BUN 10 8 - 23 mg/dL    CREATININE 0.7 0.5 - 1.2 mg/dL    GFR Non-African American >60 >60    GFR African American >59 >59    Calcium 8.4 (L) 8.8 - 10.2 mg/dL   CBC Auto Differential    Collection Time: 12/13/21  1:44 AM   Result Value Ref Range    WBC 5.5 4.8 - 10.8 K/uL    RBC 3.91 (L) 4.70 - 6.10 M/uL    Hemoglobin 12.1 (L) 14.0 - 18.0 g/dL    Hematocrit 35.5 (L) 42.0 - 52.0 %    MCV 90.8 80.0 - 94.0 fL    MCH 30.9 27.0 - 31.0 pg    MCHC 34.1 33.0 - 37.0 g/dL    RDW 13.4 11.5 - 14.5 %    Platelets 374 051 - 314 K/uL    MPV 10.0 9.4 - 12.4 fL    Neutrophils % 63.0 50.0 - 65.0 %    Lymphocytes % 20.9 20.0 - 40.0 %    Monocytes % 10.8 (H) 0.0 - 10.0 %    Eosinophils % 4.0 0.0 - 5.0 %    Basophils % 0.9 0.0 - 1.0 %    Neutrophils Absolute 3.5 1.5 - 7.5 K/uL    Immature Granulocytes # 0.0 K/uL    Lymphocytes Absolute 1.2 1.1 - 4.5 K/uL    Monocytes Absolute 0.60 0.00 - 0.90 K/uL    Eosinophils Absolute 0.20 0.00 - 0.60 K/uL    Basophils Absolute 0.10 0.00 - 0.20 K/uL   Magnesium    Collection Time: 12/13/21  1:44 AM   Result Value Ref Range    Magnesium 1.8 1.6 - 2.4 mg/dL   EKG 12 Lead    Collection Time: 12/13/21  6:48 AM   Result Value Ref Range    P-R Interval 126 ms    QRS Duration 100 ms    Q-T Interval 432 ms    QTc Calculation (Bazett) 461 ms    P Axis undef degrees    T Axis undef degrees   POCT Glucose    Collection Time: 12/13/21  7:33 AM   Result Value Ref Range    POC Glucose 146 (H) 70 - 99 mg/dl    Performed on AccuChek    POCT Glucose    Collection Time: 12/13/21 11:52 AM   Result Value Ref Range    POC Glucose 169 (H) 70 - 99 mg/dl    Performed on AccuChek        I/O last 3 completed shifts: In: 240 [P.O.:240]  Out: 645 [Urine:645]  No intake/output data recorded. No current facility-administered medications for this encounter. Current Outpatient Medications:     amiodarone (CORDARONE) 200 MG tablet, Take 1 tablet by mouth 2 times daily, Disp: 60 tablet, Rfl: 2    furosemide (LASIX) 40 MG tablet, Take 1 tablet by mouth 2 times daily, Disp: 60 tablet, Rfl: 3    potassium chloride (KLOR-CON M) 20 MEQ extended release tablet, Take 1 tablet by mouth 2 times daily, Disp: 60 tablet, Rfl: 5    SITagliptin (JANUVIA) 50 MG tablet, Take 25 mg by mouth daily, Disp: , Rfl:     carvedilol (COREG) 3.125 MG tablet, Take 1 tablet by mouth 2 times daily, Disp: 60 tablet, Rfl: 5    apixaban (ELIQUIS) 5 MG TABS tablet, Take 1 tablet by mouth 2 times daily, Disp: 30 tablet, Rfl: 0    Multiple Vitamins-Minerals (COMPLETE SENIOR PO), Take by mouth, Disp: , Rfl:     gabapentin (NEURONTIN) 100 MG capsule, Take 1 capsule by mouth 3 times daily for 30 days.  (Patient taking differently: Take 600 mg by mouth 3 times daily. ), Disp: 90 capsule, Rfl: 0    clopidogrel (PLAVIX) 75 MG tablet, Take 1 tablet by mouth daily, Disp: 30 tablet, Rfl: 5    aspirin EC 81 MG EC tablet, Take 1 tablet by mouth daily, Disp: 30 tablet, Rfl: 5    lisinopril (PRINIVIL;ZESTRIL) 10 MG tablet, lisinopril 10 mg tablet  DAILY, Disp: , Rfl:    doxyLAMINE succinate (SLEEP AID) 25 MG tablet, Take 25 mg by mouth nightly, Disp: , Rfl:         Assessment/plan  Principal Problem:    Atrial fibrillation with RVR (HCC)  Active Problems:    Coronary artery disease    Paroxysmal atrial fibrillation (HCC)    A-fib (HCC)    Acute on chronic systolic heart failure (HCC)    Essential hypertension    Diabetes mellitus (HCC)    Nausea and vomiting    Hypokalemia  Resolved Problems:    * No resolved hospital problems. *    In and out of atrial fibrillation  Back in normal sinus rhythm on p.o. amiodarone  Monitor telemetry  Eliquis for anticoagulation    Acute on chronic heart failure  Continue p.o.  Lasix    Nausea/vomitingresolved  As needed antiemetic    Hypertension  Monitor BP, stable    DM  Glucose monitoring, sliding scale insulin if needed    Disposition: Per primary service      Florentin Krause MD 12/13/2021 5:34 PM

## 2021-12-27 NOTE — TELEPHONE ENCOUNTER
PT daughter [facility] calling about pt upcoming defibrillator placement 12/29. She is questioning instructions given them on discharge from hospital 12/2 to hold Eliquis prior to procedure but does not address his Plavix. He was not instructed to hold Plavix also and that does not make sense to her. Notified Akilah Rausch since Dr Ida Yanez is provider.  She is to return call to [facility] at [phone]

## 2021-12-29 PROBLEM — I50.43 CHF (CONGESTIVE HEART FAILURE), NYHA CLASS I, ACUTE ON CHRONIC, COMBINED (HCC): Status: ACTIVE | Noted: 2021-01-01

## 2021-12-29 NOTE — LETTER
[facility]  Cardiac Rehab Department  [address]  [phone]  Toll Free [phone]              January 3, 2022    Dear Jeison Has,    Please find enclosed information concerning the care of your pacemaker insertion site and the guidelines you should follow for the next four weeks of your recovery. Each of these recommendations apply unless you were specifically instructed otherwise by your cardiologist.    If you have not already received one, a transtelephonic patient transmitter has been ordered for you on your behalf. When in your possession and appropriate, unbox the transmitter, plug it in and complete your first pacemaker check by following the instructional pictures in the easy-to-follow pamphlet or on the transmitter itself. In the event you have a 'smartphone', an maria del carmen may have been downloaded on your phone during your hospitilization to allow you to use your phone for the same purpose. If any questions or concerns arise or you experience difficulty completing the steps stated above, you should contact your cardiologist's office for assistance. Rest assured that the use of your transmitter will be reviewed with you as needed during your upcoming follow-up visit in Dr. Novak's office or via teleconference. Thank you. To Your [facility],        [facility] Cardiac Rehab Staff    Malaika Miller, GAIL, MA Jade De La Vega, MONSERRAT  Registered Nurse  Exercise Physiologist  Registered Nurse

## 2021-12-29 NOTE — FLOWSHEET NOTE
This note also relates to the following rows which could not be included:  Pulse - Cannot attach notes to unvalidated device data  Resp - Cannot attach notes to unvalidated device data  BP - Cannot attach notes to unvalidated device data  MAP (mmHg) - Cannot attach notes to unvalidated device data    Dr. Sunny Pardo here to see pt and family. Right chest pacer site with some bleeding and bruising noted and seen by Dr. Sunny Pardo.

## 2021-12-29 NOTE — FLOWSHEET NOTE
This note also relates to the following rows which could not be included:  Pulse - Cannot attach notes to unvalidated device data  Resp - Cannot attach notes to unvalidated device data  BP - Cannot attach notes to unvalidated device data  MAP (mmHg) - Cannot attach notes to unvalidated device data    Clean dressing applied to right chest pacer site.

## 2021-12-29 NOTE — PROGRESS NOTES
Pt arrived from cath holding by stretcher. Pt placed on bed in room . Telemetry placed on pt. Sinus 63 at present.  Will start admission and VS. Electronically signed by Antoni Novoa RN on 12/29/2021 at 5:02 PM

## 2021-12-29 NOTE — PROCEDURES
Cardiac Dual Chamber ICD placement Operative Report    Jarek Evans  [id number]  12/29/2021    Surgeon: Alexandru Alvarado    Anesthesia: Intravenous sedation and local anesthetic    Procedure(s):1. Dual-chamber ICD placement        Indications:   1. Ischemic cardiomyopathy with ejection fraction less than 30%  2. Chronic congestive heart failure systolic and diastolic [place] heart association class III    Procedure Details  The risks, benefits, complications, treatment options, and expected outcomes were discussed with the patient. The patient and/or family concurred with the proposed plan, giving informed consent. Patient was prepped and draped in the usual strict sterile fashion. After the antibiotic was completely infused, 30 cc Sensorcaine was infiltrated into the right Infraclavicular area. Venous access obtained utilizing a micropuncture needle under ultrasonographic guidance and the micropuncture wire was advanced followed by the dilator. The wire was then exchanged for a standard 0.0.35 wire and then the dilator was removed. Next, an incision was made adjacent to the wire entry site. Utilizing sharp and blunt dissection a pocket was then created down to the prepectoralis fascia. The guidewire was identified, freed from the surrounding subcutaneous tissue, and delivered into the operative field. Next an 9french sheath and dilator was advanced over the wire then the dilator was removed. Another similar wire was advanced into the sheath then the sheath was removed leaving two wires in place in the subclavian vein. Next, the 7french sheath and dilators were advanced over their respective wires into the subclavian vein. The dilator and wires were then removed. Next the atrial and ventricular leads were inserted into their respective sheaths   And advanced into the right atrium and ventricle where the leads were positioned under fluoroscopic guidance. The sheaths were peeled away and removed.  Appropriate sensing and thresholds were obtained. No diaphragmatic pacing occurred at 10 V and 1.5 ms. The active fixation mechanisms were extended. Next, the leads were then sutured utilizing 2-0 ethibond sutures. Lead measurements were then rechecked. After having assured adequate hemostasis the leads were connected to the pulse generator and the pulse generator and leads were placed in the pocket. The pocket was copiously irrigated utilizing antibiotic solution. The pacemaker and leads system were visualized under fluoroscopy. Appropriate redundancy/slack in the leads were noted. The pins of the leads were beyond the set screws. Hemostasis was reverified. The pocket was then closed using 2-0 Vicryl for the deep layer and 3-0 Vicryl for the middle layer. Surgical staples were then applied to the skin and sterile dressing was applied. At the end of the procedure instrument, needle, and sponge counts were correct. An arm immobilizer was applied at this point and the patient was transferred. An independent trained observer administered medications under my direction. The patient was continuously monitored with respect to level of consciousness, and vital signs/physiologic status throughout the case. For further details regarding specific medications and doses please refer to Cath Lab procedural notes.       Anesthesia start time:1127  Anesthesia stop time:1241          Pacemaker Data:       Atrial lead   Medtronic  Model   5483-07   serial #   [id number]  Volt    0.75 V    PW    0.4 ms    Current   NA   ma       Impedance:   554   ohms        Slew rate:   NA   V/sec  P wave:   1.8 mv    Right Ventricular lead    Medtronic  Model 7449Q57   serial #   [id number]  Volt    0.5    V     PW    0.4 ms     Current   NA   ma    I  Impedance:   478   ohms       Slew rate:   NA V/sec  R wave:    20 mv      Generator  Medtronic  Model   [id number]  Serial   [id number]          Estimated Blood Loss: Minimal         Complications:  None; patient tolerated the procedure well. Disposition:  To progressive care unit           Condition: stable      Mike Leigh MD 12/29/2021 12:58 PM I have personally performed a face to face diagnostic evaluation on this patient. I have reviewed the ACP note and agree with the history, exam and plan of care, except as noted.

## 2021-12-29 NOTE — H&P
Office Visit    12/2/2021  [facility] MD    Interventional Cardiology  Paroxysmal atrial fibrillation [facility]) +6 more    Dx  Follow-Up from Hospital    Reason for Visit       Progress Notes  Baljinder Kauffman MD (Physician) [facility] Inter[facility] Cardiology  Expand All Collapse All  [facility] Progress Note                              Date:                12/2/2021  Patient:            Katie Cobian. Age:                 66 y. o., 1950        Reason for evaluation:            SUBJECTIVE:    Returns today for follow-up assessment. Recently hospitalized with atrial fibrillation a few weeks ago converted spontaneously. Echocardiogram shows moderate to severe LV dysfunction. Denies awareness of palpitations since that time. Denies chest pain dyspnea stable denies any bleeding issues no other complaints or issues reported. Previous ejection fraction by cath in 2018 was 30%. Blood pressure today 120/76 heart rate 87. EKG tracing today shows sinus rhythm old anterior infarct nonspecific ST-T abnormalities.     Review of Systems   Constitutional: Negative. Negative for chills, fever and unexpected weight change. HENT: Negative. Eyes: Negative. Respiratory: Negative. Negative for shortness of breath. Cardiovascular: Negative. Negative for chest pain. Gastrointestinal: Negative. Negative for diarrhea, nausea and vomiting. Endocrine: Negative. Genitourinary: Negative. Musculoskeletal: Negative. Skin: Negative. Neurological: Negative. All other systems reviewed and are negative.           OBJECTIVE:     /76   Pulse 87   Ht 5' 10\" (1.778 m)   Wt 148 lb (67.1 kg)   BMI 21.24 kg/m²      Labs:   CBC: No results for input(s): WBC, HGB, HCT, PLT in the last 72 hours. BMP:No results for input(s): NA, K, CO2, BUN, CREATININE, LABGLOM, GLUCOSE in the last 72 hours.   BNP: No results for input(s): BNP in the last 72 hours.  PT/INR: No results for input(s): PROTIME, INR in the last 72 hours. APTT:No results for input(s): APTT in the last 72 hours. CARDIAC ENZYMES:No results for input(s): CKTOTAL, CKMB, CKMBINDEX, TROPONINI in the last 72 hours. FASTING LIPID PANEL:        Lab Results   Component Value Date     HDL 51 02/18/2019     LDLCALC 66 02/18/2019     TRIG 81 02/18/2019      LIVER PROFILE:No results for input(s): AST, ALT, LABALBU in the last 72 hours.         Past Medical History        Past Medical History:   Diagnosis Date    Arthritis      Asthma      [email or web address] 08/2018     Prostrate.      Coronary artery disease 12/17/2018    Diabetes mellitus ([facility]      Hypercholesterolemia 12/17/2018    Hypertension      Hypothyroidism 7/6/2018    Rheumatic fever       as child    Systolic congestive heart failure ([facility] 12/17/2018         Past Surgical History         Past Surgical History:   Procedure Laterality Date    APPENDECTOMY        CHOLECYSTECTOMY        PANCREAS SURGERY         STENT PLACED IN BILE DUCT         Family History         Family History   Problem Relation Age of Onset    Cancer Mother      Tuberculosis Father                Allergies   Allergen Reactions    Phenergan [Promethazine Hcl]        Current Facility-Administered Medications          Current Outpatient Medications   Medication Sig Dispense Refill    SITagliptin (JANUVIA) 50 MG tablet Take 25 mg by mouth daily        apixaban (ELIQUIS) 5 MG TABS tablet Take 1 tablet by mouth 2 times daily 30 tablet 0    Multiple Vitamins-Minerals (COMPLETE SENIOR PO) Take by mouth        doxyLAMINE succinate (SLEEP AID) 25 MG tablet Take 25 mg by mouth nightly        clopidogrel (PLAVIX) 75 MG tablet Take 1 tablet by mouth daily 30 tablet 5    aspirin EC 81 MG EC tablet Take 1 tablet by mouth daily 30 tablet 5    lisinopril (PRINIVIL;ZESTRIL) 10 MG tablet lisinopril 10 mg tablet  DAILY        gabapentin (NEURONTIN) 100 MG capsule Take 1 capsule by mouth 3 times daily for 30 days. (Patient taking differently: Take 600 mg by mouth 3 times daily. ) 90 capsule 0      No current facility-administered medications for this visit.         Social History               Socioeconomic History    Marital status:        Spouse name: Not on file    Number of children: Not on file    Years of education: Not on file    Highest education level: Not on file   Occupational History    Not on file   Tobacco Use    Smoking status: Never Smoker    Smokeless tobacco: Current User       Types: Chew   Vaping Use    Vaping Use: Never used   Substance and Sexual Activity    Alcohol use: Not Currently       Comment: occ    Drug use: No    Sexual activity: Not on file   Other Topics Concern    Not on file   Social History Narrative    Not on file      Social Determinants of Health          Financial Resource Strain:     Difficulty of Paying Living Expenses: Not on file   Food Insecurity:     Worried About Running Out of Food in the Last Year: Not on file    Yanira of Food in the Last Year: Not on file   Transportation Needs:     Lack of Transportation (Medical): Not on file    Lack of Transportation (Non-Medical):  Not on file   Physical Activity:     Days of Exercise per Week: Not on file    Minutes of Exercise per Session: Not on file   Stress:     Feeling of Stress : Not on file   Social Connections:     Frequency of Communication with Friends and Family: Not on file    Frequency of Social Gatherings with Friends and Family: Not on file    Attends Restorationist Services: Not on file    Active Member of Clubs or Organizations: Not on file    Attends Club or Organization Meetings: Not on file    Marital Status: Not on file   Intimate Partner Violence:     Fear of Current or Ex-Partner: Not on file    Emotionally Abused: Not on file    Physically Abused: Not on file    Sexually Abused: Not on file   Housing Stability:     Unable to Pay for Housing in the Last Year: Not on file    Number of Places Lived in the Last Year: Not on file    Unstable Housing in the Last Year: Not on file            Physical Examination:  /76   Pulse 87   Ht 5' 10\" (1.778 m)   Wt 148 lb (67.1 kg)   BMI 21.24 kg/m²   Physical Exam  Vitals reviewed. Constitutional:       Appearance: He is well-developed. Neck:      Vascular: No carotid bruit or JVD. Cardiovascular:      Rate and Rhythm: Normal rate and regular rhythm. Heart sounds: Normal heart sounds. No murmur heard. No friction rub. No gallop. Pulmonary:      Effort: Pulmonary effort is normal. No respiratory distress. Breath sounds: Normal breath sounds. No wheezing or rales. Abdominal:      General: There is no distension. Tenderness: There is no abdominal tenderness. Lymphadenopathy:      Cervical: No cervical adenopathy. Skin:     General: Skin is warm and dry.                ASSESSMENT:       Diagnosis Orders   1. Ischemic cardiomyopathy      2. Coronary artery disease involving native coronary artery of native heart without angina pectoris      3. Systolic congestive heart failure, unspecified HF chronicity (HCC)      4. Essential hypertension      5. ESPINOSA (dyspnea on exertion)      6. Paroxysmal atrial fibrillation (HCC)      7. Chronic anticoagulation            PLAN:  No orders of the defined types were placed in this encounter.       Encounter Medications    No orders of the defined types were placed in this encounter.            1. Continue present medications  2. Recommend cardiac event monitor  3. Recommend follow-up assessment in 3 months  4. Suggest Coreg 3.125 mg p.o. twice daily  5. Discussed with the patient he clearly would benefit from ICD implantation after discussion they are agreeable to proceeding we will arrange as soon as feasible.   I have discussed with the patient regarding indications for the proposed procedure ICD/ Pacemaker implantation along with possible alternatives benefits and risks including but not limited to risks of death, myocardial infarction, stroke, contrast induced nephropathy which in some cases may lead to acute kidney failure requiring dialysis, allergic reactions, infections which may require treatment with antibiotics or removal of the device, bleeding requiring blood transfusion,  cardiac arrhthymias, respiratory failure which may require placing the patient on respiratory support such as a ventilator or breathing machine,risk of complications which may require vascular surgery,risks of collapsing the lung with development of a pneumothorax which may require placement of a chest tube, and risks of lead dislodgement which may require follow up surgery and repositioning of the leads. In addition there are long term risks including infection, and device or component failure which may require removal and/or replacement of various components. Risk of wound nonhealing t subsequent erosion etc. also discussed. We also discussed the risk of potential stroke or thromboembolic phenomena during the timeframe that the patient may be off off of anticoagulation. The patient is advised the device battery will eventually become depleted and require replacement at some point. The patient is awake and alert and understands the issues involved and indicates willingness to proceed as ordered.     The patient is  a reasonable candidate for moderate conscious sedation.     ASA score:  ASA 3 - Patient with moderate systemic disease with functional limitations     Mallampati: I (soft palate, uvula, fauces, tonsillar pillars visible)     Preferred vascular access site will be: left subclavian vein.           Return in about 3 months (around 3/2/2022) for return to Dr. Mello Denny only.        Robin Myers MD 12/2/2021 1:29 PM [facility]        Thisdictation was generated by voice recognition computer software.   Although all attempts are made to edit the dictation for accuracy, there may be errors in the transcription that are not intended.                                No significant interval history change since above visit

## 2021-12-30 PROBLEM — Z95.810 ISCHEMIC CARDIOMYOPATHY WITH IMPLANTABLE CARDIOVERTER-DEFIBRILLATOR (ICD): Status: ACTIVE | Noted: 2021-01-01

## 2021-12-30 PROBLEM — I25.5 ISCHEMIC CARDIOMYOPATHY WITH IMPLANTABLE CARDIOVERTER-DEFIBRILLATOR (ICD): Status: ACTIVE | Noted: 2021-01-01

## 2021-12-30 PROBLEM — Z51.5 PALLIATIVE CARE PATIENT: Status: ACTIVE | Noted: 2021-01-01

## 2021-12-30 PROBLEM — E44.0 MODERATE MALNUTRITION (HCC): Status: ACTIVE | Noted: 2021-01-01

## 2021-12-30 NOTE — CARE COORDINATION
Date / Time of Evaluation:   12/30/2021    2:56 PM  Assessment Completed by:   Meenakshi Murillo RN      Patient Name:   Meghna Mondragon  MRN:   [id number]  YOB: 1950    Patient Admission Status:   Observation [104]    Patient Contact Information:    [address]  [phone] (home)   Telephone Information:   Mobile [phone]     Above information verified? [x]   Yes  []   No    (Best Practice:   Have patient/caregiver verify above address and phone number by stating out loud their current address and reachable phone number. Initial Assessment Completed at bedside with:      [x]   Patient  []   Family/Caregiver/Guardian   []   Other:      Current PCP:    LO Rodriguez CNP    PCP verified? [x]   Yes  []   No    Emergency Contacts:    Extended Emergency Contact Information  Primary Emergency Contact: [place] [address] Phone: [phone]  Mobile Phone: [phone]  Relation: Child  Secondary Emergency Contact: Rose Brown  Address: [place] [address], [address] Phone: [phone]  Mobile Phone: [phone]  Relation: Spouse    Advance Directives:    Does Mr. TIAGO HUITRON have an advance directive in his electronic medical record? []   Yes  [x]   No    Code Status:   Full Code      Have you been vaccinated for COVID-19 (SARS-CoV-2)? []   Yes  []   No                   If so, when?     Which :         []   Pfizer-BioNTech  [x]   Moderna x 2 no booster  []   [employer]  []   Other:       Do you have any of the following unmet social needs that would keep you from returning home safely:    [x]   Yes  []   No                    Unmet Social Needs:           [x]   Living Situation/Housing  [x]   Food  []   Stroke Education   []   Utilities  [x]   Personal Safety  []   Financial Strain  []   Employment  []   Mental Health  []   Substance Abuse  [x]   Transportation Barriers    Financial:    Payor: MEDICARE / Plan: MEDICARE PART A AND B / Product Type: *No Product type* /     Pre-Cert required for SNF:     []   Yes  [x]   No    Have Long Term Care Insurance:      []   Yes  [x]   No      Pharmacy:    [facility] [address] [place] [address]  Phone: [phone] Fax: [phone]    Potential assistance purchasing medications? []   Yes  [x]   No      ADLS:   Pt lives with spouse who apparently does the cooking and some cleaning. Spouse has cellulites of legs and has HC for dsg changes. Pt is not able to care for himself very well. He is very weak appearing. Pt says he uses a walker in the home, and w/c outside of home. Support System:   Children, 2 adult dtrs, one in [place] [address] and the other in [place]      Current Home Environment:       Steps:       [x]   Yes  []   No    If yes, how many?  Ramp    Plans to RETURN to current housing: pt appears to need SNF prior to returning home    [x]   Yes  []   No    Barriers to RETURNING to current housing:  Pt is very weak and decondiditoned      Currently ACTIVE with Home Health CARE:      [x]   Yes  []   No    Home Health Care Agency:  [facility] [address] [place] [address] [facility].    DME Provider:   Walker/wheelchair      Had HOME OXYGEN prior to admission:      []   Yes  [x]   No      Has a pulse oximetry unit at home:     [x]   Yes spoise has a pulse ox  []   No      Active with HD/PD prior to admission:             []   Yes  [x]   No      Transition Plan:  SNF if patient is agreeable    Transportation PLAN for Discharge:  ambulance    Factors facilitating achievement of predicted outcomes: medically stable    Barriers to discharge:  ICD placement, d/c 12/13, ischemic cardiomyopathy      Patient Deficits:    [x]   Yes generalized weakness  []   No    [place] Coma Scale  Eye Opening: Spontaneous  Best Verbal Response: Oriented  Best Motor Response: Obeys commands  Gordon Coma

## 2021-12-30 NOTE — PROGRESS NOTES
R pacer ICD site bruised, oozing on dressing noted. Soft hematoma at site. Bruising noted to axilla area. Pulses palpable. L chest some bruising noted and soft hematoma from attempt ICD. Pulses palpable. Lungs CTA. Pt denies any feeling of SOA. HR sinus, S1 S2 noted. Lower extremeties, cool to touch and reddish purple areas noted on feet. Pt and daughter state this is normal. Pt states has neuropathy. Pt instructed on post pacer orders and use of sling. Pt requested external catheter for tonight as he is on bedrest. Will place as requested. Admission assessment completed.  Electronically signed by Jason Shelton RN on 12/29/2021 at 7:36 PM

## 2021-12-30 NOTE — CONSULTS
Palliative care support visit. Pt is current with HH. Pt on obs status for pacemaker placement which was done yesterday,  Bruising around insertion site noted. Pt denies pain unless \"you mash on it\". Pt tells me he lives at home with his sposue. They do not have much support, each other only Children do not live nearby per pt. Pt reports his wife is on [place], RN comes 3 x week. Pt states he has [place] and PT is to work with him for amb. He believes he will be dc'd today to go home. He is sitting up in bed having lunch. No needs voiced.   Electronically signed by Donte Beard RN on 12/30/2021 at 1:44 PM

## 2021-12-30 NOTE — DISCHARGE SUMMARY
Discharge Summary    Mathew Umaña  :  1950  MRN:  [id number]    Admit date:  2021  Discharge date:      Admitting Physician:  Gina Schroeder MD    Advance Directive: Full Code    Consults: none    Primary Care Physician:  Brea Tian, APRN - CNP    Discharge Diagnoses: Active Problems:    CHF (congestive heart failure), NYHA class I, acute on chronic, combined ([facility]    Palliative care patient    Ischemic cardiomyopathy with implantable cardioverter-defibrillator (ICD)  Resolved Problems:    * No resolved hospital problems. *      Cardiology Specific Data:  Specialty Problems        Cardiology Problems    Coronary artery disease        Hypercholesterolemia        Systolic congestive heart failure (HCC)        Ischemic cardiomyopathy        Paroxysmal atrial fibrillation (HCC)        A-fib (HCC)        Acute on chronic systolic heart failure (HCC)        CHF (congestive heart failure), NYHA class I, acute on chronic, combined ([facility]        Essential hypertension        Acute decompensated heart failure (HCC)        Atrial fibrillation with RVR (HCC)        Ischemic cardiomyopathy with implantable cardioverter-defibrillator (ICD)              Significant Diagnostic Studies:   XR CHEST PORTABLE    Result Date: 2021  XR CHEST PORTABLE 2021 3:13 PM History: Pacemaker placement. Pneumothorax evaluation. Portable chest x-ray. Comparison is made with a chest x-ray from 2021. Magnified heart size. Normal appearance of a right side 2-lead cardiac pacer. The lungs are fully expanded and clear. No pneumothorax. 1. Right cardiac pacer now present. 2. No pneumothorax.  Signed by Dr Devante Wilson    VL Extremity Venous Bilateral    Result Date: 2021  Vascular Upper Extremities Veins Procedure  Demographics   Patient Name    Nikita Smoke    Age                  66                  [facility].   Patient Number  [id number]         Gender               Male   Visit Number    [id number] Interpreting         Ana Rosa Witt MD                                 Physician   Date of Birth   1950     Referring Physician  Mike Olson MD   Accession       [id number]     [address], RVT,  Number                                              RDMS  Procedure Type of Study:   Veins:Upper Extremities Veins, VASC EXTREMITY VENOUS DUPLEX BILATERAL. Indications for Study:Pacemaker. Risk Factors   - The patient's last creatinine was 1.2 mg/dl. Allergies   - Phenergan. Impression   Successful ultrasound/duplex guided cannulation of right subclavian vein  for pacemaker placement. Initially, left subclavian access attempted with ultrasound/duplex  (unsuccessful). Signature   ----------------------------------------------------------------  Electronically signed by Ana Rosa Witt MD(Interpreting  physician) on 12/29/2021 04:05 PM  ----------------------------------------------------------------        Pertinent Labs:   CBC:   Recent Labs     12/29/21  0740 12/30/21  0744   WBC 5.3 6.9   HGB 14.4 11.8*    133     BMP:    Recent Labs     12/29/21  0740 12/30/21  0744    136   K 5.1* 5.1*    104   CO2 26 22   BUN 23 22   CREATININE 1.2 1.1   GLUCOSE 185* 118*     INR: No results for input(s): INR in the last 72 hours. Lipids: No results for input(s): CHOL, HDL in the last 72 hours. Invalid input(s): LDLCALCU  ABGs:No results for input(s): PHART, LPU1LCV, PO2ART, SFI6OEE, BEART, HGBAE, A8MMYOZQ, CARBOXHGBART, 02THERAPY in the last 72 hours. HgBA1c:  No results for input(s): LABA1C in the last 72 hours. Procedures: Dual-chamber ICD implant    Hospital Course: Patient was admitted yesterday for elective dual-chamber ICD implant which was performed in the cardiac catheterization laboratory.   We initially attempted access on the left subclavian vein but the vein appeared to be occluded by venography therefore we changed to the right side the procedure per was uneventful from that point forward. He was observed overnight today doing well no hematoma observed. Postprocedural chest x-ray satisfactory no evidence of pneumothorax or other complications. The device was interrogated today functioning appropriately. I personally gave him wound care instructions. He is now felt to be stable and appropriate for discharge discharged in improved condition follow-up in the office as directed in 8 to 9 days. Physical Exam:    Vital Signs: /71   Pulse 68   Temp 98.2 °F (36.8 °C) (Oral)   Resp 18   Ht 5' 10\" (1.778 m)   Wt 145 lb (65.8 kg)   SpO2 99%   BMI 20.81 kg/m²     Physical Exam      Discharge Medications:         Medication List      CHANGE how you take these medications    apixaban 5 MG Tabs tablet  Commonly known as: ELIQUIS  Take 1 tablet by mouth 2 times daily Resume in 3 days  What changed: additional instructions        CONTINUE taking these medications    amiodarone 200 MG tablet  Commonly known as: CORDARONE     aspirin EC 81 MG EC tablet  Take 1 tablet by mouth daily     carvedilol 3.125 MG tablet  Commonly known as: COREG  Take 1 tablet by mouth 2 times daily     clopidogrel 75 MG tablet  Commonly known as: PLAVIX  Take 1 tablet by mouth daily     COMPLETE SENIOR PO     furosemide 40 MG tablet  Commonly known as: LASIX  Take 1 tablet by mouth 2 times daily     gabapentin 100 MG capsule  Commonly known as: Neurontin  Take 1 capsule by mouth 3 times daily for 30 days.      HYDROcodone-acetaminophen 7.5-325 MG per tablet  Commonly known as: NORCO     hydrocortisone 2.5 % cream     Januvia 25 MG tablet  Generic drug: SITagliptin     lisinopril 10 MG tablet  Commonly known as: PRINIVIL;ZESTRIL     Narcan 4 MG/0.1ML Liqd nasal spray  Generic drug: naloxone     potassium chloride 20 MEQ extended release tablet  Commonly known as: KLOR-CON M  Take 1 tablet by mouth 2 times daily     Sleep Aid 25 MG tablet  Generic drug: doxyLAMINE succinate           Where to Get Your Medications      These medications were sent to  [facility]  [address]    Phone: [phone]   · apixaban 5 MG Tabs tablet         Discharge Instructions:   Zeenat Maria MD  [address]  [phone]    On 1/3/2022  2:30pm ICD follow up appointment        Take medications as directed. Resume activity as tolerated. Diet: ADULT DIET;  Regular; 4 carb choices (60 gm/meal); NO CHICKEN  Likes softer easy to chew foods     Disposition: Patient is medically stable and will be discharged *    Miguel Flores MD, 12/30/2021 1:00 PM

## 2021-12-30 NOTE — CARE COORDINATION
Patient has provided Code 44 letter to patient. CM answered all of questions and concerns. Patient's Name: Arianna CABELLO  Account Number: [de-identified]  [facility] Date: 12/29/2021    Date: 12/30/2021    Medicare requires your physician and hospital to determine the correct billing status for your hospital stay. This billing status is based upon your diagnosis, the severity of your condition, the intensity of the services required to treat your condition, the expected length of your hospital stay, and guidelines set by Medicare. Based on the above criteria, we have determined that the correct billing status for your current hospital stay should be Outpatient instead of Inpatient. While your condition may not warrant an Inpatient admission, it does require observation by health care professionals. Therefore, you are not admitted to the hospital as an Inpatient, but are instead an Outpatient under observation. Your physician has been made aware and is in agreement. This determination will mean:      Your hospital stay has an Outpatient billing status, even though you are in a regular hospital bed and may receive some of the same services as a patient whose hospital stay has an Inpatient billing status.  The Outpatient status of your hospital stay may affect Medicare coverage of post-acute care, e.g., care provided at a skilled nursing facility or your home, or other community-based care upon your discharge from the hospital.  For instance, your Outpatient stay does not count toward the three-day Inpatient stay requirement for admission to a skilled nursing facility.  The Outpatient status of your hospital stay may affect Medicare coverage of your current hospital services, including medications and pharmaceutical supplies. Medicare Part A does not cover Outpatient observation services.   You may be liable for some charges on your bill if they are not covered under Medicare Part B or if you do not have Medicare Part B.     You will be responsible for your yearly Medicare Part B deductible and any required cost-sharing. You may also be billed for self-administered medications.  If you have other or additional insurance coverage, such as Medicaid or private health insurance, the Outpatient billing status of your hospital stay may similarly affect such coverage. If you have questions concerning Medicare guidelines for determining whether a hospital stay must be assigned Inpatient or Outpatient status, or about your cost-sharing responsibilities under the Medicare program, please contact Medicare at 1-800-MEDICARE (183-810-3784) TTY users should call [phone] or Ryder Fisher at [phone], [address]. If you have financial or billing questions please contact a [address] [place] [address] Representative at [phone] or your insurance carrier.     Electronically signed by Madeleine Morris RN on 12/30/2021 at 1:39 PM

## 2021-12-30 NOTE — CARE COORDINATION
CM outreached to Spouse to advise of PT evaluation and recommendation that patient go to a SNF for rehab prior to coming home. Spouse is in agreement and states, she will \"be fine\" at home without spouse. Spouse reports, she does have COPD, wears 02, and has HC nursing 3 x week. CM outreached to Aaron, daughter, who is agreeable to pt having rehab at discharge also Juliette's would like patient closer to her, and requests [facility] or [facility]. CM emphasized this would be a good time to talk w/parents about STEPAN/LTC. Dtr reports, they have had such conversations. CM advised dtr on detention in [place] and [place].  CM encourage dtr to look into parent's eligibility for Medicaid. CM contacted Lindsay at [facility], may have a bed a [facility], but will not know until 12/31/2021. CM has contact [facility] and advised of referral being faxed.    Electronically signed by Tran Rivera RN on 12/30/2021 at 5:05 PM

## 2021-12-30 NOTE — PLAN OF CARE
Nutrition Problem #1: Inadequate oral intake,Moderate malnutrition  Intervention: Food and/or Nutrient Delivery: Modify Current Diet  Nutritional Goals: po intake 50% or greater. Weight stable or increase 1-3# per week.   Glucose 150 or less

## 2021-12-30 NOTE — CARE COORDINATION
Patient is current with [facility] for [address]. Will follow. Please advise when patient discharges. WILL NEED RESUMPTION OF CARE ORDERS TO RESUME HH SERVICES WHEN PATIENT DISCHARGES. Thank you. [address] [place] [address] [phone]. [phone].     Diana Dempsey RN, Home Care Liaison  [phone] P  Electronically signed by Diana Dempsey on 12/30/2021 at 8:35 AM

## 2021-12-30 NOTE — PROGRESS NOTES
Physical Therapy    Facility/Department: [facility] PROGRESSIVE CARE  Initial Assessment    NAME: Deacon Knowles  : 1950  MRN: [id number]    Date of Service: 2021    Discharge Recommendations:  Continue to assess pending progress,Patient would benefit from continued therapy after discharge        Assessment   Body structures, Functions, Activity limitations: Decreased functional mobility ; Decreased ADL status; Decreased strength;Decreased endurance;Decreased balance  Assessment: Pt STOOD EOB AND ATTEMPTED TO STEP BUT VERY UNSTEADY. INCONTINENT OF URINE. WILL LIKELY IMPROVE USING A WALKER BUT WOULD STILL HAVE GREAT DIFFICULTY WITH SELF-CARE. DOES NOT APPEAR TO BE SAFE TO RETURN HOME AT THIS LEVEL. PT Education: PT Role;Plan of Care  REQUIRES PT FOLLOW UP: Yes  Activity Tolerance  Activity Tolerance: Patient limited by fatigue       Patient Diagnosis(es): The encounter diagnosis was Pacemaker. has a past medical history of Arthritis, Asthma, Cancer ([facility] [address] Coronary artery disease, Diabetes mellitus ([address] Hypercholesterolemia, Hypertension, Hypothyroidism, Pancreatitis, Rheumatic fever, and Systolic congestive heart failure ([facility] [address] has a past surgical history that includes Appendectomy; Cholecystectomy; and Pancreas surgery.     Restrictions  Restrictions/Precautions  Restrictions/Precautions: Fall Risk  Position Activity Restriction  Other position/activity restrictions: PUREWICK, POST-PACMAKER PRECAUTIONS  Vision/Hearing        Subjective  General  Diagnosis: ISCHEMIC CARDIOMYOPATHY, S/P PACEMAKER  Subjective  Subjective: Pt WILLING TO PARTICIPATE  Pain Screening  Patient Currently in Pain: Denies  Vital Signs  Patient Currently in Pain: Denies       Orientation  Orientation  Overall Orientation Status: Within Normal Limits  Social/Functional History  Social/Functional History  Lives With: Spouse  Bathroom Equipment: Shower chair  Home Equipment: Rolling walker,Wheelchair-manual  ADL Assistance: Independent (\"dressing is fay hard\")  Ambulation Assistance: Independent (RW in house, w/c in community)  Transfer Assistance: Independent  Active : No  Cognition   Cognition  Overall Cognitive Status: WNL    Objective     Observation/Palpation  Posture: Fair    AROM RLE (degrees)  RLE AROM: WFL  AROM LLE (degrees)  LLE AROM : WFL  Strength RLE  Comment: GROSSLY 4/5  Strength LLE  Comment: GROSSLY 4/5        Bed mobility  Supine to Sit: Minimal assistance  Sit to Supine: Minimal assistance  Transfers  Sit to Stand: Moderate Assistance  Stand to sit: Moderate Assistance  Comment: STOOD X 2 EOB. TOOK 2 SMALL SIDE STEPS WITH HHA MIN-MOD ASSIST.   Ambulation  Ambulation?: No     Balance  Sitting - Dynamic: Good;-  Standing - Dynamic: Fair;-        Plan   Plan  Times per week: AT LEAST 5-6  Current Treatment Recommendations: Strengthening,Balance Training,Functional Mobility Training,Gait Training,Transfer Training,Safety Education & Training,Patient/Caregiver Education & Training  Safety Devices  Type of devices: Bed alarm in place,Call light within reach    G-Code       OutComes Score                                                  AM-PAC Score             Goals  Short term goals  Time Frame for Short term goals: 14 DAYS  Short term goal 1: BED MOB MOD IND  Short term goal 2: TRANSFERS MOD IND  Short term goal 3: AMB 50' RW MOD IND       Therapy Time   Individual Concurrent Group Co-treatment   Time In           Time Out           Minutes                   Familia Maurice, PT

## 2021-12-30 NOTE — PROGRESS NOTES
Consult received. Will place Palliative care evaluation and Palliative RN/ will initiate discussions. We will follow along and assist as needed. Thank you for allowing us to participate in the care of this patient.     Livan Vinson PA-C

## 2021-12-30 NOTE — PROGRESS NOTES
Comprehensive Nutrition Assessment    Type and Reason for Visit:  Initial,Positive Nutrition Screen    Nutrition Recommendations/Plan: follow for po intake and need for ONS    Nutrition Assessment:  Pt meets criteria for moderate malnutrition AEB fat and muscle mass depletion. To modify current diet order to include Carb Control d/t elevated glucose and hx-DM. Pt has had an 8.3% weight decrease in 1 month. Malnutrition Assessment:  Malnutrition Status: Moderate malnutrition    Context:  Acute Illness     Findings of the 6 clinical characteristics of malnutrition:  Energy Intake:  Unable to assess  Weight Loss:  7 - Greater than 5% over 1 month     Body Fat Loss:  1 - Mild body fat loss     Muscle Mass Loss:  1 - Mild muscle mass loss    Fluid Accumulation:  No significant fluid accumulation Extremities   Strength:  Not Performed    Estimated Daily Nutrient Needs:  Energy (kcal):  8781-5184 kcals (25-30 kcals/kg); Weight Used for Energy Requirements:  Current     Protein (g):  65-130g; Weight Used for Protein Requirements:  Current        Fluid (ml/day):  0144-4888 ml; Method Used for Fluid Requirements:         Nutrition Related Findings:  thin appearing      Wounds:  None       Current Nutrition Therapies:    ADULT DIET;  Regular; 4 carb choices (60 gm/meal); NO CHICKEN  Likes softer easy to chew foods    Anthropometric Measures:  · Height: 5' 10\" (177.8 cm)  · Current Body Weight: 145 lb (65.8 kg)   · Admission Body Weight: 145 lb (65.8 kg)    · Usual Body Weight: 158 lb (71.7 kg) (11/2021)     · Ideal Body Weight: 166 lbs; % Ideal Body Weight 87.3 %   · BMI: 20.8  · Adjusted Body Weight:  ; No Adjustment   · BMI Categories: Underweight (BMI less than 22) age over 72       Nutrition Diagnosis:   · Inadequate oral intake,Moderate malnutrition related to acute injury/trauma as evidenced by weight loss greater than or equal to 5% in 1 month,mild muscle loss,mild loss of subcutaneous fat,lab values      Nutrition Interventions:   Food and/or Nutrient Delivery:  Modify Current Diet  Nutrition Education/Counseling:  No recommendation at this time   Coordination of Nutrition Care:  Continue to monitor while inpatient    Goals:  po intake 50% or greater. Weight stable or increase 1-3# per week.   Glucose 150 or less       Nutrition Monitoring and Evaluation:   Behavioral-Environmental Outcomes:  None Identified   Food/Nutrient Intake Outcomes:  Diet Advancement/Tolerance,Food and Nutrient Intake,Supplement Intake  Physical Signs/Symptoms Outcomes:  Biochemical Data,Weight,Skin,Nutrition Focused Physical Findings,Fluid Status or Edema     Discharge Planning:    Continue Oral Nutrition Supplement,Too soon to determine     Electronically signed by Anice Osgood, MS, RD, LD on 12/30/21 at 1:21 PM CST    Contact: [phone]

## 2021-12-31 NOTE — PROGRESS NOTES
Cassie [place], LPN called back . Will need paper prescription for Hydrocodone-acetaminophen 7.5-325mg, and Gabapentin 100 mg TID for refill. Otherwise, pt won't have the meds till Tuesday(Dec, 4th). Attempted to call in the prescription at [facility]( with [facility]). The pharmacist replied it has to be physician's call in or paper scrip due to the meds are C2( Narcotics). Will refer this to Dr. Antolin Solis per Charge nurse.

## 2021-12-31 NOTE — PROGRESS NOTES
Dr. Christina Barbosa notified that [address], used by [facility], requesting that he personally call in narcotic scripts. Phone number provided to Dr. Christina Barbosa for [address] ([phone]). According to  Dr. Christina Barbosa at 13:43 script for Norco 7.5mg BID PRN and gabapentin 100mg TID have been called into [address].

## 2021-12-31 NOTE — PROGRESS NOTES
Cardiology patient discharge orders given yesterday but apparently after further discussion it was felt that he might benefit from referral to a rehab center and that assessment is undergoing at the present time. Clinically stable no change in his condition. If bed available could be discharged today or alternatively discharged to home. Awaiting final determination.

## 2021-12-31 NOTE — PLAN OF CARE
Problem: Falls - Risk of:  Goal: Will remain free from falls  Description: Will remain free from falls  12/31/2021 0318 by Mukund Cornejo RN  Outcome: Ongoing  12/30/2021 1922 by Evelyn Oneil RN  Outcome: Ongoing  Goal: Absence of physical injury  Description: Absence of physical injury  12/31/2021 0318 by Mukund Cornejo RN  Outcome: Ongoing  12/30/2021 1922 by Evelyn Oneil RN  Outcome: Ongoing     Problem: Skin Integrity:  Goal: Will show no infection signs and symptoms  Description: Will show no infection signs and symptoms  12/31/2021 0318 by Mukund Cornejo RN  Outcome: Ongoing  12/30/2021 1922 by Evelyn Oneil RN  Outcome: Ongoing  Goal: Absence of new skin breakdown  Description: Absence of new skin breakdown  12/31/2021 0318 by Mukund Cornejo RN  Outcome: Ongoing  12/30/2021 1922 by Evelyn Oneil RN  Outcome: Ongoing     Problem: Nutrition  Goal: Optimal nutrition therapy  12/31/2021 0318 by Mukund Cornejo RN  Outcome: Ongoing  12/30/2021 1922 by Evelyn Oneil RN  Outcome: Ongoing

## 2021-12-31 NOTE — PROGRESS NOTES
Report called to Cite Haseeb Walker LPN at [facility]. DC summary and AVS faxed to the facility. Pt will be transferred by daughter.

## 2021-12-31 NOTE — CARE COORDINATION
Per Sindy Grimaldo able to offer bed today. [facility]  P [phone]  F [phone]    Called patient's spouse. She requested that this CM call her daughter, [facility]. [facility] wanted to know Asif's response to referral before making decision. Called [facility]. Their facility will not be able to accept patient before Monday. Informed [facility]. She is agreeable to accept bed offer from [facility]. [facility] is available to transport patient.   Electronically signed by Alycia Mcclellan RN on 12/31/2021 at 9:28 AM

## 2022-01-01 ENCOUNTER — TELEPHONE (OUTPATIENT)
Dept: VASCULAR SURGERY | Age: 72
End: 2022-01-01

## 2022-01-01 ENCOUNTER — TELEPHONE (OUTPATIENT)
Dept: CARDIOLOGY CLINIC | Age: 72
End: 2022-01-01

## 2022-01-01 ENCOUNTER — OFFICE VISIT (OUTPATIENT)
Dept: VASCULAR SURGERY | Age: 72
End: 2022-01-01
Payer: MEDICARE

## 2022-01-01 ENCOUNTER — HOSPITAL ENCOUNTER (OUTPATIENT)
Dept: VASCULAR LAB | Age: 72
Discharge: HOME | End: 2022-08-11
Payer: MEDICARE

## 2022-01-01 ENCOUNTER — APPOINTMENT (OUTPATIENT)
Dept: CT IMAGING | Age: 72
DRG: 372 | End: 2022-01-01
Payer: MEDICARE

## 2022-01-01 ENCOUNTER — NURSE ONLY (OUTPATIENT)
Dept: CARDIOLOGY CLINIC | Age: 72
End: 2022-01-01

## 2022-01-01 ENCOUNTER — OFFICE VISIT (OUTPATIENT)
Dept: CARDIOLOGY CLINIC | Age: 72
End: 2022-01-01
Payer: MEDICARE

## 2022-01-01 ENCOUNTER — HOSPITAL ENCOUNTER (OUTPATIENT)
Dept: MRI IMAGING | Age: 72
Discharge: HOME | End: 2022-06-09
Payer: MEDICARE

## 2022-01-01 ENCOUNTER — HOSPITAL ENCOUNTER (INPATIENT)
Age: 72
LOS: 6 days | Discharge: NURSING, REHAB OR LONG-TERM CARE | DRG: 372 | End: 2022-05-02
Attending: EMERGENCY MEDICINE | Admitting: INTERNAL MEDICINE
Payer: MEDICARE

## 2022-01-01 VITALS
HEART RATE: 66 BPM | TEMPERATURE: 96.6 F | OXYGEN SATURATION: 99 % | DIASTOLIC BLOOD PRESSURE: 78 MMHG | RESPIRATION RATE: 18 BRPM | SYSTOLIC BLOOD PRESSURE: 139 MMHG

## 2022-01-01 VITALS
TEMPERATURE: 98.1 F | SYSTOLIC BLOOD PRESSURE: 140 MMHG | HEART RATE: 60 BPM | BODY MASS INDEX: 20.04 KG/M2 | HEIGHT: 70 IN | RESPIRATION RATE: 16 BRPM | DIASTOLIC BLOOD PRESSURE: 68 MMHG | WEIGHT: 140 LBS | OXYGEN SATURATION: 95 %

## 2022-01-01 VITALS
HEIGHT: 70 IN | WEIGHT: 145 LBS | BODY MASS INDEX: 20.76 KG/M2 | HEART RATE: 70 BPM | SYSTOLIC BLOOD PRESSURE: 100 MMHG | DIASTOLIC BLOOD PRESSURE: 58 MMHG

## 2022-01-01 VITALS
SYSTOLIC BLOOD PRESSURE: 78 MMHG | OXYGEN SATURATION: 96 % | TEMPERATURE: 96.6 F | HEART RATE: 62 BPM | DIASTOLIC BLOOD PRESSURE: 54 MMHG | RESPIRATION RATE: 18 BRPM

## 2022-01-01 VITALS
HEIGHT: 70 IN | HEART RATE: 65 BPM | DIASTOLIC BLOOD PRESSURE: 62 MMHG | WEIGHT: 145 LBS | BODY MASS INDEX: 20.76 KG/M2 | SYSTOLIC BLOOD PRESSURE: 110 MMHG | OXYGEN SATURATION: 99 %

## 2022-01-01 VITALS
WEIGHT: 140 LBS | BODY MASS INDEX: 20.04 KG/M2 | HEIGHT: 70 IN | HEART RATE: 61 BPM | SYSTOLIC BLOOD PRESSURE: 112 MMHG | DIASTOLIC BLOOD PRESSURE: 60 MMHG

## 2022-01-01 DIAGNOSIS — I48.0 PAROXYSMAL ATRIAL FIBRILLATION (HCC): Primary | ICD-10-CM

## 2022-01-01 DIAGNOSIS — Z92.29 H/O AMIODARONE THERAPY: ICD-10-CM

## 2022-01-01 DIAGNOSIS — I25.5 ISCHEMIC CARDIOMYOPATHY: ICD-10-CM

## 2022-01-01 DIAGNOSIS — I50.43 CHF (CONGESTIVE HEART FAILURE), NYHA CLASS I, ACUTE ON CHRONIC, COMBINED (HCC): Primary | ICD-10-CM

## 2022-01-01 DIAGNOSIS — E78.00 HYPERCHOLESTEROLEMIA: ICD-10-CM

## 2022-01-01 DIAGNOSIS — I10 ESSENTIAL HYPERTENSION: ICD-10-CM

## 2022-01-01 DIAGNOSIS — R19.7 NAUSEA VOMITING AND DIARRHEA: ICD-10-CM

## 2022-01-01 DIAGNOSIS — I48.0 PAROXYSMAL ATRIAL FIBRILLATION (HCC): ICD-10-CM

## 2022-01-01 DIAGNOSIS — Z79.01 CHRONIC ANTICOAGULATION: ICD-10-CM

## 2022-01-01 DIAGNOSIS — R94.39 ABNORMAL NUCLEAR STRESS TEST: ICD-10-CM

## 2022-01-01 DIAGNOSIS — Z95.810 ICD (IMPLANTABLE CARDIOVERTER-DEFIBRILLATOR), DUAL, IN SITU: ICD-10-CM

## 2022-01-01 DIAGNOSIS — I25.10 CORONARY ARTERY DISEASE INVOLVING NATIVE CORONARY ARTERY OF NATIVE HEART WITHOUT ANGINA PECTORIS: ICD-10-CM

## 2022-01-01 DIAGNOSIS — N17.0 ACUTE KIDNEY INJURY (AKI) WITH ACUTE TUBULAR NECROSIS (ATN) (HCC): Primary | ICD-10-CM

## 2022-01-01 DIAGNOSIS — R09.89 DECREASED PULSES IN FEET: ICD-10-CM

## 2022-01-01 DIAGNOSIS — Z95.810 ICD (IMPLANTABLE CARDIOVERTER-DEFIBRILLATOR), DUAL, IN SITU: Primary | ICD-10-CM

## 2022-01-01 DIAGNOSIS — I25.5 ISCHEMIC CARDIOMYOPATHY: Primary | ICD-10-CM

## 2022-01-01 DIAGNOSIS — E86.0 DEHYDRATION: ICD-10-CM

## 2022-01-01 DIAGNOSIS — R06.09 DOE (DYSPNEA ON EXERTION): ICD-10-CM

## 2022-01-01 DIAGNOSIS — I50.20 SYSTOLIC CONGESTIVE HEART FAILURE, UNSPECIFIED HF CHRONICITY (HCC): ICD-10-CM

## 2022-01-01 DIAGNOSIS — I50.42 CHRONIC COMBINED SYSTOLIC AND DIASTOLIC CONGESTIVE HEART FAILURE (HCC): ICD-10-CM

## 2022-01-01 DIAGNOSIS — M54.50 LOW BACK PAIN, UNSPECIFIED BACK PAIN LATERALITY, UNSPECIFIED CHRONICITY, UNSPECIFIED WHETHER SCIATICA PRESENT: ICD-10-CM

## 2022-01-01 DIAGNOSIS — I70.234 ATHSCL NATIVE ART OF RIGHT LEG W ULCER OF HEEL AND MIDFOOT (HCC): Primary | ICD-10-CM

## 2022-01-01 DIAGNOSIS — I70.235 ATHSCL NATIVE ARTERIES OF RIGHT LEG W ULCER OTH PRT FOOT (HCC): Primary | ICD-10-CM

## 2022-01-01 DIAGNOSIS — R11.2 NAUSEA VOMITING AND DIARRHEA: ICD-10-CM

## 2022-01-01 DIAGNOSIS — G25.2 OTHER SPECIFIED FORMS OF TREMOR: ICD-10-CM

## 2022-01-01 LAB
ACINETOBACTER CALCOAC BAUMANNII COMPLEX BY PCR: NOT DETECTED
ADENOVIRUS BY PCR: NOT DETECTED
ALBUMIN SERPL-MCNC: 4.2 G/DL (ref 3.5–5.2)
ALP BLD-CCNC: 78 U/L (ref 40–130)
ALT SERPL-CCNC: 29 U/L (ref 5–41)
ANION GAP SERPL CALCULATED.3IONS-SCNC: 10 MMOL/L (ref 7–19)
ANION GAP SERPL CALCULATED.3IONS-SCNC: 12 MMOL/L (ref 7–19)
ANION GAP SERPL CALCULATED.3IONS-SCNC: 14 MMOL/L (ref 7–19)
ANION GAP SERPL CALCULATED.3IONS-SCNC: 7 MMOL/L (ref 7–19)
ANION GAP SERPL CALCULATED.3IONS-SCNC: 8 MMOL/L (ref 7–19)
AST SERPL-CCNC: 25 U/L (ref 5–40)
BACTERIA: NEGATIVE /HPF
BACTEROIDES FRAGILIS BY PCR: NOT DETECTED
BASOPHILS ABSOLUTE: 0 K/UL (ref 0–0.2)
BASOPHILS RELATIVE PERCENT: 0.3 % (ref 0–1)
BASOPHILS RELATIVE PERCENT: 0.3 % (ref 0–1)
BASOPHILS RELATIVE PERCENT: 0.4 % (ref 0–1)
BASOPHILS RELATIVE PERCENT: 0.5 % (ref 0–1)
BASOPHILS RELATIVE PERCENT: 0.8 % (ref 0–1)
BASOPHILS RELATIVE PERCENT: 1 % (ref 0–1)
BASOPHILS RELATIVE PERCENT: 1.2 % (ref 0–1)
BILIRUB SERPL-MCNC: 0.6 MG/DL (ref 0.2–1.2)
BILIRUBIN URINE: NEGATIVE
BLOOD CULTURE, ROUTINE: ABNORMAL
BLOOD, URINE: NEGATIVE
BORDETELLA PARAPERTUSSIS BY PCR: NOT DETECTED
BORDETELLA PERTUSSIS BY PCR: NOT DETECTED
BUN BLDV-MCNC: 11 MG/DL (ref 8–23)
BUN BLDV-MCNC: 18 MG/DL (ref 8–23)
BUN BLDV-MCNC: 20 MG/DL (ref 8–23)
BUN BLDV-MCNC: 27 MG/DL (ref 8–23)
BUN BLDV-MCNC: 30 MG/DL (ref 8–23)
BUN BLDV-MCNC: 6 MG/DL (ref 8–23)
BUN BLDV-MCNC: 7 MG/DL (ref 8–23)
BUN BLDV-MCNC: 8 MG/DL (ref 8–23)
CALCIUM SERPL-MCNC: 8 MG/DL (ref 8.8–10.2)
CALCIUM SERPL-MCNC: 8.2 MG/DL (ref 8.8–10.2)
CALCIUM SERPL-MCNC: 8.5 MG/DL (ref 8.8–10.2)
CALCIUM SERPL-MCNC: 8.6 MG/DL (ref 8.8–10.2)
CALCIUM SERPL-MCNC: 9.2 MG/DL (ref 8.8–10.2)
CALCIUM SERPL-MCNC: 9.2 MG/DL (ref 8.8–10.2)
CANDIDA ALBICANS BY PCR: NOT DETECTED
CANDIDA AURIS BY PCR: NOT DETECTED
CANDIDA GLABRATA BY PCR: NOT DETECTED
CANDIDA KRUSEI BY PCR: NOT DETECTED
CANDIDA PARAPSILOSIS BY PCR: NOT DETECTED
CANDIDA TROPICALIS BY PCR: NOT DETECTED
CHLAMYDOPHILIA PNEUMONIAE BY PCR: NOT DETECTED
CHLORIDE BLD-SCNC: 100 MMOL/L (ref 98–111)
CHLORIDE BLD-SCNC: 101 MMOL/L (ref 98–111)
CHLORIDE BLD-SCNC: 106 MMOL/L (ref 98–111)
CHLORIDE BLD-SCNC: 109 MMOL/L (ref 98–111)
CHLORIDE BLD-SCNC: 110 MMOL/L (ref 98–111)
CHLORIDE BLD-SCNC: 96 MMOL/L (ref 98–111)
CHOLESTEROL, TOTAL: 194 MG/DL (ref 160–199)
CLARITY: CLEAR
CO2: 19 MMOL/L (ref 22–29)
CO2: 20 MMOL/L (ref 22–29)
CO2: 21 MMOL/L (ref 22–29)
CO2: 22 MMOL/L (ref 22–29)
CO2: 22 MMOL/L (ref 22–29)
CO2: 25 MMOL/L (ref 22–29)
CO2: 26 MMOL/L (ref 22–29)
CO2: 26 MMOL/L (ref 22–29)
COLOR: YELLOW
CORONAVIRUS 229E BY PCR: NOT DETECTED
CORONAVIRUS HKU1 BY PCR: NOT DETECTED
CORONAVIRUS NL63 BY PCR: NOT DETECTED
CORONAVIRUS OC43 BY PCR: NOT DETECTED
CREAT SERPL-MCNC: 0.7 MG/DL (ref 0.5–1.2)
CREAT SERPL-MCNC: 0.9 MG/DL (ref 0.5–1.2)
CREAT SERPL-MCNC: 1.3 MG/DL (ref 0.5–1.2)
CREAT SERPL-MCNC: 1.6 MG/DL (ref 0.5–1.2)
CREAT SERPL-MCNC: 1.8 MG/DL (ref 0.5–1.2)
CRYPTOCOCCUS NEOFORMANS/GATTII BY PCR: NOT DETECTED
CRYSTALS, UA: ABNORMAL /HPF
CULTURE, BLOOD 2: NORMAL
EKG P AXIS: NORMAL DEGREES
EKG P-R INTERVAL: NORMAL MS
EKG Q-T INTERVAL: 496 MS
EKG QRS DURATION: 120 MS
EKG QTC CALCULATION (BAZETT): 496 MS
EKG T AXIS: 96 DEGREES
ENTEROBACTER CLOACAE COMPLEX BY PCR: NOT DETECTED
ENTEROBACTERALES BY PCR: NOT DETECTED
ENTEROCOCCUS FAECALIS BY PCR: NOT DETECTED
ENTEROCOCCUS FAECIUM BY PCR: NOT DETECTED
EOSINOPHILS ABSOLUTE: 0 K/UL (ref 0–0.6)
EOSINOPHILS ABSOLUTE: 0 K/UL (ref 0–0.6)
EOSINOPHILS ABSOLUTE: 0.1 K/UL (ref 0–0.6)
EOSINOPHILS ABSOLUTE: 0.2 K/UL (ref 0–0.6)
EOSINOPHILS RELATIVE PERCENT: 0.6 % (ref 0–5)
EOSINOPHILS RELATIVE PERCENT: 0.6 % (ref 0–5)
EOSINOPHILS RELATIVE PERCENT: 2.1 % (ref 0–5)
EOSINOPHILS RELATIVE PERCENT: 2.2 % (ref 0–5)
EOSINOPHILS RELATIVE PERCENT: 3 % (ref 0–5)
EOSINOPHILS RELATIVE PERCENT: 3.6 % (ref 0–5)
EOSINOPHILS RELATIVE PERCENT: 3.8 % (ref 0–5)
EPITHELIAL CELLS, UA: 1 /HPF (ref 0–5)
ESCHERICHIA COLI BY PCR: NOT DETECTED
GFR AFRICAN AMERICAN: 45
GFR AFRICAN AMERICAN: 51
GFR AFRICAN AMERICAN: >59
GFR NON-AFRICAN AMERICAN: 37
GFR NON-AFRICAN AMERICAN: 43
GFR NON-AFRICAN AMERICAN: 54
GFR NON-AFRICAN AMERICAN: >60
GLUCOSE BLD-MCNC: 108 MG/DL (ref 74–109)
GLUCOSE BLD-MCNC: 112 MG/DL (ref 70–99)
GLUCOSE BLD-MCNC: 122 MG/DL (ref 74–109)
GLUCOSE BLD-MCNC: 129 MG/DL (ref 74–109)
GLUCOSE BLD-MCNC: 131 MG/DL (ref 70–99)
GLUCOSE BLD-MCNC: 136 MG/DL (ref 74–109)
GLUCOSE BLD-MCNC: 138 MG/DL (ref 70–99)
GLUCOSE BLD-MCNC: 142 MG/DL (ref 70–99)
GLUCOSE BLD-MCNC: 147 MG/DL (ref 70–99)
GLUCOSE BLD-MCNC: 150 MG/DL (ref 74–109)
GLUCOSE BLD-MCNC: 156 MG/DL (ref 70–99)
GLUCOSE BLD-MCNC: 161 MG/DL (ref 70–99)
GLUCOSE BLD-MCNC: 163 MG/DL (ref 70–99)
GLUCOSE BLD-MCNC: 172 MG/DL (ref 74–109)
GLUCOSE BLD-MCNC: 173 MG/DL (ref 74–109)
GLUCOSE BLD-MCNC: 182 MG/DL (ref 74–109)
GLUCOSE BLD-MCNC: 188 MG/DL (ref 70–99)
GLUCOSE BLD-MCNC: 193 MG/DL (ref 70–99)
GLUCOSE BLD-MCNC: 194 MG/DL (ref 70–99)
GLUCOSE BLD-MCNC: 195 MG/DL (ref 70–99)
GLUCOSE BLD-MCNC: 196 MG/DL (ref 70–99)
GLUCOSE BLD-MCNC: 197 MG/DL (ref 70–99)
GLUCOSE BLD-MCNC: 203 MG/DL (ref 70–99)
GLUCOSE BLD-MCNC: 215 MG/DL (ref 70–99)
GLUCOSE BLD-MCNC: 224 MG/DL (ref 70–99)
GLUCOSE BLD-MCNC: 232 MG/DL (ref 70–99)
GLUCOSE BLD-MCNC: 251 MG/DL (ref 70–99)
GLUCOSE URINE: NEGATIVE MG/DL
HAEMOPHILUS INFLUENZAE BY PCR: NOT DETECTED
HBA1C MFR BLD: 7.1 % (ref 4–6)
HCT VFR BLD CALC: 23.8 % (ref 42–52)
HCT VFR BLD CALC: 23.9 % (ref 42–52)
HCT VFR BLD CALC: 24.7 % (ref 42–52)
HCT VFR BLD CALC: 25.3 % (ref 42–52)
HCT VFR BLD CALC: 25.5 % (ref 42–52)
HCT VFR BLD CALC: 29.9 % (ref 42–52)
HCT VFR BLD CALC: 34.1 % (ref 42–52)
HDLC SERPL-MCNC: 49 MG/DL (ref 55–121)
HEMOGLOBIN: 10.4 G/DL (ref 14–18)
HEMOGLOBIN: 12 G/DL (ref 14–18)
HEMOGLOBIN: 8.5 G/DL (ref 14–18)
HEMOGLOBIN: 8.6 G/DL (ref 14–18)
HEMOGLOBIN: 8.7 G/DL (ref 14–18)
HEMOGLOBIN: 8.9 G/DL (ref 14–18)
HEMOGLOBIN: 9.3 G/DL (ref 14–18)
HUMAN METAPNEUMOVIRUS BY PCR: NOT DETECTED
HUMAN RHINOVIRUS/ENTEROVIRUS BY PCR: NOT DETECTED
HYALINE CASTS: 2 /HPF (ref 0–8)
IMMATURE GRANULOCYTES #: 0 K/UL
IMMATURE GRANULOCYTES #: 0.1 K/UL
INFLUENZA A BY PCR: NOT DETECTED
INFLUENZA B BY PCR: NOT DETECTED
KETONES, URINE: NEGATIVE MG/DL
KLEBSIELLA AEROGENES BY PCR: NOT DETECTED
KLEBSIELLA OXYTOCA BY PCR: NOT DETECTED
KLEBSIELLA PNEUMONIAE GROUP BY PCR: NOT DETECTED
LDL CHOLESTEROL CALCULATED: 125 MG/DL
LEUKOCYTE ESTERASE, URINE: NEGATIVE
LIPASE: 117 U/L (ref 13–60)
LISTERIA MONOCYTOGENES BY PCR: NOT DETECTED
LV EF: 33 %
LVEF MODALITY: NORMAL
LYMPHOCYTES ABSOLUTE: 0.8 K/UL (ref 1.1–4.5)
LYMPHOCYTES RELATIVE PERCENT: 11.7 % (ref 20–40)
LYMPHOCYTES RELATIVE PERCENT: 11.8 % (ref 20–40)
LYMPHOCYTES RELATIVE PERCENT: 15.4 % (ref 20–40)
LYMPHOCYTES RELATIVE PERCENT: 19.9 % (ref 20–40)
LYMPHOCYTES RELATIVE PERCENT: 20.5 % (ref 20–40)
LYMPHOCYTES RELATIVE PERCENT: 22.3 % (ref 20–40)
LYMPHOCYTES RELATIVE PERCENT: 24.7 % (ref 20–40)
MAGNESIUM: 1.7 MG/DL (ref 1.6–2.4)
MAGNESIUM: 1.9 MG/DL (ref 1.6–2.4)
MAGNESIUM: 2.1 MG/DL (ref 1.6–2.4)
MCH RBC QN AUTO: 32.1 PG (ref 27–31)
MCH RBC QN AUTO: 32.3 PG (ref 27–31)
MCH RBC QN AUTO: 32.4 PG (ref 27–31)
MCH RBC QN AUTO: 32.5 PG (ref 27–31)
MCH RBC QN AUTO: 32.5 PG (ref 27–31)
MCH RBC QN AUTO: 32.6 PG (ref 27–31)
MCH RBC QN AUTO: 32.8 PG (ref 27–31)
MCHC RBC AUTO-ENTMCNC: 34.8 G/DL (ref 33–37)
MCHC RBC AUTO-ENTMCNC: 34.9 G/DL (ref 33–37)
MCHC RBC AUTO-ENTMCNC: 35.2 G/DL (ref 33–37)
MCHC RBC AUTO-ENTMCNC: 35.2 G/DL (ref 33–37)
MCHC RBC AUTO-ENTMCNC: 35.7 G/DL (ref 33–37)
MCHC RBC AUTO-ENTMCNC: 36 G/DL (ref 33–37)
MCHC RBC AUTO-ENTMCNC: 36.8 G/DL (ref 33–37)
MCV RBC AUTO: 88.5 FL (ref 80–94)
MCV RBC AUTO: 90.8 FL (ref 80–94)
MCV RBC AUTO: 91.2 FL (ref 80–94)
MCV RBC AUTO: 91.7 FL (ref 80–94)
MCV RBC AUTO: 92.1 FL (ref 80–94)
MCV RBC AUTO: 92.5 FL (ref 80–94)
MCV RBC AUTO: 93.4 FL (ref 80–94)
MONOCYTES ABSOLUTE: 0.3 K/UL (ref 0–0.9)
MONOCYTES ABSOLUTE: 0.4 K/UL (ref 0–0.9)
MONOCYTES ABSOLUTE: 0.4 K/UL (ref 0–0.9)
MONOCYTES RELATIVE PERCENT: 5.9 % (ref 0–10)
MONOCYTES RELATIVE PERCENT: 6.1 % (ref 0–10)
MONOCYTES RELATIVE PERCENT: 6.4 % (ref 0–10)
MONOCYTES RELATIVE PERCENT: 7.7 % (ref 0–10)
MONOCYTES RELATIVE PERCENT: 7.9 % (ref 0–10)
MONOCYTES RELATIVE PERCENT: 8.1 % (ref 0–10)
MONOCYTES RELATIVE PERCENT: 9 % (ref 0–10)
MYCOPLASMA PNEUMONIAE BY PCR: NOT DETECTED
NEISSERIA MENINGITIDIS BY PCR: NOT DETECTED
NEUTROPHILS ABSOLUTE: 2.1 K/UL (ref 1.5–7.5)
NEUTROPHILS ABSOLUTE: 2.4 K/UL (ref 1.5–7.5)
NEUTROPHILS ABSOLUTE: 2.6 K/UL (ref 1.5–7.5)
NEUTROPHILS ABSOLUTE: 2.8 K/UL (ref 1.5–7.5)
NEUTROPHILS ABSOLUTE: 3.9 K/UL (ref 1.5–7.5)
NEUTROPHILS ABSOLUTE: 5 K/UL (ref 1.5–7.5)
NEUTROPHILS ABSOLUTE: 5.3 K/UL (ref 1.5–7.5)
NEUTROPHILS RELATIVE PERCENT: 62.2 % (ref 50–65)
NEUTROPHILS RELATIVE PERCENT: 64.4 % (ref 50–65)
NEUTROPHILS RELATIVE PERCENT: 66.5 % (ref 50–65)
NEUTROPHILS RELATIVE PERCENT: 68.5 % (ref 50–65)
NEUTROPHILS RELATIVE PERCENT: 77 % (ref 50–65)
NEUTROPHILS RELATIVE PERCENT: 79.3 % (ref 50–65)
NEUTROPHILS RELATIVE PERCENT: 80.7 % (ref 50–65)
NITRITE, URINE: NEGATIVE
ORGANISM: ABNORMAL
PARAINFLUENZA VIRUS 1 BY PCR: NOT DETECTED
PARAINFLUENZA VIRUS 2 BY PCR: NOT DETECTED
PARAINFLUENZA VIRUS 3 BY PCR: NOT DETECTED
PARAINFLUENZA VIRUS 4 BY PCR: NOT DETECTED
PDW BLD-RTO: 11.9 % (ref 11.5–14.5)
PDW BLD-RTO: 12.1 % (ref 11.5–14.5)
PDW BLD-RTO: 12.3 % (ref 11.5–14.5)
PDW BLD-RTO: 12.3 % (ref 11.5–14.5)
PDW BLD-RTO: 12.5 % (ref 11.5–14.5)
PDW BLD-RTO: 12.5 % (ref 11.5–14.5)
PDW BLD-RTO: 12.7 % (ref 11.5–14.5)
PERFORMED ON: ABNORMAL
PH UA: 5.5 (ref 5–8)
PHOSPHORUS: 3.7 MG/DL (ref 2.5–4.5)
PLATELET # BLD: 129 K/UL (ref 130–400)
PLATELET # BLD: 130 K/UL (ref 130–400)
PLATELET # BLD: 135 K/UL (ref 130–400)
PLATELET # BLD: 137 K/UL (ref 130–400)
PLATELET # BLD: 142 K/UL (ref 130–400)
PLATELET # BLD: 160 K/UL (ref 130–400)
PLATELET # BLD: 190 K/UL (ref 130–400)
PMV BLD AUTO: 8.9 FL (ref 9.4–12.4)
PMV BLD AUTO: 9.1 FL (ref 9.4–12.4)
PMV BLD AUTO: 9.4 FL (ref 9.4–12.4)
PMV BLD AUTO: 9.5 FL (ref 9.4–12.4)
PMV BLD AUTO: 9.5 FL (ref 9.4–12.4)
PMV BLD AUTO: 9.8 FL (ref 9.4–12.4)
PMV BLD AUTO: 9.9 FL (ref 9.4–12.4)
POTASSIUM REFLEX MAGNESIUM: 3.1 MMOL/L (ref 3.5–5)
POTASSIUM REFLEX MAGNESIUM: 3.1 MMOL/L (ref 3.5–5)
POTASSIUM REFLEX MAGNESIUM: 3.6 MMOL/L (ref 3.5–5)
POTASSIUM REFLEX MAGNESIUM: 3.7 MMOL/L (ref 3.5–5)
POTASSIUM REFLEX MAGNESIUM: 3.9 MMOL/L (ref 3.5–5)
POTASSIUM REFLEX MAGNESIUM: 3.9 MMOL/L (ref 3.5–5)
POTASSIUM REFLEX MAGNESIUM: 4 MMOL/L (ref 3.5–5)
POTASSIUM SERPL-SCNC: 3.2 MMOL/L (ref 3.5–5)
POTASSIUM SERPL-SCNC: 4.8 MMOL/L (ref 3.5–5)
PRO-BNP: 1134 PG/ML (ref 0–900)
PRO-BNP: 853 PG/ML (ref 0–900)
PROTEIN UA: NEGATIVE MG/DL
PROTEUS SPECIES BY PCR: NOT DETECTED
PSEUDOMONAS AERUGINOSA BY PCR: NOT DETECTED
RBC # BLD: 2.62 M/UL (ref 4.7–6.1)
RBC # BLD: 2.62 M/UL (ref 4.7–6.1)
RBC # BLD: 2.67 M/UL (ref 4.7–6.1)
RBC # BLD: 2.77 M/UL (ref 4.7–6.1)
RBC # BLD: 2.86 M/UL (ref 4.7–6.1)
RBC # BLD: 3.2 M/UL (ref 4.7–6.1)
RBC # BLD: 3.72 M/UL (ref 4.7–6.1)
RBC UA: 1 /HPF (ref 0–4)
RESPIRATORY SYNCYTIAL VIRUS BY PCR: NOT DETECTED
SALMONELLA SPECIES BY PCR: NOT DETECTED
SARS-COV-2, NAAT: NOT DETECTED
SARS-COV-2, PCR: NOT DETECTED
SERRATIA MARCESCENS BY PCR: NOT DETECTED
SODIUM BLD-SCNC: 133 MMOL/L (ref 136–145)
SODIUM BLD-SCNC: 135 MMOL/L (ref 136–145)
SODIUM BLD-SCNC: 135 MMOL/L (ref 136–145)
SODIUM BLD-SCNC: 136 MMOL/L (ref 136–145)
SODIUM BLD-SCNC: 136 MMOL/L (ref 136–145)
SODIUM BLD-SCNC: 137 MMOL/L (ref 136–145)
SPECIFIC GRAVITY UA: 1.01 (ref 1–1.03)
STAPHYLOCOCCUS AUREUS BY PCR: NOT DETECTED
STAPHYLOCOCCUS EPIDERMIDIS BY PCR: NOT DETECTED
STAPHYLOCOCCUS LUGDUNENSIS BY PCR: NOT DETECTED
STAPHYLOCOCCUS SPECIES BY PCR: NOT DETECTED
STENOTROPHOMONAS MALTOPHILIA BY PCR: NOT DETECTED
STREPTOCOCCUS AGALACTIAE BY PCR: NOT DETECTED
STREPTOCOCCUS PNEUMONIAE BY PCR: NOT DETECTED
STREPTOCOCCUS PYOGENES  BY PCR: NOT DETECTED
STREPTOCOCCUS SPECIES BY PCR: NOT DETECTED
T3 FREE: 1.8 PG/ML (ref 2–4.4)
T4 FREE: 0.51 NG/DL (ref 0.93–1.7)
T4 FREE: 0.96 NG/DL (ref 0.93–1.7)
TOTAL PROTEIN: 6.7 G/DL (ref 6.6–8.7)
TRIGL SERPL-MCNC: 101 MG/DL (ref 0–149)
TROPONIN: 0.03 NG/ML (ref 0–0.03)
TROPONIN: 0.04 NG/ML (ref 0–0.03)
TSH REFLEX FT4: 100 UIU/ML (ref 0.35–5.5)
TSH SERPL DL<=0.05 MIU/L-ACNC: 36.39 UIU/ML (ref 0.27–4.2)
URINE CULTURE, ROUTINE: ABNORMAL
URINE CULTURE, ROUTINE: ABNORMAL
UROBILINOGEN, URINE: 0.2 E.U./DL
VITAMIN D 25-HYDROXY: 19.2 NG/ML
WBC # BLD: 3.4 K/UL (ref 4.8–10.8)
WBC # BLD: 3.7 K/UL (ref 4.8–10.8)
WBC # BLD: 3.8 K/UL (ref 4.8–10.8)
WBC # BLD: 4.2 K/UL (ref 4.8–10.8)
WBC # BLD: 5 K/UL (ref 4.8–10.8)
WBC # BLD: 6.4 K/UL (ref 4.8–10.8)
WBC # BLD: 6.6 K/UL (ref 4.8–10.8)
WBC UA: 4 /HPF (ref 0–5)

## 2022-01-01 PROCEDURE — 70551 MRI BRAIN STEM W/O DYE: CPT | Performed by: RADIOLOGY

## 2022-01-01 PROCEDURE — 87040 BLOOD CULTURE FOR BACTERIA: CPT

## 2022-01-01 PROCEDURE — 87086 URINE CULTURE/COLONY COUNT: CPT

## 2022-01-01 PROCEDURE — 6360000002 HC RX W HCPCS: Performed by: FAMILY MEDICINE

## 2022-01-01 PROCEDURE — 2580000003 HC RX 258: Performed by: FAMILY MEDICINE

## 2022-01-01 PROCEDURE — 82947 ASSAY GLUCOSE BLOOD QUANT: CPT

## 2022-01-01 PROCEDURE — 4004F PT TOBACCO SCREEN RCVD TLK: CPT | Performed by: INTERNAL MEDICINE

## 2022-01-01 PROCEDURE — 97166 OT EVAL MOD COMPLEX 45 MIN: CPT

## 2022-01-01 PROCEDURE — 83880 ASSAY OF NATRIURETIC PEPTIDE: CPT

## 2022-01-01 PROCEDURE — 74176 CT ABD & PELVIS W/O CONTRAST: CPT

## 2022-01-01 PROCEDURE — 99213 OFFICE O/P EST LOW 20 MIN: CPT | Performed by: INTERNAL MEDICINE

## 2022-01-01 PROCEDURE — 0202U NFCT DS 22 TRGT SARS-COV-2: CPT

## 2022-01-01 PROCEDURE — 6360000002 HC RX W HCPCS: Performed by: NURSE PRACTITIONER

## 2022-01-01 PROCEDURE — 93282 PRGRMG EVAL IMPLANTABLE DFB: CPT | Performed by: INTERNAL MEDICINE

## 2022-01-01 PROCEDURE — 83735 ASSAY OF MAGNESIUM: CPT

## 2022-01-01 PROCEDURE — G8427 DOCREV CUR MEDS BY ELIG CLIN: HCPCS | Performed by: PHYSICIAN ASSISTANT

## 2022-01-01 PROCEDURE — 1036F TOBACCO NON-USER: CPT | Performed by: INTERNAL MEDICINE

## 2022-01-01 PROCEDURE — 99285 EMERGENCY DEPT VISIT HI MDM: CPT

## 2022-01-01 PROCEDURE — 4004F PT TOBACCO SCREEN RCVD TLK: CPT | Performed by: PHYSICIAN ASSISTANT

## 2022-01-01 PROCEDURE — 36415 COLL VENOUS BLD VENIPUNCTURE: CPT

## 2022-01-01 PROCEDURE — G8484 FLU IMMUNIZE NO ADMIN: HCPCS | Performed by: INTERNAL MEDICINE

## 2022-01-01 PROCEDURE — 1111F DSCHRG MED/CURRENT MED MERGE: CPT | Performed by: INTERNAL MEDICINE

## 2022-01-01 PROCEDURE — 2500000003 HC RX 250 WO HCPCS: Performed by: NURSE PRACTITIONER

## 2022-01-01 PROCEDURE — 6370000000 HC RX 637 (ALT 250 FOR IP): Performed by: NURSE PRACTITIONER

## 2022-01-01 PROCEDURE — 99214 OFFICE O/P EST MOD 30 MIN: CPT | Performed by: PHYSICIAN ASSISTANT

## 2022-01-01 PROCEDURE — G8420 CALC BMI NORM PARAMETERS: HCPCS | Performed by: PHYSICIAN ASSISTANT

## 2022-01-01 PROCEDURE — 99024 POSTOP FOLLOW-UP VISIT: CPT | Performed by: INTERNAL MEDICINE

## 2022-01-01 PROCEDURE — 6370000000 HC RX 637 (ALT 250 FOR IP): Performed by: FAMILY MEDICINE

## 2022-01-01 PROCEDURE — 85025 COMPLETE CBC W/AUTO DIFF WBC: CPT

## 2022-01-01 PROCEDURE — 80048 BASIC METABOLIC PNL TOTAL CA: CPT

## 2022-01-01 PROCEDURE — 1210000000 HC MED SURG R&B

## 2022-01-01 PROCEDURE — 4040F PNEUMOC VAC/ADMIN/RCVD: CPT | Performed by: INTERNAL MEDICINE

## 2022-01-01 PROCEDURE — 3017F COLORECTAL CA SCREEN DOC REV: CPT | Performed by: PHYSICIAN ASSISTANT

## 2022-01-01 PROCEDURE — 1123F ACP DISCUSS/DSCN MKR DOCD: CPT | Performed by: PHYSICIAN ASSISTANT

## 2022-01-01 PROCEDURE — 6360000004 HC RX CONTRAST MEDICATION: Performed by: FAMILY MEDICINE

## 2022-01-01 PROCEDURE — 84443 ASSAY THYROID STIM HORMONE: CPT

## 2022-01-01 PROCEDURE — 93295 DEV INTERROG REMOTE 1/2/MLT: CPT | Performed by: CLINICAL NURSE SPECIALIST

## 2022-01-01 PROCEDURE — G8427 DOCREV CUR MEDS BY ELIG CLIN: HCPCS | Performed by: INTERNAL MEDICINE

## 2022-01-01 PROCEDURE — 3017F COLORECTAL CA SCREEN DOC REV: CPT | Performed by: INTERNAL MEDICINE

## 2022-01-01 PROCEDURE — 1036F TOBACCO NON-USER: CPT | Performed by: PHYSICIAN ASSISTANT

## 2022-01-01 PROCEDURE — G8420 CALC BMI NORM PARAMETERS: HCPCS | Performed by: INTERNAL MEDICINE

## 2022-01-01 PROCEDURE — 6370000000 HC RX 637 (ALT 250 FOR IP): Performed by: INTERNAL MEDICINE

## 2022-01-01 PROCEDURE — 93923 UPR/LXTR ART STDY 3+ LVLS: CPT

## 2022-01-01 PROCEDURE — 87077 CULTURE AEROBIC IDENTIFY: CPT

## 2022-01-01 PROCEDURE — 1123F ACP DISCUSS/DSCN MKR DOCD: CPT | Performed by: INTERNAL MEDICINE

## 2022-01-01 PROCEDURE — 99213 OFFICE O/P EST LOW 20 MIN: CPT | Performed by: PHYSICIAN ASSISTANT

## 2022-01-01 PROCEDURE — 83036 HEMOGLOBIN GLYCOSYLATED A1C: CPT

## 2022-01-01 PROCEDURE — 87635 SARS-COV-2 COVID-19 AMP PRB: CPT

## 2022-01-01 PROCEDURE — 97535 SELF CARE MNGMENT TRAINING: CPT

## 2022-01-01 PROCEDURE — 82306 VITAMIN D 25 HYDROXY: CPT

## 2022-01-01 PROCEDURE — 81001 URINALYSIS AUTO W/SCOPE: CPT

## 2022-01-01 PROCEDURE — 97162 PT EVAL MOD COMPLEX 30 MIN: CPT

## 2022-01-01 PROCEDURE — 93005 ELECTROCARDIOGRAM TRACING: CPT | Performed by: PHYSICIAN ASSISTANT

## 2022-01-01 PROCEDURE — 70551 MRI BRAIN STEM W/O DYE: CPT

## 2022-01-01 PROCEDURE — 87186 SC STD MICRODIL/AGAR DIL: CPT

## 2022-01-01 PROCEDURE — 97116 GAIT TRAINING THERAPY: CPT

## 2022-01-01 PROCEDURE — 2580000003 HC RX 258: Performed by: NURSE PRACTITIONER

## 2022-01-01 PROCEDURE — 93296 REM INTERROG EVL PM/IDS: CPT | Performed by: CLINICAL NURSE SPECIALIST

## 2022-01-01 PROCEDURE — 84100 ASSAY OF PHOSPHORUS: CPT

## 2022-01-01 PROCEDURE — 80053 COMPREHEN METABOLIC PANEL: CPT

## 2022-01-01 PROCEDURE — 84132 ASSAY OF SERUM POTASSIUM: CPT

## 2022-01-01 PROCEDURE — 96374 THER/PROPH/DIAG INJ IV PUSH: CPT

## 2022-01-01 PROCEDURE — 93010 ELECTROCARDIOGRAM REPORT: CPT | Performed by: INTERNAL MEDICINE

## 2022-01-01 PROCEDURE — 84484 ASSAY OF TROPONIN QUANT: CPT

## 2022-01-01 PROCEDURE — 2580000003 HC RX 258: Performed by: PHYSICIAN ASSISTANT

## 2022-01-01 PROCEDURE — 84439 ASSAY OF FREE THYROXINE: CPT

## 2022-01-01 PROCEDURE — 83690 ASSAY OF LIPASE: CPT

## 2022-01-01 PROCEDURE — 97530 THERAPEUTIC ACTIVITIES: CPT

## 2022-01-01 PROCEDURE — 6360000002 HC RX W HCPCS: Performed by: PHYSICIAN ASSISTANT

## 2022-01-01 PROCEDURE — C8929 TTE W OR WO FOL WCON,DOPPLER: HCPCS

## 2022-01-01 PROCEDURE — 87150 DNA/RNA AMPLIFIED PROBE: CPT

## 2022-01-01 RX ORDER — GABAPENTIN 100 MG/1
100 CAPSULE ORAL 3 TIMES DAILY PRN
COMMUNITY
Start: 2022-01-01

## 2022-01-01 RX ORDER — MEMANTINE HYDROCHLORIDE 10 MG/1
TABLET ORAL
COMMUNITY

## 2022-01-01 RX ORDER — OCTISALATE, AVOBENZONE, HOMOSALATE, AND OCTOCRYLENE 29.4; 29.4; 49; 25.48 MG/ML; MG/ML; MG/ML; MG/ML
4 LOTION TOPICAL DAILY
Qty: 30 CAPSULE | Refills: 0 | Status: SHIPPED | OUTPATIENT
Start: 2022-01-01 | End: 2022-01-01

## 2022-01-01 RX ORDER — CLOPIDOGREL BISULFATE 75 MG/1
75 TABLET ORAL DAILY
Status: DISCONTINUED | OUTPATIENT
Start: 2022-01-01 | End: 2022-01-01 | Stop reason: HOSPADM

## 2022-01-01 RX ORDER — DEXTROSE MONOHYDRATE 25 G/50ML
12.5 INJECTION, SOLUTION INTRAVENOUS PRN
Status: DISCONTINUED | OUTPATIENT
Start: 2022-01-01 | End: 2022-01-01 | Stop reason: CLARIF

## 2022-01-01 RX ORDER — CARVEDILOL 3.12 MG/1
3.12 TABLET ORAL 2 TIMES DAILY
Qty: 60 TABLET | Refills: 5 | Status: SHIPPED | OUTPATIENT
Start: 2022-01-01

## 2022-01-01 RX ORDER — HYDROCODONE BITARTRATE AND ACETAMINOPHEN 7.5; 325 MG/1; MG/1
1 TABLET ORAL 2 TIMES DAILY PRN
Qty: 6 TABLET | Refills: 0 | Status: SHIPPED | OUTPATIENT
Start: 2022-01-01 | End: 2022-01-01

## 2022-01-01 RX ORDER — SODIUM CHLORIDE 9 MG/ML
INJECTION, SOLUTION INTRAVENOUS PRN
Status: DISCONTINUED | OUTPATIENT
Start: 2022-01-01 | End: 2022-01-01 | Stop reason: HOSPADM

## 2022-01-01 RX ORDER — POTASSIUM CHLORIDE 20 MEQ/1
40 TABLET, EXTENDED RELEASE ORAL ONCE
Status: COMPLETED | OUTPATIENT
Start: 2022-01-01 | End: 2022-01-01

## 2022-01-01 RX ORDER — AMIODARONE HYDROCHLORIDE 200 MG/1
200 TABLET ORAL DAILY
Status: DISCONTINUED | OUTPATIENT
Start: 2022-01-01 | End: 2022-01-01 | Stop reason: HOSPADM

## 2022-01-01 RX ORDER — 0.9 % SODIUM CHLORIDE 0.9 %
500 INTRAVENOUS SOLUTION INTRAVENOUS ONCE
Status: COMPLETED | OUTPATIENT
Start: 2022-01-01 | End: 2022-01-01

## 2022-01-01 RX ORDER — POTASSIUM CHLORIDE 20 MEQ/1
20 TABLET, EXTENDED RELEASE ORAL ONCE
Status: COMPLETED | OUTPATIENT
Start: 2022-01-01 | End: 2022-01-01

## 2022-01-01 RX ORDER — SODIUM CHLORIDE 0.9 % (FLUSH) 0.9 %
5-40 SYRINGE (ML) INJECTION EVERY 12 HOURS SCHEDULED
Status: DISCONTINUED | OUTPATIENT
Start: 2022-01-01 | End: 2022-01-01 | Stop reason: HOSPADM

## 2022-01-01 RX ORDER — ONDANSETRON 2 MG/ML
4 INJECTION INTRAMUSCULAR; INTRAVENOUS EVERY 6 HOURS PRN
Status: DISCONTINUED | OUTPATIENT
Start: 2022-01-01 | End: 2022-01-01 | Stop reason: HOSPADM

## 2022-01-01 RX ORDER — CLOPIDOGREL BISULFATE 75 MG/1
75 TABLET ORAL DAILY
Qty: 30 TABLET | Refills: 5 | Status: SHIPPED | OUTPATIENT
Start: 2022-01-01

## 2022-01-01 RX ORDER — LEVOTHYROXINE SODIUM 0.05 MG/1
50 TABLET ORAL DAILY
Qty: 30 TABLET | Refills: 0 | Status: SHIPPED | OUTPATIENT
Start: 2022-01-01 | End: 2022-01-01 | Stop reason: DRUGHIGH

## 2022-01-01 RX ORDER — METRONIDAZOLE 500 MG/1
500 TABLET ORAL EVERY 8 HOURS SCHEDULED
Status: DISCONTINUED | OUTPATIENT
Start: 2022-01-01 | End: 2022-01-01 | Stop reason: HOSPADM

## 2022-01-01 RX ORDER — FUROSEMIDE 40 MG/1
40 TABLET ORAL 2 TIMES DAILY
Qty: 60 TABLET | Refills: 3 | Status: SHIPPED | OUTPATIENT
Start: 2022-01-01

## 2022-01-01 RX ORDER — CIPROFLOXACIN 500 MG/1
500 TABLET, FILM COATED ORAL EVERY 12 HOURS SCHEDULED
Qty: 9 TABLET | Refills: 0 | Status: SHIPPED | OUTPATIENT
Start: 2022-01-01 | End: 2022-01-01

## 2022-01-01 RX ORDER — SODIUM CHLORIDE 0.9 % (FLUSH) 0.9 %
5-40 SYRINGE (ML) INJECTION PRN
Status: DISCONTINUED | OUTPATIENT
Start: 2022-01-01 | End: 2022-01-01 | Stop reason: HOSPADM

## 2022-01-01 RX ORDER — APIXABAN 5 MG/1
TABLET, FILM COATED ORAL
Qty: 60 TABLET | Refills: 5 | Status: SHIPPED | OUTPATIENT
Start: 2022-01-01

## 2022-01-01 RX ORDER — AMIODARONE HYDROCHLORIDE 200 MG/1
200 TABLET ORAL 2 TIMES DAILY
Qty: 180 TABLET | Refills: 2 | Status: SHIPPED | OUTPATIENT
Start: 2022-01-01

## 2022-01-01 RX ORDER — ACETAMINOPHEN 325 MG/1
650 TABLET ORAL EVERY 6 HOURS PRN
Status: DISCONTINUED | OUTPATIENT
Start: 2022-01-01 | End: 2022-01-01 | Stop reason: HOSPADM

## 2022-01-01 RX ORDER — GABAPENTIN 100 MG/1
100 CAPSULE ORAL 3 TIMES DAILY
Status: DISCONTINUED | OUTPATIENT
Start: 2022-01-01 | End: 2022-01-01 | Stop reason: HOSPADM

## 2022-01-01 RX ORDER — POTASSIUM CHLORIDE 7.45 MG/ML
10 INJECTION INTRAVENOUS PRN
Status: DISCONTINUED | OUTPATIENT
Start: 2022-01-01 | End: 2022-01-01

## 2022-01-01 RX ORDER — METRONIDAZOLE 500 MG/1
500 TABLET ORAL EVERY 8 HOURS SCHEDULED
Qty: 15 TABLET | Refills: 0 | Status: SHIPPED | OUTPATIENT
Start: 2022-01-01 | End: 2022-01-01

## 2022-01-01 RX ORDER — SODIUM CHLORIDE AND POTASSIUM CHLORIDE .9; .15 G/100ML; G/100ML
SOLUTION INTRAVENOUS CONTINUOUS
Status: DISCONTINUED | OUTPATIENT
Start: 2022-01-01 | End: 2022-01-01 | Stop reason: HOSPADM

## 2022-01-01 RX ORDER — METRONIDAZOLE 500 MG/100ML
500 INJECTION, SOLUTION INTRAVENOUS EVERY 8 HOURS
Status: DISCONTINUED | OUTPATIENT
Start: 2022-01-01 | End: 2022-01-01

## 2022-01-01 RX ORDER — SODIUM CHLORIDE 9 MG/ML
INJECTION, SOLUTION INTRAVENOUS CONTINUOUS
Status: DISCONTINUED | OUTPATIENT
Start: 2022-01-01 | End: 2022-01-01 | Stop reason: DRUGHIGH

## 2022-01-01 RX ORDER — ENOXAPARIN SODIUM 100 MG/ML
40 INJECTION SUBCUTANEOUS DAILY
Status: DISCONTINUED | OUTPATIENT
Start: 2022-01-01 | End: 2022-01-01

## 2022-01-01 RX ORDER — CIPROFLOXACIN 2 MG/ML
400 INJECTION, SOLUTION INTRAVENOUS EVERY 12 HOURS
Status: DISCONTINUED | OUTPATIENT
Start: 2022-01-01 | End: 2022-01-01

## 2022-01-01 RX ORDER — POTASSIUM CHLORIDE 20 MEQ/1
20 TABLET, EXTENDED RELEASE ORAL 2 TIMES DAILY
Qty: 60 TABLET | Refills: 5 | Status: SHIPPED | OUTPATIENT
Start: 2022-01-01

## 2022-01-01 RX ORDER — FERROUS SULFATE 325(65) MG
325 TABLET ORAL DAILY
COMMUNITY

## 2022-01-01 RX ORDER — POLYETHYLENE GLYCOL 3350 17 G/17G
17 POWDER, FOR SOLUTION ORAL DAILY PRN
Status: DISCONTINUED | OUTPATIENT
Start: 2022-01-01 | End: 2022-01-01 | Stop reason: HOSPADM

## 2022-01-01 RX ORDER — LEVOTHYROXINE SODIUM 200 UG/1
100 CAPSULE ORAL DAILY
COMMUNITY
Start: 2022-01-01

## 2022-01-01 RX ORDER — CARVEDILOL 3.12 MG/1
3.12 TABLET ORAL 2 TIMES DAILY
Status: DISCONTINUED | OUTPATIENT
Start: 2022-01-01 | End: 2022-01-01 | Stop reason: HOSPADM

## 2022-01-01 RX ORDER — CARVEDILOL 6.25 MG/1
6.25 TABLET ORAL 2 TIMES DAILY WITH MEALS
Qty: 60 TABLET | Refills: 5 | Status: ON HOLD | OUTPATIENT
Start: 2022-01-01 | End: 2022-01-01

## 2022-01-01 RX ORDER — ACETAMINOPHEN 650 MG/1
650 SUPPOSITORY RECTAL EVERY 6 HOURS PRN
Status: DISCONTINUED | OUTPATIENT
Start: 2022-01-01 | End: 2022-01-01 | Stop reason: HOSPADM

## 2022-01-01 RX ORDER — POTASSIUM CHLORIDE 20 MEQ/1
40 TABLET, EXTENDED RELEASE ORAL PRN
Status: DISCONTINUED | OUTPATIENT
Start: 2022-01-01 | End: 2022-01-01

## 2022-01-01 RX ORDER — NICOTINE POLACRILEX 4 MG
15 LOZENGE BUCCAL PRN
Status: DISCONTINUED | OUTPATIENT
Start: 2022-01-01 | End: 2022-01-01 | Stop reason: CLARIF

## 2022-01-01 RX ORDER — DEXTROSE MONOHYDRATE 50 MG/ML
100 INJECTION, SOLUTION INTRAVENOUS PRN
Status: DISCONTINUED | OUTPATIENT
Start: 2022-01-01 | End: 2022-01-01 | Stop reason: HOSPADM

## 2022-01-01 RX ORDER — LEVOTHYROXINE SODIUM 0.05 MG/1
50 TABLET ORAL DAILY
Status: DISCONTINUED | OUTPATIENT
Start: 2022-01-01 | End: 2022-01-01 | Stop reason: HOSPADM

## 2022-01-01 RX ORDER — INSULIN LISPRO 100 [IU]/ML
0-6 INJECTION, SOLUTION INTRAVENOUS; SUBCUTANEOUS
Status: DISCONTINUED | OUTPATIENT
Start: 2022-01-01 | End: 2022-01-01 | Stop reason: HOSPADM

## 2022-01-01 RX ORDER — DIPHENHYDRAMINE HCL 25 MG
25 TABLET ORAL EVERY 6 HOURS PRN
Status: DISCONTINUED | OUTPATIENT
Start: 2022-01-01 | End: 2022-01-01 | Stop reason: HOSPADM

## 2022-01-01 RX ORDER — CIPROFLOXACIN 500 MG/1
500 TABLET, FILM COATED ORAL EVERY 12 HOURS SCHEDULED
Status: DISCONTINUED | OUTPATIENT
Start: 2022-01-01 | End: 2022-01-01 | Stop reason: HOSPADM

## 2022-01-01 RX ORDER — ONDANSETRON 4 MG/1
4 TABLET, ORALLY DISINTEGRATING ORAL EVERY 8 HOURS PRN
Status: DISCONTINUED | OUTPATIENT
Start: 2022-01-01 | End: 2022-01-01 | Stop reason: HOSPADM

## 2022-01-01 RX ORDER — AMIODARONE HYDROCHLORIDE 200 MG/1
200 TABLET ORAL DAILY
Qty: 30 TABLET | Refills: 5 | Status: ON HOLD | OUTPATIENT
Start: 2022-01-01 | End: 2022-01-01

## 2022-01-01 RX ORDER — ASPIRIN 81 MG/1
81 TABLET ORAL DAILY
Status: DISCONTINUED | OUTPATIENT
Start: 2022-01-01 | End: 2022-01-01 | Stop reason: HOSPADM

## 2022-01-01 RX ORDER — POTASSIUM CHLORIDE 20 MEQ/1
20 TABLET, EXTENDED RELEASE ORAL 2 TIMES DAILY
Qty: 60 TABLET | Refills: 5 | Status: SHIPPED | OUTPATIENT
Start: 2022-01-01 | End: 2022-01-01 | Stop reason: SDUPTHER

## 2022-01-01 RX ORDER — HYDROCODONE BITARTRATE AND ACETAMINOPHEN 7.5; 325 MG/1; MG/1
7.5 TABLET ORAL PRN
COMMUNITY
Start: 2022-01-01

## 2022-01-01 RX ORDER — INSULIN LISPRO 100 [IU]/ML
0-3 INJECTION, SOLUTION INTRAVENOUS; SUBCUTANEOUS NIGHTLY
Status: DISCONTINUED | OUTPATIENT
Start: 2022-01-01 | End: 2022-01-01 | Stop reason: HOSPADM

## 2022-01-01 RX ORDER — ONDANSETRON 2 MG/ML
4 INJECTION INTRAMUSCULAR; INTRAVENOUS ONCE
Status: COMPLETED | OUTPATIENT
Start: 2022-01-01 | End: 2022-01-01

## 2022-01-01 RX ORDER — SODIUM CHLORIDE 9 MG/ML
INJECTION, SOLUTION INTRAVENOUS CONTINUOUS
Status: DISCONTINUED | OUTPATIENT
Start: 2022-01-01 | End: 2022-01-01

## 2022-01-01 RX ADMIN — CARVEDILOL 3.12 MG: 3.12 TABLET, FILM COATED ORAL at 20:48

## 2022-01-01 RX ADMIN — GABAPENTIN 100 MG: 100 CAPSULE ORAL at 08:54

## 2022-01-01 RX ADMIN — GABAPENTIN 100 MG: 100 CAPSULE ORAL at 07:49

## 2022-01-01 RX ADMIN — SODIUM CHLORIDE: 9 INJECTION, SOLUTION INTRAVENOUS at 20:03

## 2022-01-01 RX ADMIN — METRONIDAZOLE 500 MG: 500 INJECTION, SOLUTION INTRAVENOUS at 15:05

## 2022-01-01 RX ADMIN — APIXABAN 5 MG: 5 TABLET, FILM COATED ORAL at 07:49

## 2022-01-01 RX ADMIN — INSULIN LISPRO 2 UNITS: 100 INJECTION, SOLUTION INTRAVENOUS; SUBCUTANEOUS at 17:36

## 2022-01-01 RX ADMIN — POTASSIUM CHLORIDE 40 MEQ: 20 TABLET, EXTENDED RELEASE ORAL at 09:06

## 2022-01-01 RX ADMIN — METRONIDAZOLE 500 MG: 500 TABLET ORAL at 06:30

## 2022-01-01 RX ADMIN — METRONIDAZOLE 500 MG: 500 INJECTION, SOLUTION INTRAVENOUS at 21:23

## 2022-01-01 RX ADMIN — POTASSIUM CHLORIDE AND SODIUM CHLORIDE: 900; 150 INJECTION, SOLUTION INTRAVENOUS at 11:28

## 2022-01-01 RX ADMIN — DIPHENHYDRAMINE HYDROCHLORIDE 25 MG: 25 TABLET ORAL at 02:19

## 2022-01-01 RX ADMIN — GABAPENTIN 100 MG: 100 CAPSULE ORAL at 22:10

## 2022-01-01 RX ADMIN — SODIUM CHLORIDE 500 ML: 9 INJECTION, SOLUTION INTRAVENOUS at 09:41

## 2022-01-01 RX ADMIN — ONDANSETRON 4 MG: 2 INJECTION INTRAMUSCULAR; INTRAVENOUS at 15:29

## 2022-01-01 RX ADMIN — PERFLUTREN 1.5 ML: 6.52 INJECTION, SUSPENSION INTRAVENOUS at 14:18

## 2022-01-01 RX ADMIN — SODIUM CHLORIDE 500 ML: 9 INJECTION, SOLUTION INTRAVENOUS at 15:32

## 2022-01-01 RX ADMIN — CIPROFLOXACIN 400 MG: 2 INJECTION INTRAVENOUS at 19:55

## 2022-01-01 RX ADMIN — ONDANSETRON 4 MG: 2 INJECTION INTRAMUSCULAR; INTRAVENOUS at 21:22

## 2022-01-01 RX ADMIN — ASPIRIN 81 MG: 81 TABLET, COATED ORAL at 09:15

## 2022-01-01 RX ADMIN — GABAPENTIN 100 MG: 100 CAPSULE ORAL at 15:06

## 2022-01-01 RX ADMIN — GABAPENTIN 100 MG: 100 CAPSULE ORAL at 08:38

## 2022-01-01 RX ADMIN — APIXABAN 5 MG: 5 TABLET, FILM COATED ORAL at 20:48

## 2022-01-01 RX ADMIN — METRONIDAZOLE 500 MG: 500 INJECTION, SOLUTION INTRAVENOUS at 04:15

## 2022-01-01 RX ADMIN — METRONIDAZOLE 500 MG: 500 INJECTION, SOLUTION INTRAVENOUS at 11:52

## 2022-01-01 RX ADMIN — APIXABAN 5 MG: 5 TABLET, FILM COATED ORAL at 09:15

## 2022-01-01 RX ADMIN — CARVEDILOL 3.12 MG: 3.12 TABLET, FILM COATED ORAL at 07:23

## 2022-01-01 RX ADMIN — APIXABAN 5 MG: 5 TABLET, FILM COATED ORAL at 20:26

## 2022-01-01 RX ADMIN — ASPIRIN 81 MG: 81 TABLET, COATED ORAL at 08:13

## 2022-01-01 RX ADMIN — AMIODARONE HYDROCHLORIDE 200 MG: 200 TABLET ORAL at 07:49

## 2022-01-01 RX ADMIN — ACETAMINOPHEN 650 MG: 325 TABLET ORAL at 20:34

## 2022-01-01 RX ADMIN — GABAPENTIN 100 MG: 100 CAPSULE ORAL at 20:43

## 2022-01-01 RX ADMIN — GABAPENTIN 100 MG: 100 CAPSULE ORAL at 07:23

## 2022-01-01 RX ADMIN — LEVOTHYROXINE SODIUM 50 MCG: 50 TABLET ORAL at 05:24

## 2022-01-01 RX ADMIN — SODIUM CHLORIDE, PRESERVATIVE FREE 10 ML: 5 INJECTION INTRAVENOUS at 21:23

## 2022-01-01 RX ADMIN — APIXABAN 5 MG: 5 TABLET, FILM COATED ORAL at 08:54

## 2022-01-01 RX ADMIN — SODIUM CHLORIDE: 9 INJECTION, SOLUTION INTRAVENOUS at 11:50

## 2022-01-01 RX ADMIN — GABAPENTIN 100 MG: 100 CAPSULE ORAL at 20:35

## 2022-01-01 RX ADMIN — GABAPENTIN 100 MG: 100 CAPSULE ORAL at 14:44

## 2022-01-01 RX ADMIN — APIXABAN 5 MG: 5 TABLET, FILM COATED ORAL at 08:13

## 2022-01-01 RX ADMIN — METRONIDAZOLE 500 MG: 500 INJECTION, SOLUTION INTRAVENOUS at 04:30

## 2022-01-01 RX ADMIN — ONDANSETRON 4 MG: 4 TABLET, ORALLY DISINTEGRATING ORAL at 09:18

## 2022-01-01 RX ADMIN — METRONIDAZOLE 500 MG: 500 TABLET ORAL at 04:40

## 2022-01-01 RX ADMIN — INSULIN LISPRO 2 UNITS: 100 INJECTION, SOLUTION INTRAVENOUS; SUBCUTANEOUS at 20:44

## 2022-01-01 RX ADMIN — CIPROFLOXACIN 500 MG: 500 TABLET, FILM COATED ORAL at 08:38

## 2022-01-01 RX ADMIN — CLOPIDOGREL BISULFATE 75 MG: 75 TABLET ORAL at 09:15

## 2022-01-01 RX ADMIN — ASPIRIN 81 MG: 81 TABLET, COATED ORAL at 08:38

## 2022-01-01 RX ADMIN — INSULIN LISPRO 1 UNITS: 100 INJECTION, SOLUTION INTRAVENOUS; SUBCUTANEOUS at 20:57

## 2022-01-01 RX ADMIN — CARVEDILOL 3.12 MG: 3.12 TABLET, FILM COATED ORAL at 07:49

## 2022-01-01 RX ADMIN — POTASSIUM CHLORIDE AND SODIUM CHLORIDE: 900; 150 INJECTION, SOLUTION INTRAVENOUS at 20:34

## 2022-01-01 RX ADMIN — GABAPENTIN 100 MG: 100 CAPSULE ORAL at 22:59

## 2022-01-01 RX ADMIN — ASPIRIN 81 MG: 81 TABLET, COATED ORAL at 07:23

## 2022-01-01 RX ADMIN — DIPHENHYDRAMINE HYDROCHLORIDE 25 MG: 25 TABLET ORAL at 20:59

## 2022-01-01 RX ADMIN — CLOPIDOGREL BISULFATE 75 MG: 75 TABLET ORAL at 08:13

## 2022-01-01 RX ADMIN — CIPROFLOXACIN 400 MG: 2 INJECTION INTRAVENOUS at 10:22

## 2022-01-01 RX ADMIN — LEVOTHYROXINE SODIUM 50 MCG: 50 TABLET ORAL at 05:47

## 2022-01-01 RX ADMIN — AMIODARONE HYDROCHLORIDE 200 MG: 200 TABLET ORAL at 09:15

## 2022-01-01 RX ADMIN — CLOPIDOGREL BISULFATE 75 MG: 75 TABLET ORAL at 08:38

## 2022-01-01 RX ADMIN — CARVEDILOL 3.12 MG: 3.12 TABLET, FILM COATED ORAL at 08:38

## 2022-01-01 RX ADMIN — CIPROFLOXACIN 400 MG: 2 INJECTION INTRAVENOUS at 00:36

## 2022-01-01 RX ADMIN — SODIUM CHLORIDE: 9 INJECTION, SOLUTION INTRAVENOUS at 13:13

## 2022-01-01 RX ADMIN — APIXABAN 5 MG: 5 TABLET, FILM COATED ORAL at 20:43

## 2022-01-01 RX ADMIN — DIPHENHYDRAMINE HYDROCHLORIDE 25 MG: 25 TABLET ORAL at 20:34

## 2022-01-01 RX ADMIN — CIPROFLOXACIN 500 MG: 500 TABLET, FILM COATED ORAL at 08:54

## 2022-01-01 RX ADMIN — POTASSIUM CHLORIDE 20 MEQ: 1500 TABLET, EXTENDED RELEASE ORAL at 17:36

## 2022-01-01 RX ADMIN — GABAPENTIN 100 MG: 100 CAPSULE ORAL at 09:15

## 2022-01-01 RX ADMIN — CLOPIDOGREL BISULFATE 75 MG: 75 TABLET ORAL at 08:54

## 2022-01-01 RX ADMIN — METRONIDAZOLE 500 MG: 500 TABLET ORAL at 20:34

## 2022-01-01 RX ADMIN — GABAPENTIN 100 MG: 100 CAPSULE ORAL at 08:13

## 2022-01-01 RX ADMIN — CIPROFLOXACIN 400 MG: 2 INJECTION INTRAVENOUS at 22:22

## 2022-01-01 RX ADMIN — CARVEDILOL 3.12 MG: 3.12 TABLET, FILM COATED ORAL at 22:11

## 2022-01-01 RX ADMIN — CIPROFLOXACIN 500 MG: 500 TABLET, FILM COATED ORAL at 20:48

## 2022-01-01 RX ADMIN — CARVEDILOL 3.12 MG: 3.12 TABLET, FILM COATED ORAL at 21:49

## 2022-01-01 RX ADMIN — METRONIDAZOLE 500 MG: 500 TABLET ORAL at 20:48

## 2022-01-01 RX ADMIN — CLOPIDOGREL BISULFATE 75 MG: 75 TABLET ORAL at 07:23

## 2022-01-01 RX ADMIN — GABAPENTIN 100 MG: 100 CAPSULE ORAL at 20:26

## 2022-01-01 RX ADMIN — SODIUM CHLORIDE, PRESERVATIVE FREE 10 ML: 5 INJECTION INTRAVENOUS at 08:39

## 2022-01-01 RX ADMIN — APIXABAN 5 MG: 5 TABLET, FILM COATED ORAL at 08:38

## 2022-01-01 RX ADMIN — GABAPENTIN 100 MG: 100 CAPSULE ORAL at 20:48

## 2022-01-01 RX ADMIN — METRONIDAZOLE 500 MG: 500 INJECTION, SOLUTION INTRAVENOUS at 20:45

## 2022-01-01 RX ADMIN — METRONIDAZOLE 500 MG: 500 INJECTION, SOLUTION INTRAVENOUS at 18:21

## 2022-01-01 RX ADMIN — INSULIN LISPRO 3 UNITS: 100 INJECTION, SOLUTION INTRAVENOUS; SUBCUTANEOUS at 12:17

## 2022-01-01 RX ADMIN — LEVOTHYROXINE SODIUM 50 MCG: 50 TABLET ORAL at 05:03

## 2022-01-01 RX ADMIN — METRONIDAZOLE 500 MG: 500 INJECTION, SOLUTION INTRAVENOUS at 05:37

## 2022-01-01 RX ADMIN — AMIODARONE HYDROCHLORIDE 200 MG: 200 TABLET ORAL at 08:13

## 2022-01-01 RX ADMIN — INSULIN LISPRO 1 UNITS: 100 INJECTION, SOLUTION INTRAVENOUS; SUBCUTANEOUS at 09:07

## 2022-01-01 RX ADMIN — POTASSIUM CHLORIDE AND SODIUM CHLORIDE: 900; 150 INJECTION, SOLUTION INTRAVENOUS at 17:16

## 2022-01-01 RX ADMIN — CARVEDILOL 3.12 MG: 3.12 TABLET, FILM COATED ORAL at 20:34

## 2022-01-01 RX ADMIN — ASPIRIN 81 MG: 81 TABLET, COATED ORAL at 20:00

## 2022-01-01 RX ADMIN — ASPIRIN 81 MG: 81 TABLET, COATED ORAL at 07:48

## 2022-01-01 RX ADMIN — POTASSIUM BICARBONATE 40 MEQ: 782 TABLET, EFFERVESCENT ORAL at 01:38

## 2022-01-01 RX ADMIN — LEVOTHYROXINE SODIUM 50 MCG: 50 TABLET ORAL at 06:29

## 2022-01-01 RX ADMIN — INSULIN LISPRO 1 UNITS: 100 INJECTION, SOLUTION INTRAVENOUS; SUBCUTANEOUS at 22:11

## 2022-01-01 RX ADMIN — ASPIRIN 81 MG: 81 TABLET, COATED ORAL at 08:54

## 2022-01-01 RX ADMIN — CIPROFLOXACIN 400 MG: 2 INJECTION INTRAVENOUS at 06:12

## 2022-01-01 RX ADMIN — APIXABAN 5 MG: 5 TABLET, FILM COATED ORAL at 22:11

## 2022-01-01 RX ADMIN — AMIODARONE HYDROCHLORIDE 200 MG: 200 TABLET ORAL at 08:38

## 2022-01-01 RX ADMIN — CLOPIDOGREL BISULFATE 75 MG: 75 TABLET ORAL at 07:49

## 2022-01-01 RX ADMIN — CLOPIDOGREL BISULFATE 75 MG: 75 TABLET ORAL at 21:49

## 2022-01-01 RX ADMIN — APIXABAN 5 MG: 5 TABLET, FILM COATED ORAL at 07:23

## 2022-01-01 RX ADMIN — CIPROFLOXACIN 500 MG: 500 TABLET, FILM COATED ORAL at 20:36

## 2022-01-01 RX ADMIN — CARVEDILOL 3.12 MG: 3.12 TABLET, FILM COATED ORAL at 20:26

## 2022-01-01 RX ADMIN — INSULIN LISPRO 1 UNITS: 100 INJECTION, SOLUTION INTRAVENOUS; SUBCUTANEOUS at 13:13

## 2022-01-01 RX ADMIN — METRONIDAZOLE 500 MG: 500 INJECTION, SOLUTION INTRAVENOUS at 22:10

## 2022-01-01 RX ADMIN — METRONIDAZOLE 500 MG: 500 TABLET ORAL at 13:13

## 2022-01-01 RX ADMIN — INSULIN LISPRO 1 UNITS: 100 INJECTION, SOLUTION INTRAVENOUS; SUBCUTANEOUS at 08:38

## 2022-01-01 RX ADMIN — METRONIDAZOLE 500 MG: 500 TABLET ORAL at 14:44

## 2022-01-01 RX ADMIN — APIXABAN 5 MG: 5 TABLET, FILM COATED ORAL at 20:00

## 2022-01-01 RX ADMIN — CIPROFLOXACIN 500 MG: 500 TABLET, FILM COATED ORAL at 09:06

## 2022-01-01 RX ADMIN — AMIODARONE HYDROCHLORIDE 200 MG: 200 TABLET ORAL at 08:54

## 2022-01-01 RX ADMIN — INSULIN LISPRO 1 UNITS: 100 INJECTION, SOLUTION INTRAVENOUS; SUBCUTANEOUS at 21:29

## 2022-01-01 RX ADMIN — CARVEDILOL 3.12 MG: 3.12 TABLET, FILM COATED ORAL at 08:54

## 2022-01-01 RX ADMIN — CARVEDILOL 3.12 MG: 3.12 TABLET, FILM COATED ORAL at 08:13

## 2022-01-01 RX ADMIN — GABAPENTIN 100 MG: 100 CAPSULE ORAL at 13:13

## 2022-01-01 RX ADMIN — METRONIDAZOLE 500 MG: 500 INJECTION, SOLUTION INTRAVENOUS at 05:22

## 2022-01-01 RX ADMIN — AMIODARONE HYDROCHLORIDE 200 MG: 200 TABLET ORAL at 20:00

## 2022-01-01 RX ADMIN — INSULIN LISPRO 2 UNITS: 100 INJECTION, SOLUTION INTRAVENOUS; SUBCUTANEOUS at 17:14

## 2022-01-01 RX ADMIN — APIXABAN 5 MG: 5 TABLET, FILM COATED ORAL at 20:35

## 2022-01-01 RX ADMIN — METRONIDAZOLE 500 MG: 500 TABLET ORAL at 12:17

## 2022-01-01 RX ADMIN — CIPROFLOXACIN 400 MG: 2 INJECTION INTRAVENOUS at 21:24

## 2022-01-01 RX ADMIN — CIPROFLOXACIN 400 MG: 2 INJECTION INTRAVENOUS at 13:15

## 2022-01-01 RX ADMIN — LEVOTHYROXINE SODIUM 50 MCG: 50 TABLET ORAL at 05:34

## 2022-01-01 RX ADMIN — GABAPENTIN 100 MG: 100 CAPSULE ORAL at 12:17

## 2022-01-01 RX ADMIN — LEVOTHYROXINE SODIUM 50 MCG: 50 TABLET ORAL at 11:52

## 2022-01-01 RX ADMIN — AMIODARONE HYDROCHLORIDE 200 MG: 200 TABLET ORAL at 07:23

## 2022-01-01 ASSESSMENT — ENCOUNTER SYMPTOMS
GASTROINTESTINAL NEGATIVE: 1
SHORTNESS OF BREATH: 0
EYES NEGATIVE: 1
VOMITING: 1
SHORTNESS OF BREATH: 0
VOMITING: 0
RESPIRATORY NEGATIVE: 1
EYES NEGATIVE: 1
DIARRHEA: 0
DIARRHEA: 0
EYES NEGATIVE: 1
GASTROINTESTINAL NEGATIVE: 1
DIARRHEA: 1
RESPIRATORY NEGATIVE: 1
VOMITING: 0
DIARRHEA: 0
NAUSEA: 1
GASTROINTESTINAL NEGATIVE: 1
COUGH: 0
EYES NEGATIVE: 1
VOMITING: 0
SHORTNESS OF BREATH: 0
VOMITING: 0
SHORTNESS OF BREATH: 0
NAUSEA: 0
ABDOMINAL PAIN: 1
NAUSEA: 0
ABDOMINAL DISTENTION: 0
RESPIRATORY NEGATIVE: 1
NAUSEA: 0
DIARRHEA: 0
RESPIRATORY NEGATIVE: 1
GASTROINTESTINAL NEGATIVE: 1
NAUSEA: 0
SHORTNESS OF BREATH: 0

## 2022-01-01 ASSESSMENT — PAIN SCALES - GENERAL: PAINLEVEL_OUTOF10: 6

## 2022-01-01 ASSESSMENT — PAIN DESCRIPTION - LOCATION: LOCATION: ABDOMEN

## 2022-01-01 ASSESSMENT — PAIN DESCRIPTION - ORIENTATION: ORIENTATION: LOWER

## 2022-01-01 ASSESSMENT — PAIN DESCRIPTION - DESCRIPTORS: DESCRIPTORS: ACHING

## 2022-01-10 NOTE — PROGRESS NOTES
[facility] Progress Note                            Date:  1/10/2022  Patient: Deborah Verma  Age:  66 y. o., 1950      Reason for evaluation:         SUBJECTIVE:    Wound check today recent ICD implant wound healing appropriately. Denies any device therapy denies chest pain or dyspnea. No other complaints or issues reported. On examination wound healing appropriately suture line intact no hematoma or erythema etc.    Review of Systems   Constitutional: Negative. Negative for chills, fever and unexpected weight change. HENT: Negative. Eyes: Negative. Respiratory: Negative. Negative for shortness of breath. Cardiovascular: Negative. Negative for chest pain. Gastrointestinal: Negative. Negative for diarrhea, nausea and vomiting. Endocrine: Negative. Genitourinary: Negative. Musculoskeletal: Negative. Skin: Negative. Neurological: Negative. All other systems reviewed and are negative. OBJECTIVE:     There were no vitals taken for this visit. Labs:   CBC: No results for input(s): WBC, HGB, HCT, PLT in the last 72 hours. BMP:No results for input(s): NA, K, CO2, BUN, CREATININE, LABGLOM, GLUCOSE in the last 72 hours. BNP: No results for input(s): BNP in the last 72 hours. PT/INR: No results for input(s): PROTIME, INR in the last 72 hours. APTT:No results for input(s): APTT in the last 72 hours. CARDIAC ENZYMES:No results for input(s): CKTOTAL, CKMB, CKMBINDEX, TROPONINI in the last 72 hours. FASTING LIPID PANEL:  Lab Results   Component Value Date    HDL 51 02/18/2019    LDLCALC 66 02/18/2019    TRIG 81 02/18/2019     LIVER PROFILE:No results for input(s): AST, ALT, LABALBU in the last 72 hours. Past Medical History:   Diagnosis Date    Arthritis     Asthma     Cancer ([facility] 08/2018    Prostrate.      Coronary artery disease 12/17/2018    Diabetes mellitus ([facility]     Hypercholesterolemia 12/17/2018    Hypertension     Hypothyroidism 7/6/2018    Pancreatitis     Rheumatic fever     as child    Systolic congestive heart failure ([facility] 12/17/2018     Past Surgical History:   Procedure Laterality Date    APPENDECTOMY      CHOLECYSTECTOMY      PANCREAS SURGERY      STENT PLACED IN BILE DUCT     Family History   Problem Relation Age of Onset    Cancer Mother     Heart Disease Mother     Diabetes Mother     Tuberculosis Father      Allergies   Allergen Reactions    Phenergan [Promethazine Hcl]      Current Outpatient Medications   Medication Sig Dispense Refill    apixaban (ELIQUIS) 5 MG TABS tablet Take 1 tablet by mouth 2 times daily Resume in 3 days 30 tablet 0    JANUVIA 25 MG tablet       amiodarone (CORDARONE) 200 MG tablet Take 200 mg by mouth 2 times daily      HYDROcodone-acetaminophen (NORCO) 7.5-325 MG per tablet Take 1 tablet by mouth 2 times daily as needed. Hasn't been taking      hydrocortisone 2.5 % cream Apply 1 Pump topically 3 times daily as needed      NARCAN 4 MG/0.1ML LIQD nasal spray       furosemide (LASIX) 40 MG tablet Take 1 tablet by mouth 2 times daily 60 tablet 3    potassium chloride (KLOR-CON M) 20 MEQ extended release tablet Take 1 tablet by mouth 2 times daily 60 tablet 5    carvedilol (COREG) 3.125 MG tablet Take 1 tablet by mouth 2 times daily 60 tablet 5    Multiple Vitamins-Minerals (COMPLETE SENIOR PO) Take by mouth      doxyLAMINE succinate (SLEEP AID) 25 MG tablet Take 25 mg by mouth nightly      gabapentin (NEURONTIN) 100 MG capsule Take 1 capsule by mouth 3 times daily for 30 days. 90 capsule 0    clopidogrel (PLAVIX) 75 MG tablet Take 1 tablet by mouth daily 30 tablet 5    aspirin EC 81 MG EC tablet Take 1 tablet by mouth daily 30 tablet 5    lisinopril (PRINIVIL;ZESTRIL) 10 MG tablet lisinopril 10 mg tablet  DAILY       No current facility-administered medications for this visit.      Social History     Socioeconomic History    Marital status:      Spouse name: Not on file    Number of children: Not on file    Years of education: Not on file    Highest education level: Not on file   Occupational History    Not on file   Tobacco Use    Smoking status: Never Smoker    Smokeless tobacco: Current User     Types: Chew   Vaping Use    Vaping Use: Never used   Substance and Sexual Activity    Alcohol use: Not Currently     Comment: occ    Drug use: No    Sexual activity: Not on file   Other Topics Concern    Not on file   Social History Narrative    Not on file     Social Determinants of Health     Financial Resource Strain:     Difficulty of Paying Living Expenses: Not on file   Food Insecurity:     Worried About Running Out of Food in the Last Year: Not on file    Yanira of Food in the Last Year: Not on file   Transportation Needs:     Lack of Transportation (Medical): Not on file    Lack of Transportation (Non-Medical): Not on file   Physical Activity:     Days of Exercise per Week: Not on file    Minutes of Exercise per Session: Not on file   Stress:     Feeling of Stress : Not on file   Social Connections:     Frequency of Communication with Friends and Family: Not on file    Frequency of Social Gatherings with Friends and Family: Not on file    Attends Restoration Services: Not on file    Active Member of [address] [place] [address] [id number] or Organizations: Not on file    Attends Club or Organization Meetings: Not on file    Marital Status: Not on file   Intimate Partner Violence:     Fear of Current or Ex-Partner: Not on file    Emotionally Abused: Not on file    Physically Abused: Not on file    Sexually Abused: Not on file   Housing Stability:     Unable to Pay for Housing in the Last Year: Not on file    Number of [place] in the Last Year: Not on file    Unstable Housing in the Last Year: Not on file       Physical Examination:  There were no vitals taken for this visit. Physical Exam  Vitals reviewed. Constitutional:       Appearance: He is well-developed.    Neck: No Vascular: No carotid bruit or JVD. Cardiovascular:      Rate and Rhythm: Normal rate and regular rhythm. Heart sounds: Normal heart sounds. No murmur heard. No friction rub. No gallop. Pulmonary:      Effort: Pulmonary effort is normal. No respiratory distress. Breath sounds: Normal breath sounds. No wheezing or rales. Abdominal:      General: There is no distension. Tenderness: There is no abdominal tenderness. Lymphadenopathy:      Cervical: No cervical adenopathy. Skin:     General: Skin is warm and dry. ASSESSMENT:     Diagnosis Orders   1. CHF (congestive heart failure), NYHA class I, acute on chronic, combined ([facility] [address]     2. Ischemic cardiomyopathy     3. Chronic anticoagulation     4. Coronary artery disease involving native coronary artery of native heart without angina pectoris     5. Paroxysmal atrial fibrillation (HCC)     6. Essential hypertension     7. ESPINOSA (dyspnea on exertion)         PLAN:  No orders of the defined types were placed in this encounter. No orders of the defined types were placed in this encounter. 1. Continue present medications  2. Recheck with me in 30 days. Return in about 4 weeks (around 2/7/2022) for return to Dr. Sancho Walls only. Rajesh Gallego MD 1/10/2022 2:15 PM [facility]      Thisdictation was generated by voice recognition computer software. Although all attempts are made to edit the dictation for accuracy, there may be errors in the transcription that are not intended.

## 2022-02-17 NOTE — PROGRESS NOTES
[facility] Progress Note                            Date:  2/17/2022  Patient: Nicholas Nino  Age:  67 y. o., 1950      Reason for evaluation:         SUBJECTIVE:    Returns today follow-up assessment overall doing well. Dyspnea stable denies chest pain fell once a few weeks ago generally does not fall very much. He is predominantly in a wheelchair cannot walk very effectively due to peripheral neuropathy. No other complaints or issues reported. Denies palpitations. On a apixaban denies any bleeding issues. Blood pressure 110/62 heart 65. Cath 12/4/2018 reviewed 100% occlusion mid LAD fills by collaterals from the right coronary artery. Lexiscan 11/20/2018 EF 30% extensive anterior anteroseptal and anteroapical scar no ischemia. Review of Systems   Constitutional: Negative. Negative for chills, fever and unexpected weight change. HENT: Negative. Eyes: Negative. Respiratory: Negative. Negative for shortness of breath. Cardiovascular: Negative. Negative for chest pain. Gastrointestinal: Negative. Negative for diarrhea, nausea and vomiting. Endocrine: Negative. Genitourinary: Negative. Musculoskeletal: Negative. Skin: Negative. Neurological: Negative. All other systems reviewed and are negative. OBJECTIVE:     /62   Pulse 65   Ht 5' 10\" (1.778 m)   Wt 145 lb (65.8 kg)   SpO2 99%   BMI 20.81 kg/m²     Labs:   CBC: No results for input(s): WBC, HGB, HCT, PLT in the last 72 hours. BMP:No results for input(s): NA, K, CO2, BUN, CREATININE, LABGLOM, GLUCOSE in the last 72 hours. BNP: No results for input(s): BNP in the last 72 hours. PT/INR: No results for input(s): PROTIME, INR in the last 72 hours. APTT:No results for input(s): APTT in the last 72 hours. CARDIAC ENZYMES:No results for input(s): CKTOTAL, CKMB, CKMBINDEX, TROPONINI in the last 72 hours.   FASTING LIPID PANEL:  Lab Results   Component Value Date    HDL 51 02/18/2019 LDLCALC 66 02/18/2019    TRIG 81 02/18/2019     LIVER PROFILE:No results for input(s): AST, ALT, LABALBU in the last 72 hours. Past Medical History:   Diagnosis Date    Arthritis     Asthma     Cancer ([facility] 08/2018    Prostrate.  Coronary artery disease 12/17/2018    Diabetes mellitus ([facility]     Hypercholesterolemia 12/17/2018    Hypertension     Hypothyroidism 7/6/2018    Pancreatitis     Rheumatic fever     as child    Systolic congestive heart failure ([facility] 12/17/2018     Past Surgical History:   Procedure Laterality Date    APPENDECTOMY      CHOLECYSTECTOMY      PANCREAS SURGERY      STENT PLACED IN BILE DUCT     Family History   Problem Relation Age of Onset    Cancer Mother     Heart Disease Mother     Diabetes Mother     Tuberculosis Father      Allergies   Allergen Reactions    Phenergan [Promethazine Hcl]      Current Outpatient Medications   Medication Sig Dispense Refill    apixaban (ELIQUIS) 5 MG TABS tablet Take 1 tablet by mouth 2 times daily Resume in 3 days 30 tablet 0    JANUVIA 25 MG tablet Take 25 mg by mouth daily       amiodarone (CORDARONE) 200 MG tablet Take 200 mg by mouth 2 times daily      HYDROcodone-acetaminophen (NORCO) 7.5-325 MG per tablet Take 1 tablet by mouth 2 times daily as needed.  Hasn't been taking      hydrocortisone 2.5 % cream Apply 1 Pump topically 3 times daily as needed      NARCAN 4 MG/0.1ML LIQD nasal spray       furosemide (LASIX) 40 MG tablet Take 1 tablet by mouth 2 times daily 60 tablet 3    potassium chloride (KLOR-CON M) 20 MEQ extended release tablet Take 1 tablet by mouth 2 times daily 60 tablet 5    carvedilol (COREG) 3.125 MG tablet Take 1 tablet by mouth 2 times daily 60 tablet 5    doxyLAMINE succinate (SLEEP AID) 25 MG tablet Take 25 mg by mouth nightly      clopidogrel (PLAVIX) 75 MG tablet Take 1 tablet by mouth daily 30 tablet 5    aspirin EC 81 MG EC tablet Take 1 tablet by mouth daily 30 tablet 5    lisinopril (PRINIVIL;ZESTRIL) 10 MG tablet lisinopril 10 mg tablet  DAILY      Multiple Vitamins-Minerals (COMPLETE SENIOR PO) Take by mouth (Patient not taking: Reported on 2/17/2022)      gabapentin (NEURONTIN) 100 MG capsule Take 1 capsule by mouth 3 times daily for 30 days. 90 capsule 0     No current facility-administered medications for this visit. Social History     Socioeconomic History    Marital status:      Spouse name: Not on file    Number of children: Not on file    Years of education: Not on file    Highest education level: Not on file   Occupational History    Not on file   Tobacco Use    Smoking status: Never Smoker    Smokeless tobacco: Current User     Types: Chew   Vaping Use    Vaping Use: Never used   Substance and Sexual Activity    Alcohol use: Not Currently     Comment: occ    Drug use: No    Sexual activity: Not on file   Other Topics Concern    Not on file   Social History Narrative    Not on file     Social Determinants of Health     Financial Resource Strain:     Difficulty of Paying Living Expenses: Not on file   Food Insecurity:     Worried About Running Out of Food in the Last Year: Not on file    Yanira of Food in the Last Year: Not on file   Transportation Needs:     Lack of Transportation (Medical): Not on file    Lack of Transportation (Non-Medical):  Not on file   Physical Activity:     Days of Exercise per Week: Not on file    Minutes of Exercise per Session: Not on file   Stress:     Feeling of Stress : Not on file   Social Connections:     Frequency of Communication with Friends and Family: Not on file    Frequency of Social Gatherings with Friends and Family: Not on file    Attends Jew Services: Not on file    Active Member of Clubs or Organizations: Not on file    Attends Club or Organization Meetings: Not on file    Marital Status: Not on file   Intimate Partner Violence:     Fear of Current or Ex-Partner: Not on file   [employer] Abused: Not on file    Physically Abused: Not on file    Sexually Abused: Not on file   Housing Stability:     Unable to Pay for Housing in the Last Year: Not on file    Number of Places Lived in the Last Year: Not on file    Unstable Housing in the Last Year: Not on file       Physical Examination:  /62   Pulse 65   Ht 5' 10\" (1.778 m)   Wt 145 lb (65.8 kg)   SpO2 99%   BMI 20.81 kg/m²   Physical Exam  Vitals reviewed. Constitutional:       Appearance: He is well-developed. Neck:      Vascular: No carotid bruit or JVD. Cardiovascular:      Rate and Rhythm: Normal rate and regular rhythm. Heart sounds: Normal heart sounds. No murmur heard. No friction rub. No gallop. Pulmonary:      Effort: Pulmonary effort is normal. No respiratory distress. Breath sounds: Normal breath sounds. No wheezing or rales. Abdominal:      General: There is no distension. Tenderness: There is no abdominal tenderness. Lymphadenopathy:      Cervical: No cervical adenopathy. Skin:     General: Skin is warm and dry. ASSESSMENT:     Diagnosis Orders   1. Ischemic cardiomyopathy     2. Chronic anticoagulation     3. ESPINOSA (dyspnea on exertion)     4. Coronary artery disease involving native coronary artery of native heart without angina pectoris     5. Paroxysmal atrial fibrillation (HCC)     6. Essential hypertension     7. Systolic congestive heart failure, unspecified HF chronicity ([address]     8. H/O amiodarone therapy         PLAN:  No orders of the defined types were placed in this encounter. No orders of the defined types were placed in this encounter. 1. Continue present medications  2. Decrease amiodarone 200 mg p.o. once daily  3. Increase Coreg 6.25 p.o. twice daily  4. Recommend follow-up assessment in 3 months  5. Laboratory studies as ordered    Return in about 3 months (around 5/17/2022) for return to Dr. Michael Manuel only.       Leandro Hatchet, MD 2/17/2022 1:52 PM [facility]      Thisdictation was generated by voice recognition computer software. Although all attempts are made to edit the dictation for accuracy, there may be errors in the transcription that are not intended.

## 2022-04-26 PROBLEM — R79.89 ELEVATED TROPONIN: Status: ACTIVE | Noted: 2022-01-01

## 2022-04-26 PROBLEM — R19.7 NAUSEA VOMITING AND DIARRHEA: Status: ACTIVE | Noted: 2021-01-01

## 2022-04-26 PROBLEM — N17.9 AKI (ACUTE KIDNEY INJURY) (HCC): Status: ACTIVE | Noted: 2022-01-01

## 2022-04-26 PROBLEM — K52.9 COLITIS: Status: ACTIVE | Noted: 2022-01-01

## 2022-04-26 PROBLEM — R93.5 ABNORMAL CT OF THE ABDOMEN: Status: ACTIVE | Noted: 2022-01-01

## 2022-04-26 PROBLEM — R77.8 ELEVATED TROPONIN: Status: ACTIVE | Noted: 2022-01-01

## 2022-04-26 NOTE — H&P
[facility]      Hospitalist - History & Physical      PCP: LO Dickson CNP    Date of Admission: 4/26/2022    Date of Service: 4/26/2022    Chief Complaint:  Fatigue     History Of Present Illness: The patient is a 67 y.o. male who presented to [address] [place] [address] ED complaining of generalized weakness and fatigue. Pt has history of CHF, paroxysmal afib, anticoagulation, permament pacemaker placement, prostate cancer, pancreatitis, HTN, hyperlipidemia, diabetes, hypothyroidism and CAD. He has had generalized weakness and malaise over past week with associated nausea, vomiting and diarrhea over past 3 days. He tells me that he fell 3 nights ago at home due to generalized weakness. He denies syncope as well as injury from fall. He denies concern for blood in emesis as well as stool. He has had decreased urination over past few days with possible blood tinged urine. He has had generalized abdominal pain. He has had decreased appetite for food. His daughter tells me that he has had decreased po intake of fluids as well. In ED, CT abd/pelvis-mild mucosal thickening and probable mild colitis, oval low attenuation mass in the tail of the pancreas with coarse rim calcification which have increased, may reflect changes of chronic pancreatitis or encapsulated pseudocyst, small pericardial effusion which has decreased in volume compared to previous CT    Sodium 135, potassium 3.9, CO2 25, anion gap 14, glucose 172, creatinine 1.8/BUN 30, LFTs normal, lipase 117, pro-bnp 853, resp pcr panel negative, troponin mildly elevated at 0.04, UA-negative blood, negative leukocyte esterase, negative nitrite, negative bacteria, wbc 6.6, hgb 12, platelets 226J. Pt is admitted to hospitalist service for further evaluation and treatment. Past Medical History:        Diagnosis Date    Arthritis     Asthma     Cancer ([facility] [address] 08/2018    Prostrate.      Coronary artery disease 12/17/2018    Diabetes mellitus ([facility] [address]  Hypercholesterolemia 12/17/2018    Hypertension     Hypothyroidism 7/6/2018    Pancreatitis     Rheumatic fever     as child    Systolic congestive heart failure ([facility] [address] 12/17/2018       Past Surgical History:        Procedure Laterality Date    APPENDECTOMY      CHOLECYSTECTOMY      PANCREAS SURGERY      STENT PLACED IN BILE DUCT       Home Medications:  Prior to Admission medications    Medication Sig Start Date End Date Taking? Authorizing Provider   furosemide (LASIX) 40 MG tablet Take 1 tablet by mouth 2 times daily 4/5/22   LO Meeks   carvedilol (COREG) 6.25 MG tablet Take 1 tablet by mouth 2 times daily (with meals) 2/17/22   Charlotte Johnson MD   amiodarone (CORDARONE) 200 MG tablet Take 1 tablet by mouth daily 2/17/22   Charlotte Johnson MD   apixaban (ELIQUIS) 5 MG TABS tablet Take 1 tablet by mouth 2 times daily Resume in 3 days 12/30/21   Charlotte Johnson MD   JANUVIA 25 MG tablet Take 25 mg by mouth daily  12/6/21   Historical Provider, MD   amiodarone (CORDARONE) 200 MG tablet Take 200 mg by mouth 2 times daily    Historical Provider, MD   HYDROcodone-acetaminophen (NORCO) 7.5-325 MG per tablet Take 1 tablet by mouth 2 times daily as needed.  Hasn't been taking 12/18/21   Historical Provider, MD   hydrocortisone 2.5 % cream Apply 1 Pump topically 3 times daily as needed 12/23/21   Historical Provider, MD   NARCAN 4 MG/0.1ML LIQD nasal spray  11/19/21   Historical Provider, MD   potassium chloride (KLOR-CON M) 20 MEQ extended release tablet Take 1 tablet by mouth 2 times daily 12/13/21   Charlotte Johnson MD   carvedilol (COREG) 3.125 MG tablet Take 1 tablet by mouth 2 times daily 12/2/21   Charlotte Johnson MD   Multiple Vitamins-Minerals (COMPLETE SENIOR PO) Take by mouth  Patient not taking: Reported on 2/17/2022    Historical Provider, MD   doxyLAMINE succinate (SLEEP AID) 25 MG tablet Take 25 mg by mouth nightly    Historical Provider, MD   gabapentin (NEURONTIN) 100 MG capsule Take 1 capsule by mouth 3 times daily for 30 days. 1/15/20 12/29/21  Sangmatt JagdeeLO   clopidogrel (PLAVIX) 75 MG tablet Take 1 tablet by mouth daily 6/27/19   Aydin Bailey MD   aspirin EC 81 MG EC tablet Take 1 tablet by mouth daily 6/27/19   Aydin Bailey MD   lisinopril (PRINIVIL;ZESTRIL) 10 MG tablet lisinopril 10 mg tablet  DAILY    Historical Provider, MD       Allergies:    Phenergan [promethazine hcl]    Social History:    The patient currently lives with wife  Tobacco:   reports that he has never smoked. His smokeless tobacco use includes chew. Alcohol:   reports previous alcohol use. Illicit Drugs: {none    Family History:      Problem Relation Age of Onset   Karlene Stabs Cancer Mother     Heart Disease Mother     Diabetes Mother     Tuberculosis Father        Review of Systems:   Review of Systems   Constitutional: Positive for activity change and fatigue. Negative for fever. Respiratory: Negative for shortness of breath. Cardiovascular: Negative for chest pain. Gastrointestinal: Positive for abdominal pain, diarrhea, nausea and vomiting. Neurological: Positive for weakness (global). All other systems reviewed and are negative. 14 point review of systems is negative except as specifically addressed above. Physical Examination:  /73   Pulse 60   Temp 98.2 °F (36.8 °C) (Oral)   Resp 16   Ht 5' 10\" (1.778 m)   Wt 140 lb (63.5 kg)   SpO2 97%   BMI 20.09 kg/m²   Physical Exam  Vitals reviewed. HENT:      Head: Normocephalic. Right Ear: External ear normal.      Left Ear: External ear normal.      Mouth/Throat:      Mouth: Mucous membranes are dry. Eyes:      Conjunctiva/sclera: Conjunctivae normal.   Cardiovascular:      Rate and Rhythm: Normal rate and regular rhythm. Heart sounds: Normal heart sounds. Pulmonary:      Effort: Pulmonary effort is normal.      Breath sounds: Normal breath sounds.    Abdominal: General: Bowel sounds are normal.      Palpations: Abdomen is soft. Tenderness: There is no abdominal tenderness. Musculoskeletal:         General: Normal range of motion. Cervical back: Normal range of motion. Comments: Moves extremities equally x4  Old appearing bruising right forearm  bandaid dressing right lateral proximal forearm  No direct ttp right hand/wrist/forearm/elbow  Right foot with peroneal palsy   Skin:     General: Skin is warm and dry. Neurological:      Mental Status: He is alert and oriented to person, place, and time. Diagnostic Data:  CBC:  Recent Labs     04/26/22  1410   WBC 6.6   HGB 12.0*   HCT 34.1*        BMP:  Recent Labs     04/26/22  1410   *   K 3.9   CL 96*   CO2 25   BUN 30*   CREATININE 1.8*   CALCIUM 9.2     Recent Labs     04/26/22  1410   AST 25   ALT 29   BILITOT 0.6   ALKPHOS 78     Coag Panel: No results for input(s): INR, PROTIME, APTT in the last 72 hours. Cardiac Enzymes:   Recent Labs     04/26/22  1410   TROPONINI 0.04*     Urinalysis:  Lab Results   Component Value Date    NITRU Negative 04/26/2022    WBCUA 4 04/26/2022    BACTERIA NEGATIVE 04/26/2022    RBCUA 1 04/26/2022    BLOODU Negative 04/26/2022    SPECGRAV 1.011 04/26/2022    GLUCOSEU Negative 04/26/2022       CT ABDOMEN PELVIS WO CONTRAST Additional Contrast? None    Addendum Date: 4/26/2022    Addendum: There is a transcription error in the impression. Under impression #1, the third sentence should read \"There is no evidence of free air or free fluid identified\". End addendum. Signed by Dr Contreras Plants    Result Date: 4/26/2022  ** ORIGINAL REPORT ** EXAMINATION: CT ABDOMEN PELVIS WO CONTRAST 4/26/2022 5:48 PM HISTORY: Acute abdominal pain, history of pancreatitis with stent placement in the common bile duct. DOSE: 377 mGycm. Automatic exposure control was utilized in an effort to use as little radiation as possible, without compromising image quality.  REPORT: Spiral CT of the abdomen and pelvis was performed without contrast from the lung bases through the pubic symphysis. Reconstructed coronal and sagittal images are also reviewed. COMPARISON: CT abdomen and pelvis with and without contrast 2/17/2020. Review of lung windows and straight mild bibasilar atelectasis. No pleural effusion is identified. There is artifact associated with pacemaker wires. There is a small pericardial effusion, decreased in volume compared with the previous CT. Heart size appears to be normal. Cholecystectomy clips are present. The liver is homogeneous without evidence of a mass. There are scattered calcified granulomas in the spleen. Spleen size is normal. The stomach is decompressed. There is a oval mass at the tail the pancreas, measuring approximately 3.0 x 1.5 cm. This demonstrates coarse rim calcification. A similar mass was seen in this location on the previous study this is essentially unchanged except for an increase in the volume of peripheral calcification around the mass. The internal attenuation is in the soft tissue range. No pancreatic ductal dilation is identified. The adrenal glands appear within normal limits. Small nonobstructing nephrolithiasis are present, the largest stone is at the inferior pole the left kidney measures approximately 3 mm. There is no hydronephrosis. The ureters are decompressed. No free fluid or free air is identified. Radiation seeds are noted in the prostate gland. The prostate gland is mildly enlarged. The bladder appears within normal limits. There is mild calcification thickening involving the cecum and ascending colon extending close to the hepatic flexure suspicious for mild colitis, though the pericolonic fat planes are normal. Underdistention artifact could have a similar appearance.  Review of bone windows shows no acute abnormality, there is advanced degenerative disc disease at L4-5.    1. Mild mucosal thickening and probable mild colitis involving the cecum and ascending colon, versus underdistention distention artifact. The pericolonic fat planes are preserved in this region. Evidence of free air or free fluid is identified. No intra-abdominal abscess. 2. Oval low-attenuation mass in the tail the pancreas with coarse rim calcifications which have increased, may reflect changes of chronic pancreatitis, with an encapsulated pseudocyst, this measures approximately 3.0 x 1.5 cm. Follow-up is recommended to exclude the less likely possibility of pancreatic neoplasm. 3. Previous cholecystectomy is noted. 4. Small nonobstructing bilateral nephrolithiasis are present. 5. Small pericardial effusion, which has decreased in volume compared with the previous CT. Signed by Dr Sandra Morin. Yesenia** ADDENDUM #1 **      Assessment/Plan:  Principal Problem:    NAKUL (acute kidney injury) ([address]  Active Problems:    Coronary artery disease    Hypercholesterolemia    Ischemic cardiomyopathy    Paroxysmal atrial fibrillation (HCC)    Colitis    Abnormal CT of the abdomen    Elevated troponin    Essential hypertension    Diabetes (HCC)    Hypothyroidism    Adenocarcinoma of prostate, stage 2 (HCC)    Nausea vomiting and diarrhea    Moderate malnutrition (HCC)  Resolved Problems:    * No resolved hospital problems.  *     Principal Problem:    NAKUL/Nausea vomiting and diarrhea/Colitis/Abnormal CT of the abdomen?chronic pancreatitis v/s mass   -NS at 75cc/hr   -cipro 500mg iv q12hrs   -flagyle 500mg iv q8hrs   -blood cultures x2   -magnesium   -phosphorus   -vit d 25hyrdoxy   -monitor renal fxn/lytes   -avoid nephrotoxic agents   -gi molecular panel   -I's and O's   -daily weight   -fall precautions   Elevated troponin/Coronary artery disease/ Paroxysmal atrial fibrillation    -monitor for chest pain   -trend troponin   -echocardiogram   -telemetry   -continue amiodarone   -continue coreg   -continue eliquis  Active Problems:    Essential hypertension   -monitor blood pressure   -continue home antihypertensive medications    Diabetes (HCC)   -HgA1c   -poc glucose qid   -low dose insulin coverage   -hypoglycemic orders    Hypothyroidism   -TSH w/reflex FT4  Resolved Problems:    * No resolved hospital problems.  *  Signed:  LO Melchor CNP, 4/26/2022 6:20 PM

## 2022-04-26 NOTE — ED NOTES
Del James (daughter) [phone], would like to be updated when patient has been moved to inpatient bed.      Fausto Carranza RN  04/26/22 1906

## 2022-04-26 NOTE — ED PROVIDER NOTES
[facility] - [facility] EMERGENCY DEPT  eMERGENCY dEPARTMENT eNCOUnter      Pt Name: David Gorman  MRN: [id number]  [place] 1950  Date of evaluation: 4/26/2022  Provider: Ellis Proctor Dr       Chief Complaint   Patient presents with    Fatigue     x several days, N/V/D started today         HISTORY OF PRESENT ILLNESS   (Location/Symptom, Timing/Onset,Context/Setting, Quality, Duration, Modifying Factors, Severity)  Note limiting factors. David Gorman is a 67 y.o. male with medical history including coronary artery disease, paroxysmal A. fib, hypercholesterolemia, hypertension, hypothyroidism, prostate cancer status post radiation, and type 2 diabetes who presents to the emergency department with complaint of fatigue and several days of GI upset including nausea, vomiting, and diarrhea. Today his weakness was significantly worse. The patient denies any associated blood in his stool or vomit. He denies any fever, chills, or URI symptoms. He is denying any chest pain or shortness of breath. He has no known sick contacts    HPI    NursingNotes were reviewed. REVIEW OF SYSTEMS    (2-9 systems for level 4, 10 or more for level 5)     Review of Systems   Constitutional: Positive for fatigue. Negative for chills and fever. HENT: Negative for congestion. Respiratory: Negative for cough and shortness of breath. Cardiovascular: Negative for chest pain. Gastrointestinal: Positive for abdominal pain, diarrhea, nausea and vomiting. Negative for abdominal distention. Genitourinary: Negative for dysuria, flank pain and hematuria. Musculoskeletal: Negative for myalgias. Neurological: Positive for weakness. Negative for dizziness and headaches. All other systems reviewed and are negative. PAST MEDICALHISTORY     Past Medical History:   Diagnosis Date    Arthritis     Asthma     Cancer ([facility] [address] 08/2018    Prostrate.      Coronary artery disease 12/17/2018    Diabetes mellitus ([facility] [address] [facility]     Hypercholesterolemia 12/17/2018    Hypertension     Hypothyroidism 7/6/2018    Pancreatitis     Rheumatic fever     as child    Systolic congestive heart failure ([facility] [address] 12/17/2018         SURGICAL HISTORY       Past Surgical History:   Procedure Laterality Date    APPENDECTOMY      CHOLECYSTECTOMY      PANCREAS SURGERY      STENT PLACED IN BILE DUCT         CURRENT MEDICATIONS     Previous Medications    AMIODARONE (CORDARONE) 200 MG TABLET    Take 200 mg by mouth 2 times daily    AMIODARONE (CORDARONE) 200 MG TABLET    Take 1 tablet by mouth daily    APIXABAN (ELIQUIS) 5 MG TABS TABLET    Take 1 tablet by mouth 2 times daily Resume in 3 days    ASPIRIN EC 81 MG EC TABLET    Take 1 tablet by mouth daily    CARVEDILOL (COREG) 3.125 MG TABLET    Take 1 tablet by mouth 2 times daily    CARVEDILOL (COREG) 6.25 MG TABLET    Take 1 tablet by mouth 2 times daily (with meals)    CLOPIDOGREL (PLAVIX) 75 MG TABLET    Take 1 tablet by mouth daily    DOXYLAMINE SUCCINATE (SLEEP AID) 25 MG TABLET    Take 25 mg by mouth nightly    FUROSEMIDE (LASIX) 40 MG TABLET    Take 1 tablet by mouth 2 times daily    GABAPENTIN (NEURONTIN) 100 MG CAPSULE    Take 1 capsule by mouth 3 times daily for 30 days. HYDROCODONE-ACETAMINOPHEN (NORCO) 7.5-325 MG PER TABLET    Take 1 tablet by mouth 2 times daily as needed.  Hasn't been taking    HYDROCORTISONE 2.5 % CREAM    Apply 1 Pump topically 3 times daily as needed    JANUVIA 25 MG TABLET    Take 25 mg by mouth daily     LISINOPRIL (PRINIVIL;ZESTRIL) 10 MG TABLET    lisinopril 10 mg tablet  DAILY    MULTIPLE VITAMINS-MINERALS (COMPLETE SENIOR PO)    Take by mouth    NARCAN 4 MG/0.1ML LIQD NASAL SPRAY        POTASSIUM CHLORIDE (KLOR-CON M) 20 MEQ EXTENDED RELEASE TABLET    Take 1 tablet by mouth 2 times daily       ALLERGIES     Phenergan [promethazine hcl]    FAMILY HISTORY       Family History   Problem Relation Age of Onset    Cancer Mother     Heart Disease Mother     Diabetes Mother     Tuberculosis Father           SOCIAL HISTORY       Social History     Socioeconomic History    Marital status:      Spouse name: None    Number of children: None    Years of education: None    Highest education level: None   Occupational History    None   Tobacco Use    Smoking status: Never Smoker    Smokeless tobacco: Current User     Types: Chew   Vaping Use    Vaping Use: Never used   Substance and Sexual Activity    Alcohol use: Not Currently     Comment: occ    Drug use: No    Sexual activity: None   Other Topics Concern    None   Social History Narrative    None     Social Determinants of Health     Financial Resource Strain:     Difficulty of Paying Living Expenses: Not on file   Food Insecurity:     Worried About Running Out of Food in the Last Year: Not on file    Yanira of Food in the Last Year: Not on file   Transportation Needs:     Lack of Transportation (Medical): Not on file    Lack of Transportation (Non-Medical):  Not on file   Physical Activity:     Days of Exercise per Week: Not on file    Minutes of Exercise per Session: Not on file   Stress:     Feeling of Stress : Not on file   Social Connections:     Frequency of Communication with Friends and Family: Not on file    Frequency of Social Gatherings with Friends and Family: Not on file    Attends Zoroastrian Services: Not on file    Active Member of [address] [place] [address] or Organizations: Not on file    Attends Club or Organization Meetings: Not on file    Marital Status: Not on file   Intimate Partner Violence:     Fear of Current or Ex-Partner: Not on file    Emotionally Abused: Not on file    Physically Abused: Not on file    Sexually Abused: Not on file   Housing Stability:     Unable to Pay for Housing in the Last Year: Not on file    Number of [place] in the Last Year: Not on file    Unstable Housing in the Last Year: Not on file       SCREENINGS    [place] Coma Scale  Eye Opening: Spontaneous  Best Verbal Response: Oriented  Best Motor Response: Obeys commands  Gordon Coma Scale Score: 15        PHYSICAL EXAM    (up to 7 for level 4, 8 or more for level 5)     ED Triage Vitals [04/26/22 1405]   BP Temp Temp Source Pulse Resp SpO2 Height Weight   138/86 98.2 °F (36.8 °C) Oral 63 18 100 % 5' 10\" (1.778 m) 140 lb (63.5 kg)       Physical Exam  Vitals and nursing note reviewed. Constitutional:       General: He is not in acute distress. Appearance: Normal appearance. He is ill-appearing. He is not toxic-appearing. HENT:      Head: Normocephalic and atraumatic. Eyes:      Extraocular Movements: Extraocular movements intact. Conjunctiva/sclera: Conjunctivae normal.      Pupils: Pupils are equal, round, and reactive to light. Cardiovascular:      Rate and Rhythm: Normal rate and regular rhythm. Pulses: Normal pulses. Pulmonary:      Effort: Pulmonary effort is normal. No respiratory distress. Chest:      Chest wall: No tenderness. Abdominal:      General: There is no distension. Palpations: Abdomen is soft. Tenderness: There is abdominal tenderness (diffuse). Musculoskeletal:         General: No swelling, tenderness, deformity or signs of injury. Cervical back: Normal range of motion and neck supple. Skin:     General: Skin is warm and dry. Neurological:      General: No focal deficit present. Mental Status: He is alert and oriented to person, place, and time. Cranial Nerves: No cranial nerve deficit. Motor: Weakness (diffuse) present.       Coordination: Coordination normal.         DIAGNOSTIC RESULTS     EKG: All EKG's areinterpreted by the Emergency Department Physician who either signs or Co-signs this chart in the absence of a cardiologist.    *EKG interpreted by attending, atrial paced rhythm at a rate of 60, prolonged QT interval of 507, , no STEMI or acute ischemia    RADIOLOGY:  Non-plain film images such as CT, Ultrasound and MRI are read by the radiologist. Plain radiographic images are visualized and preliminarily interpreted bythe emergency physician with the below findings:    CT ABDOMEN PELVIS WO CONTRAST Additional Contrast? None   Final Result   Addendum 1 of 1   Addendum: There is a transcription error in the impression. Under   impression #1, the third sentence should read \"There is no evidence of   free air or free fluid identified\". End addendum. Signed by Dr Marie Alvarado      Final   1. Mild mucosal thickening and probable mild colitis involving the   cecum and ascending colon, versus underdistention distention artifact. The pericolonic fat planes are preserved in this region. Evidence of   free air or free fluid is identified. No intra-abdominal abscess. 2. Oval low-attenuation mass in the tail the pancreas with coarse rim   calcifications which have increased, may reflect changes of chronic   pancreatitis, with an encapsulated pseudocyst, this measures   approximately 3.0 x 1.5 cm. Follow-up is recommended to exclude the   less likely possibility of pancreatic neoplasm. 3. Previous cholecystectomy is noted. 4. Small nonobstructing bilateral nephrolithiasis are present. 5. Small pericardial effusion, which has decreased in volume compared   with the previous CT. Signed by Dr Mago Avila.  [place]** ADDENDUM #1 **              LABS:  Labs Reviewed   CBC WITH AUTO DIFFERENTIAL - Abnormal; Notable for the following components:       Result Value    RBC 3.72 (*)     Hemoglobin 12.0 (*)     Hematocrit 34.1 (*)     MCH 32.3 (*)     MPV 9.1 (*)     Neutrophils % 80.7 (*)     Lymphocytes % 11.7 (*)     Lymphocytes Absolute 0.8 (*)     All other components within normal limits   COMPREHENSIVE METABOLIC PANEL W/ REFLEX TO MG FOR LOW K - Abnormal; Notable for the following components:    Sodium 135 (*)     Chloride 96 (*)     Glucose 172 (*)     BUN 30 (*)     CREATININE 1.8 (*)     GFR Non- 37 (*)     GFR  American 45 (*)     All other components within normal limits   TROPONIN - Abnormal; Notable for the following components:    Troponin 0.04 (*)     All other components within normal limits   LIPASE - Abnormal; Notable for the following components:    Lipase 117 (*)     All other components within normal limits   URINALYSIS WITH MICROSCOPIC - Abnormal; Notable for the following components:    Bacteria, UA NEGATIVE (*)     Crystals, UA NEG (*)     All other components within normal limits   RESPIRATORY PANEL, MOLECULAR, WITH COVID-19   CULTURE, URINE   GASTROINTESTINAL PANEL, MOLECULAR   BRAIN NATRIURETIC PEPTIDE   TROPONIN   BASIC METABOLIC PANEL W/ REFLEX TO MG FOR LOW K   CBC WITH AUTO DIFFERENTIAL   TROPONIN   TROPONIN       All other labs were within normal range or not returned as of this dictation. EMERGENCY DEPARTMENT COURSE and DIFFERENTIAL DIAGNOSIS/MDM:   Vitals:    Vitals:    04/26/22 1500 04/26/22 1530 04/26/22 1600 04/26/22 1630   BP: 114/75 (!) 120/103 108/72 118/73   Pulse: 60 60 60 60   Resp: 20 18 16 16   Temp:       TempSrc:       SpO2: 98% 97% 97% 97%   Weight:       Height:           MDM  Patient is a 19-year-old male presents the ER with complaint of fatigue and several days of GI upset. On physical exam, he did have some abdominal tenderness noted. He has no concerning leukocytosis on his CBC. His CMP reveals mild hyponatremia and mild hypochloremia. He also does have concerning elevation of his creatinine and BUN. His GFR has decreased. Due to these findings, he will be admitted for an NAKUL. He has no associated LFT elevation. His lipase is slightly elevated and there is note on a CT scan of a possible pancreatic neoplasm. He has no other acute surgical abdominal process noted on his CT imaging. He does have a slight troponin elevation so we will continue to trend. His EKG shows sinus rhythm without STEMI or acute ischemia.   Due to his heart failure, he was only given 1 L of fluids in the ER. He has no elevation of his BNP at this time. Hospitalist has accepted the patient. CONSULTS:  Dr Lucía Henderson, Accepting hospitalist    FINAL IMPRESSION      1. Acute kidney injury (NAKUL) with acute tubular necrosis (ATN) (HCC)    2. Dehydration    3.  Nausea vomiting and diarrhea          DISPOSITION/PLAN   DISPOSITION Admitted 04/26/2022 06:13:43 PM    (Please note that portions of this note were completed with a voice recognition program.  Efforts were made to edit thedictations but occasionally words are mis-transcribed.)    Hernandez Aponte (electronically signed)     Hernandez Aponte  04/26/22 Delmi Kapooremily

## 2022-04-27 NOTE — CONSULTS
Palliative Care:     Pt known to Palliative Care.     68 y/o man who presented with general weakness and fatigue. Multiple chronic conditions noted. Continued workup and pt was sent to ECHO while I was in the room with his daughter. Pt lives at home with his wife who is in bad health and has home health assisting her. Dtr April Ayse provides history and info on life at home. She is hoping to get her parents moved closer to her in [place] so she can be easier involved in their care. Pt requires help with some ADL's he has a walker and transfers. Last stay pt went to [facility] for rehab prior to returning home and family would like information on skilled are options for rehab. SW aware and will return info to family. Past Medical History:        Past Medical History:   Diagnosis Date    Arthritis     Asthma     Cancer ([facility] [address] 08/2018    Prostrate.  Coronary artery disease 12/17/2018    Diabetes mellitus ([facility] [address]     Hypercholesterolemia 12/17/2018    Hypertension     Hypothyroidism 07/06/2018    Palliative care patient 12/30/2021    Pancreatitis     Rheumatic fever     as child    Systolic congestive heart failure ([facility] [address] 12/17/2018       Advance Directives:   Full code  ACP note completed  No advance directive on file. Left information and documents for them to review and let us know when they are ready to execute. Plan:    Continue work up and medical treatments    Patient/family discussion r/t goals:  Goal is to return home, but will consider rehab prior to home.     Review of any needed services:  TBD    Palliative Care will continue to follow and support        Electronically signed by Clarence Godoy RN on 4/27/2022 at 2:29 PM

## 2022-04-27 NOTE — CARE COORDINATION
Pt was previously at [facility] in [facility] from 12/31/2021 to 1/19/2022. Family took Pt home before his co-pay days started. WILMER met with Pt daughter Mary Grace Harmon in the room and updated her on the status of Pt skilled days available. April Ayse stated they will most likely look at placement again for him at [facility] as long as Pt spouse is in agreement. April Tavera stated the facility did really well with therapy and taking care of Pt while he was there. WILMER left contact card with Pt spouse and asked to be called if she needed further assistance.    Electronically signed by Valente Carter on 4/27/2022 at 3:16 PM

## 2022-04-27 NOTE — PROGRESS NOTES
Physician Progress Note      PATIENTLeita Clause  CSN #:                  [id number]  :                       1950  ADMIT DATE:       2022 2:03 PM  [address] [place] DATE:  RESPONDING  PROVIDER #:        Licha Ramos MD          QUERY TEXT:    Patient admitted with NAKUL/ATN. Pt. noted to have pericardial effusion on the   CT abd./pelvis dated 22. If possible, please document in progress notes   and discharge summary if you are evaluating and/or treating any of the   following: The medical record reflects the following:  Risk Factors: CHF, HTN  Clinical Indicators: CT results as noted \" There is a small pericardial   effusion decreased in volume compared with previous CT\",  Treatment: Serial Trops, CT abd./pelvis,    Thank you in advance,    Yumiko Eckert, RN-BSN, [facility]  Clinical   [facility],  Rashida Lucas El[email or web address] com  Options provided:  -- Pericardial Effusion  -- Pericardial Effusion not clinically significant  -- Other - I will add my own diagnosis  -- Disagree - Not applicable / Not valid  -- Disagree - Clinically unable to determine / Unknown  -- Refer to Clinical Documentation Reviewer    PROVIDER RESPONSE TEXT:    Pericardial effusion is not clinically significant.     Query created by: Cesar Matias on 2022 12:36 PM      Electronically signed by:  Licha Ramos MD 2022 3:45 PM

## 2022-04-27 NOTE — PROGRESS NOTES
4 Eyes Skin Assessment    Luigi Head is being assessed upon: Admission    I agree that Casie Thompson RN, along with Aliza Medellin (either 2 RN's or 1 LPN and 1 RN) have performed a thorough Head to Toe Skin Assessment on the patient. ALL assessment sites listed below have been assessed. Areas assessed by both nurses:     [x]   Head, Face, and Ears   [x]   Shoulders, Back, and Chest  [x]   Arms, Elbows, and Hands   [x]   Coccyx, Sacrum, and Ischium  [x]   Legs, Feet, and Heels    Does the Patient have Skin Breakdown?  No    Vignesh Prevention initiated: No  Wound Care Orders initiated: NA    [facility] nurse consulted for Pressure Injury (Stage 3,4, Unstageable, DTI, NWPT, and Complex wounds) and New or Established Ostomies: NA        Primary Nurse eSignature: Shravan Carvajal RN on 4/26/2022 at 9:30 PM      Co-Signer eSignature: Electronically signed by Vignesh Freitas RN on 4/26/22 at 9:44 PM CDT

## 2022-04-27 NOTE — PROGRESS NOTES
Progress Note  Date:2022       Room:0537/537-01  Patient Name:Luigi ORDOÑEZ JR     YOB: 1950     Age:72 y.o. Subjective    Subjective   Patient seen and examined this a.m. daughter present at bedside. Patient extremely fatigued, was arousable admittedly weak cannot ambulate due to poor nutrition state. Currently denies headache, change in vision, chest pain, fevers or chills. Cumulative Hospital course: Patient admitted 326, 76-year-old  male with a known past medical history of CHF, paroxysmal atrial fibrillation currently on anticoagulation, permanent pacemaker, prostate cancer, chronic pancreatitis, hypertension, hyperlipidemia, diabetes, uncontrolled hypothyroidism, coronary artery disease presented to the emergency room due to generalized weakness, malaise over the past weeks time associated with nausea vomiting diarrhea states he had a fall at home due to worsening weakness. ER work-up revealed mild mucosal thickening/probable mild colitis, evidence of likely chronic pancreatitis, slight hyponatremia, elevated creatinine, proBNP of 853, respiratory panel negative, mild elevation of troponin, urinalysis with evidence of ketones. Patient admitted to medical floor placed on antibiotics for colitis, note of significantly elevated TSH of 100, initiated on low-dose Synthroid. Conversation had with daughter at the bedside difficulties at home, PT/OT consultation appreciate social work for safe discharge disposition planning. GI PCR panel ordered. Review of Systems   ROS: 14 point review of systems is negative except as specifically addressed above.     Objective         Vitals Last 24 Hours:  TEMPERATURE:  Temp  Av.4 °F (36.3 °C)  Min: 97.2 °F (36.2 °C)  Max: 97.5 °F (36.4 °C)  RESPIRATIONS RANGE: Resp  Av.6  Min: 14  Max: 20  PULSE OXIMETRY RANGE: SpO2  Av.9 %  Min: 94 %  Max: 98 %  PULSE RANGE: Pulse  Av.3  Min: 59  Max: 101  BLOOD PRESSURE RANGE: Systolic (02EWZ), STM:465 , Min:84 , GEU:549   ; Diastolic (94NKN), OBD:36, Min:49, Max:103    I/O (24Hr): Intake/Output Summary (Last 24 hours) at 4/27/2022 1447  Last data filed at 4/27/2022 1050  Gross per 24 hour   Intake 1950 ml   Output 20 ml   Net 1930 ml     Physical Exam  Vitals and nursing note reviewed. Constitutional:       Appearance: He is ill-appearing. Comments: Extremely weak/fatigued  appearing   HENT:      Head: Normocephalic and atraumatic. Nose: Nose normal.      Mouth/Throat:      Mouth: Mucous membranes are dry. Eyes:      General:         Right eye: No discharge. Cardiovascular:      Rate and Rhythm: Normal rate and regular rhythm. Pulses: Normal pulses. Pulmonary:      Effort: Pulmonary effort is normal. No respiratory distress. Abdominal:      General: There is distension. Tenderness: There is no guarding or rebound. Musculoskeletal:         General: No tenderness. Skin:     Capillary Refill: Capillary refill takes less than 2 seconds. Coloration: Skin is pale. Neurological:      Mental Status: He is alert and oriented to person, place, and time. Motor: Weakness present. Psychiatric:         Mood and Affect: Mood normal.         Labs/Imaging/Diagnostics    Labs:  CBC:  Recent Labs     04/26/22  1410 04/27/22  0705   WBC 6.6 5.0   RBC 3.72* 3.20*   HGB 12.0* 10.4*   HCT 34.1* 29.9*   MCV 91.7 93.4   RDW 12.3 12.3    160     CHEMISTRIES:  Recent Labs     04/26/22  1410 04/26/22  1421 04/27/22  0705   *  --  137   K 3.9  --  3.7   CL 96*  --  101   CO2 25  --  26   BUN 30*  --  27*   CREATININE 1.8*  --  1.6*   GLUCOSE 172*  --  136*   PHOS  --  3.7  --    MG  --  2.1  --      PT/INR:No results for input(s): PROTIME, INR in the last 72 hours. APTT:No results for input(s): APTT in the last 72 hours.   LIVER PROFILE:  Recent Labs     04/26/22  1410   AST 25   ALT 29   BILITOT 0.6   ALKPHOS 78       Imaging Last 24 Hours:  CT ABDOMEN PELVIS WO CONTRAST Additional Contrast? None    Addendum Date: 4/26/2022    Addendum: There is a transcription error in the impression. Under impression #1, the third sentence should read \"There is no evidence of free air or free fluid identified\". End addendum. Signed by Dr Inocente Samayoa    Result Date: 4/26/2022  ** ORIGINAL REPORT ** EXAMINATION: CT ABDOMEN PELVIS WO CONTRAST 4/26/2022 5:48 PM HISTORY: Acute abdominal pain, history of pancreatitis with stent placement in the common bile duct. DOSE: 377 mGycm. Automatic exposure control was utilized in an effort to use as little radiation as possible, without compromising image quality. REPORT: Spiral CT of the abdomen and pelvis was performed without contrast from the lung bases through the pubic symphysis. Reconstructed coronal and sagittal images are also reviewed. COMPARISON: CT abdomen and pelvis with and without contrast 2/17/2020. Review of lung windows and straight mild bibasilar atelectasis. No pleural effusion is identified. There is artifact associated with pacemaker wires. There is a small pericardial effusion, decreased in volume compared with the previous CT. Heart size appears to be normal. Cholecystectomy clips are present. The liver is homogeneous without evidence of a mass. There are scattered calcified granulomas in the spleen. Spleen size is normal. The stomach is decompressed. There is a oval mass at the tail the pancreas, measuring approximately 3.0 x 1.5 cm. This demonstrates coarse rim calcification. A similar mass was seen in this location on the previous study this is essentially unchanged except for an increase in the volume of peripheral calcification around the mass. The internal attenuation is in the soft tissue range. No pancreatic ductal dilation is identified. The adrenal glands appear within normal limits.  Small nonobstructing nephrolithiasis are present, the largest stone is at the inferior pole the left kidney measures approximately 3 mm. There is no hydronephrosis. The ureters are decompressed. No free fluid or free air is identified. Radiation seeds are noted in the prostate gland. The prostate gland is mildly enlarged. The bladder appears within normal limits. There is mild calcification thickening involving the cecum and ascending colon extending close to the hepatic flexure suspicious for mild colitis, though the pericolonic fat planes are normal. Underdistention artifact could have a similar appearance. Review of bone windows shows no acute abnormality, there is advanced degenerative disc disease at L4-5.    1. Mild mucosal thickening and probable mild colitis involving the cecum and ascending colon, versus underdistention distention artifact. The pericolonic fat planes are preserved in this region. Evidence of free air or free fluid is identified. No intra-abdominal abscess. 2. Oval low-attenuation mass in the tail the pancreas with coarse rim calcifications which have increased, may reflect changes of chronic pancreatitis, with an encapsulated pseudocyst, this measures approximately 3.0 x 1.5 cm. Follow-up is recommended to exclude the less likely possibility of pancreatic neoplasm. 3. Previous cholecystectomy is noted. 4. Small nonobstructing bilateral nephrolithiasis are present. 5. Small pericardial effusion, which has decreased in volume compared with the previous CT. Signed by Dr Mago Avila.  [place]** ADDENDUM #1 **    Assessment//Plan           Hospital Problems           Last Modified POA    * (Principal) NAKUL (acute kidney injury) ([address] 4/26/2022 Yes    Coronary artery disease 4/26/2022 Yes    Hypercholesterolemia 4/26/2022 Yes    Ischemic cardiomyopathy 4/26/2022 Yes    Paroxysmal atrial fibrillation ([address] 4/26/2022 Yes    Colitis 4/26/2022 Yes    Abnormal CT of the abdomen 4/26/2022 Yes    Elevated troponin 4/26/2022 Yes    Essential hypertension 4/26/2022 Yes    Diabetes ([address] 4/26/2022 Yes    Hypothyroidism 4/26/2022 Yes    Adenocarcinoma of prostate, stage 2 ([facility] [address] 4/26/2022 Yes    Overview Signed 11/5/2021  3:04 PM by Arun Mejia MD     Formatting of this note might be different from the original.  Stage IIC (T2c, N0, cM0) [place] 8 Adenocarcinoma of the Prostate with Pre PSA of 7.6         Nausea vomiting and diarrhea 4/26/2022 Yes    Moderate malnutrition ([address] 4/26/2022 Yes        Abnormal CT abdomen/mild colitis unspecified  -Continue to encourage oral fluids, oral intake  -Continue antibiotic therapy  -GI PCR panel ordered  -Maintain electrolyte stability  -Antiemetic therapy as needed    Paroxysmal atrial fibrillation  -Condition, continue rate control modalities, Eliquis      Uncontrolled hypothyroidism  -Chronic condition, reviewed chart with patient's daughter in room today noted elevation of TSH in March, subsequent repeat value today noted to be 100 TSH  -Instituted low dose of Synthroid      Moderate dehydration/CKD stage III/elevated troponin/moderate malnutrition/neurolyse muscle weakness  -Continue to encourage oral intake  -Dietary consultation  -Intravenous fluid  -Renally dose medications, avoid nephrotoxic agent  -Urinalysis reviewed  -PT/OT consultation      Type 2 diabetes insulin-dependent  -Chronic condition, hold oral antihyperglycemic's, continue insulin modalities, Accu-Cheks q. ACH S, hypoglycemic protocol      Coronary artery disease  -Condition, continue Plavix, aspirin regimen,      Adenocarcinoma of the prostate, stage II   -Chronic condition, monitor patient's I/os      Chronic systolic congestive heart failure/ICD  -Prior TTE reviewed, continue with heart failure regimen, monitor patient's I/os        EMR Dragon/Transcription disclaimer:   Much of this encounter note is an electronic transcription/translation of spoken language to printed text.  The electronic translation of spoken language may permit erroneous, or at times, nonsensical words or phrases to be inadvertently transcribed; although attempts have made to review the note for such errors, some may still exist.      Electronically signed by   El Blanchard MD   Internal Medicine Hospitalist  On 4/27/2022  At 2:47 PM

## 2022-04-27 NOTE — ACP (ADVANCE CARE PLANNING)
Advance Care Planning     Advance Care Planning Activator (Inpatient)  Conversation Note      Date of ACP Conversation: 4/27/2022     Conversation Conducted with: Pt's daughter Elizabeth Gonzalez    ACP Activator: Oly Putnam RN      Health Care Decision Maker:     Current Designated Health Care Decision Maker:     Primary Decision Maker: Daniel Rasmussen - [phone]    Secondary Decision Maker: Genia Aguiar Child - [phone]    Secondary Decision Maker: Stephanie Cloud - Child - [phone]      Care Preferences    Ventilation: \"If you were in your present state of health and suddenly became very ill and were unable to breathe on your own, what would your preference be about the use of a ventilator (breathing machine) if it were available to you? \"      Would the patient desire the use of ventilator (breathing machine)?:   YES          Resuscitation  \"CPR works best to restart the heart when there is a sudden event, like a heart attack, in someone who is otherwise healthy. Unfortunately, CPR does not typically restart the heart for people who have serious health conditions or who are very sick. \"    \"In the event your heart stopped as a result of an underlying serious health condition, would you want attempts to be made to restart your heart (answer \"yes\" for attempt to resuscitate) or would you prefer a natural death (answer \"no\" for do not attempt to resuscitate)? \"    YES          Conversation Outcomes:  [x] ACP discussion completed

## 2022-04-28 NOTE — PLAN OF CARE
Nutrition Problem #1: Inadequate oral intake  Intervention: Food and/or Nutrient Delivery: Continue Current Diet,Start Oral Nutrition Supplement  Nutritional Goals: PO intake 75% or greater,by next RD assessment (and diet advance)    Problem: Nutrition Deficit:  Goal: Optimize nutritional status  4/28/2022 0933 by Tawny Sanz MS, RD, LD  Outcome: Progressing  4/28/2022 0743 by Aimee Wiley RN  Outcome: Progressing  4/28/2022 0132 by Nohemi Barth  Outcome: Not Progressing

## 2022-04-28 NOTE — PROGRESS NOTES
Patient aerobic blood cultures came back positive for gram positive rods. Notified the night hospitalist, and will also notify the day hospitalist..

## 2022-04-28 NOTE — PROGRESS NOTES
Progress Note  Date:2022       Room:0537/537-01  Patient Name:Luigi ORDOÑEZ JR     YOB: 1950     Age:72 y.o. Subjective    Subjective   Patient seen and examined this a.m. sitting up in bed consuming breakfast, mentation clinical picture overall improved since day prior. Currently denies headache, change in vision, chest pain, fevers or chills. Cumulative Hospital course: Patient admitted 326, 65-year-old  male with a known past medical history of CHF, paroxysmal atrial fibrillation currently on anticoagulation, permanent pacemaker, prostate cancer, chronic pancreatitis, hypertension, hyperlipidemia, diabetes, uncontrolled hypothyroidism, coronary artery disease presented to the emergency room due to generalized weakness, malaise over the past weeks time associated with nausea vomiting diarrhea states he had a fall at home due to worsening weakness. ER work-up revealed mild mucosal thickening/probable mild colitis, evidence of likely chronic pancreatitis, slight hyponatremia, elevated creatinine, proBNP of 853, respiratory panel negative, mild elevation of troponin, urinalysis with evidence of ketones. Patient admitted to medical floor placed on antibiotics for colitis, note of significantly elevated TSH of 100, initiated on low-dose Synthroid. PT/OT evaluations ongoing, assessed by dietitian. 1/2 bottles with growth deemed to be likely contaminant. Patient continues on antibiotic course for colitis. Anticipating discharge to rehab facility. Review of Systems   ROS: 14 point review of systems is negative except as specifically addressed above.     Objective         Vitals Last 24 Hours:  TEMPERATURE:  Temp  Av.4 °F (36.3 °C)  Min: 96.8 °F (36 °C)  Max: 97.9 °F (36.6 °C)  RESPIRATIONS RANGE: Resp  Av  Min: 16  Max: 16  PULSE OXIMETRY RANGE: SpO2  Av.7 %  Min: 98 %  Max: 100 %  PULSE RANGE: Pulse  Av.8  Min: 60  Max: 63  BLOOD PRESSURE RANGE: Systolic (06WCV), HFQ:354 , Min:108 , MYQ:987   ; Diastolic (42DOV), HYH:08, Min:61, Max:69    I/O (24Hr): Intake/Output Summary (Last 24 hours) at 4/28/2022 1028  Last data filed at 4/28/2022 0919  Gross per 24 hour   Intake 3819 ml   Output 300 ml   Net 3519 ml     Physical Exam  Vitals and nursing note reviewed. Constitutional:       Comments: Clinical tone improved today, patient still fatigued   HENT:      Head: Normocephalic and atraumatic. Nose: Nose normal.      Mouth/Throat:      Mouth: Mucous membranes are dry. Eyes:      General:         Right eye: No discharge. Cardiovascular:      Rate and Rhythm: Normal rate and regular rhythm. Pulses: Normal pulses. Pulmonary:      Effort: Pulmonary effort is normal. No respiratory distress. Abdominal:      General: There is distension. Tenderness: There is no guarding or rebound. Musculoskeletal:         General: No tenderness. Skin:     Capillary Refill: Capillary refill takes less than 2 seconds. Coloration: Skin is pale. Neurological:      Mental Status: He is alert and oriented to person, place, and time. Motor: Weakness present. Psychiatric:         Mood and Affect: Mood normal.         Labs/Imaging/Diagnostics    Labs:  CBC:  Recent Labs     04/26/22  1410 04/27/22  0705 04/28/22  0337   WBC 6.6 5.0 3.8*   RBC 3.72* 3.20* 2.77*   HGB 12.0* 10.4* 8.9*   HCT 34.1* 29.9* 25.5*   MCV 91.7 93.4 92.1   RDW 12.3 12.3 12.1    160 142     CHEMISTRIES:  Recent Labs     04/26/22  1410 04/26/22  1421 04/27/22  0705 04/28/22  0337   *  --  137 135*   K 3.9  --  3.7 3.1*   CL 96*  --  101 101   CO2 25  --  26 22   BUN 30*  --  27* 20   CREATININE 1.8*  --  1.6* 1.3*   GLUCOSE 172*  --  136* 108   PHOS  --  3.7  --   --    MG  --  2.1  --  1.9     PT/INR:No results for input(s): PROTIME, INR in the last 72 hours. APTT:No results for input(s): APTT in the last 72 hours.   LIVER PROFILE:  Recent Labs     04/26/22  1410 AST 25   ALT 29   BILITOT 0.6   ALKPHOS 78       Imaging Last 24 Hours:  CT ABDOMEN PELVIS WO CONTRAST Additional Contrast? None    Addendum Date: 4/26/2022    Addendum: There is a transcription error in the impression. Under impression #1, the third sentence should read \"There is no evidence of free air or free fluid identified\". End addendum. Signed by Dr Quintero Poster    Result Date: 4/26/2022  ** ORIGINAL REPORT ** EXAMINATION: CT ABDOMEN PELVIS WO CONTRAST 4/26/2022 5:48 PM HISTORY: Acute abdominal pain, history of pancreatitis with stent placement in the common bile duct. DOSE: 377 mGycm. Automatic exposure control was utilized in an effort to use as little radiation as possible, without compromising image quality. REPORT: Spiral CT of the abdomen and pelvis was performed without contrast from the lung bases through the pubic symphysis. Reconstructed coronal and sagittal images are also reviewed. COMPARISON: CT abdomen and pelvis with and without contrast 2/17/2020. Review of lung windows and straight mild bibasilar atelectasis. No pleural effusion is identified. There is artifact associated with pacemaker wires. There is a small pericardial effusion, decreased in volume compared with the previous CT. Heart size appears to be normal. Cholecystectomy clips are present. The liver is homogeneous without evidence of a mass. There are scattered calcified granulomas in the spleen. Spleen size is normal. The stomach is decompressed. There is a oval mass at the tail the pancreas, measuring approximately 3.0 x 1.5 cm. This demonstrates coarse rim calcification. A similar mass was seen in this location on the previous study this is essentially unchanged except for an increase in the volume of peripheral calcification around the mass. The internal attenuation is in the soft tissue range. No pancreatic ductal dilation is identified. The adrenal glands appear within normal limits.  Small nonobstructing nephrolithiasis are present, the largest stone is at the inferior pole the left kidney measures approximately 3 mm. There is no hydronephrosis. The ureters are decompressed. No free fluid or free air is identified. Radiation seeds are noted in the prostate gland. The prostate gland is mildly enlarged. The bladder appears within normal limits. There is mild calcification thickening involving the cecum and ascending colon extending close to the hepatic flexure suspicious for mild colitis, though the pericolonic fat planes are normal. Underdistention artifact could have a similar appearance. Review of bone windows shows no acute abnormality, there is advanced degenerative disc disease at L4-5.    1. Mild mucosal thickening and probable mild colitis involving the cecum and ascending colon, versus underdistention distention artifact. The pericolonic fat planes are preserved in this region. Evidence of free air or free fluid is identified. No intra-abdominal abscess. 2. Oval low-attenuation mass in the tail the pancreas with coarse rim calcifications which have increased, may reflect changes of chronic pancreatitis, with an encapsulated pseudocyst, this measures approximately 3.0 x 1.5 cm. Follow-up is recommended to exclude the less likely possibility of pancreatic neoplasm. 3. Previous cholecystectomy is noted. 4. Small nonobstructing bilateral nephrolithiasis are present. 5. Small pericardial effusion, which has decreased in volume compared with the previous CT. Signed by Dr Sandra Morin.  [place]** ADDENDUM #1 **    Assessment//Plan           Hospital Problems           Last Modified POA    * (Principal) NAKUL (acute kidney injury) ([address] 4/26/2022 Yes    Coronary artery disease 4/26/2022 Yes    Hypercholesterolemia 4/26/2022 Yes    Ischemic cardiomyopathy 4/26/2022 Yes    Paroxysmal atrial fibrillation ([address] 4/26/2022 Yes    Colitis 4/26/2022 Yes    Abnormal CT of the abdomen 4/26/2022 Yes    Elevated troponin 4/26/2022 Yes    Essential hypertension 4/26/2022 Yes    Diabetes ([facility] [address] 4/26/2022 Yes    Hypothyroidism 4/26/2022 Yes    Adenocarcinoma of prostate, stage 2 ([address] 4/26/2022 Yes    Overview Signed 11/5/2021  3:04 PM by Bertrand Rowe MD     Formatting of this note might be different from the original.  Stage IIC (T2c, N0, cM0) [place] 8 Adenocarcinoma of the Prostate with Pre PSA of 7.6         Nausea vomiting and diarrhea 4/26/2022 Yes    Moderate malnutrition ([address] 4/26/2022 Yes        Abnormal CT abdomen/mild colitis unspecified  -Continue to encourage oral fluids, oral intake  -Continue antibiotic therapy  -GI PCR panel ordered  -Maintain electrolyte stability  -Antiemetic therapy as needed      Blood cultures deemed to be contamination  -We will continue to follow final results      Generalized muscle weakness  -PT/OT/dietary consultation, anticipate discharge to rehab facility for strengthening      Paroxysmal atrial fibrillation  -Condition, continue rate control modalities, Eliquis      Uncontrolled hypothyroidism  -Chronic condition, reviewed chart with patient's daughter in room today noted elevation of TSH in March, subsequent repeat value today noted to be 100 TSH  - continue Synthroid      Moderate dehydration/CKD stage III/elevated troponin/moderate malnutrition/neurolyse muscle weakness  -Continue to encourage oral intake  -Dietary consultation  -Intravenous fluid  -Renally dose medications, avoid nephrotoxic agent  -Urinalysis reviewed  -PT/OT consultation      Type 2 diabetes insulin-dependent  -Chronic condition, hold oral antihyperglycemic's, continue insulin modalities, Accu-Cheks q.  ACH S, hypoglycemic protocol      Coronary artery disease  -Condition, continue Plavix, aspirin regimen,      Adenocarcinoma of the prostate, stage II   -Chronic condition, monitor patient's I/os      Chronic systolic congestive heart failure/ICD  -Prior TTE reviewed, continue with heart failure regimen, monitor patient's I/os        EMR Dragon/Transcription disclaimer:   Much of this encounter note is an electronic transcription/translation of spoken language to printed text.  The electronic translation of spoken language may permit erroneous, or at times, nonsensical words or phrases to be inadvertently transcribed; although attempts have made to review the note for such errors, some may still exist.      Electronically signed by   Adra Eisenmenger, MD   Internal Medicine Hospitalist  On 4/28/2022  At 10:28 AM

## 2022-04-28 NOTE — CARE COORDINATION
Date / Time of Evaluation: 4/28/2022 11:14 AM  Assessment Completed by: Corrina Carlos, RN, BSN    Patient Admission Status:     [address]    [phone] (home)   Telephone Information:   Mobile [phone]       (Best Practice:  Have patient / caregiver verify above address and phone number by stating out loud their current address and reachable phone number.)  Is above information correct? yes      Current PCP:  LO Garcia CNP    Initial Assessment Completed at bedside with:  family    Emergency Contacts:  Extended Emergency Contact Information  Primary Emergency Contact: Alma Daughters of [address] [facility] Phone: [phone]  Mobile Phone: [phone]  Relation: Child  Secondary Emergency Contact: Dick Leyva  Address: [facility] [address] [facility] of [address] [facility] Phone: [phone]  Mobile Phone: [phone]  Relation: Spouse    Advance Directives: Code Status:  Full Code    Financial:  Payor: MEDICARE / Plan: MEDICARE PART A AND B / Product Type: *No Product type* /     Pre-Cert required for SNF:  no    Pharmacy:   [place] [address]  Phone: [phone] Fax: [phone]      Potential assistance purchasing medications? No    ADLS:  Support System:  Family support    Current Home Environment:  house  Steps:  Ramp    Plans to RETURN to current housing: Yes  Barriers to RETURNING to current housing:  Ability to ambulate and to do ADL's with minimal assistance    Currently ACTIVE with Home Health CARE:  No  [facility]:      [facility] [place]  N/A     Transition Plan:  Home with family and [facility] or [facility] for Discharge:  Family/ambulance    Additional CM/SW Notes:   I spoke with patient's daughter Carol Lindsay. She states that he lives at home with spouse.  She states that he has a walker, bedside commode, and shower chair at home. She states that he has utilized [facility] in the past. I reviewed the PT/OT notes with her and that they suggested short term rehab. Daughter states that she will discuss with her Dad and Mom. She states she will give me a call back with a decision for home with [facility] or short term rehab. Will continue to follow.     Micaela Tidwell RN, BSN  [facility]  Care Management Department  Ph:  [phone] Fax: [phone]    Electronically signed by Micaela Tidwell RN, BSN on 4/28/2022 at 11:20 AM

## 2022-04-28 NOTE — PROGRESS NOTES
Occupational Therapy  Facility/Department: [facility] SURG SERVICES  Occupational Therapy Initial Assessment    Name: Rufus Purcell  : 1950  MRN: [id number]  Date of Service: 2022    Discharge Recommendations:  Patient would benefit from continued therapy after discharge          Patient Diagnosis(es): The primary encounter diagnosis was Acute kidney injury (NAKUL) with acute tubular necrosis (ATN) ([facility] [address] Diagnoses of Dehydration and Nausea vomiting and diarrhea were also pertinent to this visit. Past Medical History:  has a past medical history of Arthritis, Asthma, Cancer ([facility] [address] Coronary artery disease, Diabetes mellitus ([facility] [address] Hypercholesterolemia, Hypertension, Hypothyroidism, Palliative care patient, Pancreatitis, Rheumatic fever, and Systolic congestive heart failure ([facility] [address] Past Surgical History:  has a past surgical history that includes Appendectomy; Cholecystectomy; and Pancreas surgery. Treatment Diagnosis: Weakness, fall at home, decreased mobility. Colitis, elevated troponin,      Assessment   Assessment: Evaluation completed and tx initiated. The patient would benefit from further tx to upgrade functional status. Likely to make significant gains with skilled therapy intervention  Treatment Diagnosis: Weakness, fall at home, decreased mobility. Colitis, elevated troponin,  History: CHF, DM2, Defibrillatory  REQUIRES OT FOLLOW-UP: Yes  Activity Tolerance  Activity Tolerance: Patient Tolerated treatment well        Plan   Plan  Times per Week: 3-5     Restrictions  Restrictions/Precautions  Restrictions/Precautions: Fall Risk  Required Braces or Orthoses?: Yes  Required Braces or Orthoses  Right Lower Extremity Brace:  Ankle Foot Orthotics  Position Activity Restriction  Other position/activity restrictions: pt HAS HIS SHOES AND AFO PRESENT IN ROOM    Subjective   General  Chart Reviewed: Yes  Patient assessed for rehabilitation services?: Yes  Family / Caregiver Present: No     Social/Functional History  Social/Functional History  Lives With: Spouse  Home Equipment: [employer], rolling  ADL Assistance: Needs assistance (prn assist, mostly does it himself per patient but is difficult)  Ambulation Assistance: Independent  Transfer Assistance: Independent       Objective   Pulse: 60  Heart Rate Source: Monitor  BP: 116/61  BP Location: Left upper arm  Patient Position: Supine  MAP (Calculated): 79.33  Resp: 16  SpO2: 98 %  O2 Device: None (Room air)  Hearing: Within functional limits          Safety Devices  Type of Devices: Call light within reach; Left in chair;Nurse notified     Toilet Transfers  Toilet - Technique: Stand step  Equipment Used: Standard bedside commode  Toilet Transfer: Minimal assistance     ADL  Feeding: Independent  Grooming: Independent;Setup  UE Bathing: Minimal assistance  LE Bathing: Minimal assistance; Moderate assistance  UE Dressing: Supervision  LE Dressing: Minimal assistance; Moderate assistance  Toileting: Minimal assistance; Moderate assistance     Activity Tolerance  Activity Tolerance: Patient tolerated evaluation without incident;Patient limited by endurance; Patient limited by fatigue  Bed mobility  Supine to Sit: Stand by assistance (HOB SLIGHLTY ELEVATED, USES BEDRAIL)  Transfers  Stand Step Transfers: Minimal assistance     Cognition  Overall Cognitive Status: WFL                     LUE AROM (degrees)  LUE General AROM: shoulder flexion 0-80 in sitting, distally WFL except decreased ulnar motor/sensory function in the hands  RUE PROM (degrees)  RUE General PROM: shoulder flexion 0-80 in sitting, distally WFL except decreased ulnar motor/sensory function in the hands                             Goals  Short Term Goals  Short Term Goal 1: CGA for dressing  Short Term Goal 2: CGA for toileting  Short Term Goal 3: CGA for transfers  Short Term Goal 4: CGA for light ambulatory ADL  Short Term Goal 5:  Independent with therapeutic activity recommendations  Long Term Goals  Long Term Goal 1: Upgrade as tolerated       Tx initiated:  Balance activities, mobility strategies for ADL (30 mins)            Sundeep Coy OT Electronically signed by Sundeep Coy OT on 4/28/2022 at 10:36 AM

## 2022-04-28 NOTE — PROGRESS NOTES
Physical Therapy  Facility/Department: [facility] SURG SERVICES  Physical Therapy Initial Assessment    Name: Rufus Schumacher  : 1950  MRN: [id number]  Date of Service: 2022    Discharge Recommendations:  Continue to assess pending progress,Patient would benefit from continued therapy after discharge          Patient Diagnosis(es): The primary encounter diagnosis was Acute kidney injury (NAKUL) with acute tubular necrosis (ATN) ([facility] [address] Diagnoses of Dehydration and Nausea vomiting and diarrhea were also pertinent to this visit. Past Medical History:  has a past medical history of Arthritis, Asthma, Cancer ([facility] [address] Coronary artery disease, Diabetes mellitus ([facility] [address] Hypercholesterolemia, Hypertension, Hypothyroidism, Palliative care patient, Pancreatitis, Rheumatic fever, and Systolic congestive heart failure ([facility] [address] Past Surgical History:  has a past surgical history that includes Appendectomy; Cholecystectomy; and Pancreas surgery. Assessment   Body Structures, Functions, Activity Limitations Requiring Skilled Therapeutic Intervention: Decreased functional mobility ; Decreased strength;Decreased posture;Decreased balance;Decreased endurance  Assessment: pt WOULD BENEFIT FROM SKILLED PT IN THIS SETTING TO PROGRESS HIS STRENGTH AND ENDURANCE. Pt IS A GOOD CANDIDATE FOR SHORT TERM REHAB PRIOR TO D/C HOME  Therapy Prognosis: Good  Decision Making: Low Complexity  Activity Tolerance  Activity Tolerance: Patient tolerated evaluation without incident;Patient limited by endurance; Patient limited by fatigue     Plan   Plan  Plan: 5-7 times per week  Plan weeks: 2  Current Treatment Recommendations: Strengthening,Balance training,Functional mobility training,Transfer training,Safety education & training,Gait training,Patient/Caregiver education & training,Therapeutic activities  Plan Comment: CONTINUE TO PROGRESS MOBILITY AS TOLERATED BY FATIGUE.   Safety Devices  Type of Devices: Call light within reach,Gait belt,Left in chair,Chair alarm in place,Nurse notified     Restrictions  Restrictions/Precautions  Restrictions/Precautions: Fall Risk  Required Braces or Orthoses?: Yes  Required Braces or Orthoses  Right Lower Extremity Brace: Ankle Foot Orthotics  Position Activity Restriction  Other position/activity restrictions: pt HAS HIS SHOES AND AFO PRESENT IN ROOM     Subjective   General  Patient assessed for rehabilitation services?: Yes  Diagnosis: NAKUL, MALAISE, WEAKNESS  Follows Commands: Within Functional Limits  General Comment  Comments: pt IS INCONTINENT. DEPEND PLACED ON Pt. Subjective  Subjective: pt STATES HE IS GENERALLY WEAK AND FATIGUES EASILY BUT IS READY TO WORK WITH PT. DENIES PAIN. Social/Functional History  Social/Functional History  Lives With: Spouse  Home Equipment: Walker, rolling  ADL Assistance: Needs assistance (prn assist, mostly does it himself per patient but is difficult)  Ambulation Assistance: Independent  Transfer Assistance: Independent  Vision/Hearing  Hearing: Within functional limits    Cognition   Orientation  Overall Orientation Status: Within Functional Limits     Objective   Pulse: 60  Heart Rate Source: Monitor  BP: 116/61  BP Location: Left upper arm  Patient Position: Supine  MAP (Calculated): 79.33  Resp: 16  SpO2: 98 %  O2 Device: None (Room air)              AROM RLE (degrees)  RLE AROM: WFL  RLE General AROM: FOOT DROP  AROM LLE (degrees)  LLE AROM : WFL  Strength RLE  Comment: GROSSLY -3 TO 3/5 WITH 0/5 FOR ANKLE DF  Strength LLE  Strength LLE: WFL           Bed mobility  Supine to Sit: Stand by assistance (HOB SLIGHLTY ELEVATED, USES BEDRAIL)  Transfers  Sit to Stand: Contact guard assistance;Minimal Assistance  Stand to sit: Contact guard assistance;Minimal Assistance  Ambulation  Surface: level tile  Device: Rolling Walker  Other Apparatus: AFO  Assistance: Contact guard assistance;Minimal assistance  Quality of Gait: VERY SLOW AND GUARDED.  DIFFICULTY ADVANCING R LE EVEN WITH AFO IN PLACE. ASSIST TO MAINTAIN RW IN PROPER POSTION FOR MAXIMAL SUPPORT. Gait Deviations: Decreased step length;Decreased step height  Distance: 4-5 STEPS FW/BW.  Pt REPORTED FATIGUE AND COULD NOT AMB FARTHER     Balance  Sitting - Static: Good  Sitting - Dynamic: Good  Comments: ABLE TO STAND AND PULL UP DEPENDS WITH R HAND WITH L ON WALKER AND L LE BRACING AGAINST BED             Goals  Short Term Goals  Time Frame for Short term goals: 2 WKS  Short term goal 1: SIT<>STAND, SBA  Short term goal 2:  FT WITH RW, AFO AND CGA       Therapy Time   Individual Concurrent Group Co-treatment   Time In           Time Out           Minutes                   Pura Dhaliwal PT    Electronically signed by Pura Dhaliwal PT on 4/28/2022 at 10:14 AM

## 2022-04-28 NOTE — PLAN OF CARE
Problem: Discharge Planning  Goal: Discharge to home or other facility with appropriate resources  4/28/2022 0743 by Leni Palmer RN  Outcome: Progressing  4/28/2022 0132 by Иван Woods  Outcome: Progressing  4/28/2022 0131 by Иван Woods  Outcome: Progressing     Problem: Safety - Adult  Goal: Free from fall injury  4/28/2022 0743 by Leni Palmer RN  Outcome: Progressing  4/28/2022 0132 by Иван Woods  Outcome: Progressing  4/28/2022 0131 by Иван Woods  Outcome: Progressing     Problem: ABCDS Injury Assessment  Goal: Absence of physical injury  4/28/2022 0743 by Leni Palmer RN  Outcome: Progressing  4/28/2022 0132 by Иван Woods  Outcome: Progressing  4/28/2022 0131 by Иван Woods  Outcome: Progressing     Problem: Chronic Conditions and Co-morbidities  Goal: Patient's chronic conditions and co-morbidity symptoms are monitored and maintained or improved  4/28/2022 0743 by Leni Palmer RN  Outcome: Progressing  4/28/2022 0132 by Иван Woods  Outcome: Progressing  4/28/2022 0131 by Иван Woods  Outcome: Progressing     Problem: Nutrition Deficit:  Goal: Optimize nutritional status  4/28/2022 0743 by Leni Palmer RN  Outcome: Progressing  4/28/2022 0132 by Иван Woods  Outcome: Not Progressing

## 2022-04-28 NOTE — CONSULTS
Comprehensive Nutrition Assessment    Type and Reason for Visit:  Initial,Consult    Nutrition Recommendations/Plan:   1. Ensure Clear TID. 2. Monitor for diet advance. Malnutrition Assessment:  Malnutrition Status: At risk for malnutrition (Comment) (04/28/22 0966)    Context:  Acute Illness     Findings of the 6 clinical characteristics of malnutrition:  Energy Intake:  50% or less of estimated energy requirements for 5 or more days  Weight Loss:  No significant weight loss     Body Fat Loss:  Unable to assess     Muscle Mass Loss:  Unable to assess    Fluid Accumulation:  No significant fluid accumulation     Strength:  Not Performed    Nutrition Assessment:    Consult for poor po intake recieved. Pt at risk for nutrition compromise AEB poor po intake PTA d/t N/V. Pt currently on clear liquid diet, glucose 108-156. Will add Ensure clear TID and monior for diet advance/Carb control needs. Nutrition Related Findings:    NS @ 100 ml/hr Wound Type: None       Current Nutrition Intake & Therapies:    Average Meal Intake: 51-75% (Clears)  Average Supplements Intake: None Ordered  ADULT DIET; Clear Liquid    Anthropometric Measures:  Height: 5' 10\" (177.8 cm)  Ideal Body Weight (IBW): 166 lbs (75 kg)    Admission Body Weight: 140 lb (63.5 kg)  Current Body Weight: 140 lb (63.5 kg),   IBW.  Weight Source: Not Specified  Current BMI (kg/m2): 20.1  Usual Body Weight: 140 lb (63.5 kg) (5/2021)  % Weight Change (Calculated): 0                    BMI Categories: Underweight (BMI less than 22) age over 72    Estimated Daily Nutrient Needs:  Energy Requirements Based On: Kcal/kg  Weight Used for Energy Requirements: Current (25-30 kcals/kg)  Energy (kcal/day): 4737-2712 kcals/day  Weight Used for Protein Requirements: Current (1.2-1.5 g/kg)  Protein (g/day): 76-95 g/pro/day  Method Used for Fluid Requirements: 1 ml/kcal  Fluid (ml/day): 4933-1172 mL/fluid/day    Nutrition Diagnosis:   · Inadequate oral intake related to altered GI function as evidenced by poor intake prior to admission,nausea,vomiting    Nutrition Interventions:   Food and/or Nutrient Delivery: Continue Current Diet,Start Oral Nutrition Supplement  Nutrition Education/Counseling: No recommendation at this time  Coordination of Nutrition Care: Continue to monitor while inpatient       Goals:     Goals: PO intake 75% or greater,by next RD assessment (and diet advance)       Nutrition Monitoring and Evaluation:   Behavioral-Environmental Outcomes: None Identified  Food/Nutrient Intake Outcomes: Diet Advancement/Tolerance,Food and Nutrient Intake,Supplement Intake  Physical Signs/Symptoms Outcomes: Biochemical Data,Nutrition Focused Physical Findings,Weight,Nausea or Vomiting    Discharge Planning:     Too soon to determine     Calos Levine MS, RD, LD  Contact: [phone]

## 2022-04-29 NOTE — CARE COORDINATION
Family have accepted bed offer at [facility]. Can come Monday if bed available. Patient will need and updated Negative Covid Swab prior to discharge.     [facility]  [phone] F  Electronically signed by Micaela Tidwell RN, BSN on 4/29/2022 at 3:03 PM

## 2022-04-29 NOTE — CARE COORDINATION
I spoke with patient 's family and has agreed for referral to [facility] FOR PHYSICAL REHABILITATION. Referral sent.  Awaiting acceptance/denial.    [facility] PHYSICAL REHABILITATION   F  [phone] F  Electronically signed by Dawson Eisenberg, RN, BSN on 4/29/2022 at 9:35 AM

## 2022-04-29 NOTE — PROGRESS NOTES
Progress Note  Date:2022       Room:0537/537-01  Patient Name:Luigi ORDOÑEZ JR     YOB: 1950     Age:72 y.o. Subjective    Subjective   Patient seen and examined this a.m. daughter present at the bedside. Patient with no acute concerns. Discussion had an agreement made on transition to skilled nursing facility. Cumulative Hospital course: Patient admitted 326, 78-year-old  male with a known past medical history of CHF, paroxysmal atrial fibrillation currently on anticoagulation, permanent pacemaker, prostate cancer, chronic pancreatitis, hypertension, hyperlipidemia, diabetes, uncontrolled hypothyroidism, coronary artery disease presented to the emergency room due to generalized weakness, malaise over the past weeks time associated with nausea vomiting diarrhea states he had a fall at home due to worsening weakness. ER work-up revealed mild mucosal thickening/probable mild colitis, evidence of likely chronic pancreatitis, slight hyponatremia, elevated creatinine, proBNP of 853, respiratory panel negative, mild elevation of troponin, urinalysis with evidence of ketones. Patient admitted to medical floor placed on antibiotics for colitis, note of significantly elevated TSH of 100, initiated on low-dose Synthroid. PT/OT evaluations ongoing, assessed by dietitian. 1/2 bottles with growth deemed to be likely contaminant. Patient continues on antibiotic course for colitis. Anticipating discharge to rehab facility. Review of Systems   ROS: 14 point review of systems is negative except as specifically addressed above.     Objective         Vitals Last 24 Hours:  TEMPERATURE:  Temp  Av °F (36.7 °C)  Min: 97.5 °F (36.4 °C)  Max: 98.4 °F (36.9 °C)  RESPIRATIONS RANGE: Resp  Av  Min: 16  Max: 16  PULSE OXIMETRY RANGE: SpO2  Av.8 %  Min: 95 %  Max: 98 %  PULSE RANGE: Pulse  Av.2  Min: 59  Max: 61  BLOOD PRESSURE RANGE: Systolic (06QEW), AFA:016 , Min:104 , FRR:305   ; Diastolic (91KXA), NDS:56, Min:59, Max:81    I/O (24Hr): Intake/Output Summary (Last 24 hours) at 4/29/2022 0943  Last data filed at 4/28/2022 2022  Gross per 24 hour   Intake 720 ml   Output 450 ml   Net 270 ml     Physical Exam  Vitals and nursing note reviewed. Constitutional:       General: He is not in acute distress. Appearance: He is not toxic-appearing. Comments: Clinical tone improved today, patient still fatigued   HENT:      Head: Normocephalic and atraumatic. Nose: Nose normal.      Mouth/Throat:      Mouth: Mucous membranes are dry. Eyes:      General:         Right eye: No discharge. Cardiovascular:      Rate and Rhythm: Normal rate and regular rhythm. Pulses: Normal pulses. Pulmonary:      Effort: Pulmonary effort is normal. No respiratory distress. Abdominal:      General: There is distension. Tenderness: There is no guarding or rebound. Musculoskeletal:         General: No tenderness. Skin:     Capillary Refill: Capillary refill takes less than 2 seconds. Coloration: Skin is pale. Neurological:      Mental Status: He is alert and oriented to person, place, and time. Motor: Weakness present. Psychiatric:         Mood and Affect: Mood normal.         Labs/Imaging/Diagnostics    Labs:  CBC:  Recent Labs     04/27/22  0705 04/28/22  0337 04/29/22  0620   WBC 5.0 3.8* 3.7*   RBC 3.20* 2.77* 2.86*   HGB 10.4* 8.9* 9.3*   HCT 29.9* 25.5* 25.3*   MCV 93.4 92.1 88.5   RDW 12.3 12.1 11.9    142 137     CHEMISTRIES:  Recent Labs     04/26/22  1410 04/26/22  1421 04/27/22  0705 04/28/22  0337 04/29/22  0620   NA   < >  --  137 135* 133*   K   < >  --  3.7 3.1* 3.6   CL   < >  --  101 101 101   CO2   < >  --  26 22 22   BUN   < >  --  27* 20 11   CREATININE   < >  --  1.6* 1.3* 0.9   GLUCOSE   < >  --  136* 108 129*   PHOS  --  3.7  --   --   --    MG  --  2.1  --  1.9  --     < > = values in this interval not displayed.      PT/INR:No results for input(s): PROTIME, INR in the last 72 hours. APTT:No results for input(s): APTT in the last 72 hours. LIVER PROFILE:  Recent Labs     04/26/22  1410   AST 25   ALT 29   BILITOT 0.6   ALKPHOS 78       Imaging Last 24 Hours:  CT ABDOMEN PELVIS WO CONTRAST Additional Contrast? None    Addendum Date: 4/26/2022    Addendum: There is a transcription error in the impression. Under impression #1, the third sentence should read \"There is no evidence of free air or free fluid identified\". End addendum. Signed by Dr Inocente Samayoa    Result Date: 4/26/2022  ** ORIGINAL REPORT ** EXAMINATION: CT ABDOMEN PELVIS WO CONTRAST 4/26/2022 5:48 PM HISTORY: Acute abdominal pain, history of pancreatitis with stent placement in the common bile duct. DOSE: 377 mGycm. Automatic exposure control was utilized in an effort to use as little radiation as possible, without compromising image quality. REPORT: Spiral CT of the abdomen and pelvis was performed without contrast from the lung bases through the pubic symphysis. Reconstructed coronal and sagittal images are also reviewed. COMPARISON: CT abdomen and pelvis with and without contrast 2/17/2020. Review of lung windows and straight mild bibasilar atelectasis. No pleural effusion is identified. There is artifact associated with pacemaker wires. There is a small pericardial effusion, decreased in volume compared with the previous CT. Heart size appears to be normal. Cholecystectomy clips are present. The liver is homogeneous without evidence of a mass. There are scattered calcified granulomas in the spleen. Spleen size is normal. The stomach is decompressed. There is a oval mass at the tail the pancreas, measuring approximately 3.0 x 1.5 cm. This demonstrates coarse rim calcification. A similar mass was seen in this location on the previous study this is essentially unchanged except for an increase in the volume of peripheral calcification around the mass.  The internal attenuation is in the soft tissue range. No pancreatic ductal dilation is identified. The adrenal glands appear within normal limits. Small nonobstructing nephrolithiasis are present, the largest stone is at the inferior pole the left kidney measures approximately 3 mm. There is no hydronephrosis. The ureters are decompressed. No free fluid or free air is identified. Radiation seeds are noted in the prostate gland. The prostate gland is mildly enlarged. The bladder appears within normal limits. There is mild calcification thickening involving the cecum and ascending colon extending close to the hepatic flexure suspicious for mild colitis, though the pericolonic fat planes are normal. Underdistention artifact could have a similar appearance. Review of bone windows shows no acute abnormality, there is advanced degenerative disc disease at L4-5.    1. Mild mucosal thickening and probable mild colitis involving the cecum and ascending colon, versus underdistention distention artifact. The pericolonic fat planes are preserved in this region. Evidence of free air or free fluid is identified. No intra-abdominal abscess. 2. Oval low-attenuation mass in the tail the pancreas with coarse rim calcifications which have increased, may reflect changes of chronic pancreatitis, with an encapsulated pseudocyst, this measures approximately 3.0 x 1.5 cm. Follow-up is recommended to exclude the less likely possibility of pancreatic neoplasm. 3. Previous cholecystectomy is noted. 4. Small nonobstructing bilateral nephrolithiasis are present. 5. Small pericardial effusion, which has decreased in volume compared with the previous CT. Signed by Dr Anuja Parekh.  [place]** ADDENDUM #1 **    Assessment//Plan           Hospital Problems           Last Modified POA    * (Principal) NAKUL (acute kidney injury) ([facility] [address] 4/26/2022 Yes    Coronary artery disease 4/26/2022 Yes    Hypercholesterolemia 4/26/2022 Yes    Ischemic cardiomyopathy 4/26/2022 Yes    Paroxysmal atrial fibrillation ([address] 4/26/2022 Yes    Colitis 4/26/2022 Yes    Abnormal CT of the abdomen 4/26/2022 Yes    Elevated troponin 4/26/2022 Yes    Essential hypertension 4/26/2022 Yes    Diabetes ([address] 4/26/2022 Yes    Hypothyroidism 4/26/2022 Yes    Adenocarcinoma of prostate, stage 2 ([address] 4/26/2022 Yes    Overview Signed 11/5/2021  3:04 PM by Annalise Clark MD     Formatting of this note might be different from the original.  Stage IIC (T2c, N0, cM0) [place] 8 Adenocarcinoma of the Prostate with Pre PSA of 7.6         Nausea vomiting and diarrhea 4/26/2022 Yes    Moderate malnutrition ([address] 4/26/2022 Yes        Abnormal CT abdomen/mild colitis unspecified  -Continue to encourage oral fluids, oral intake  -Continue antibiotic therapy  -GI PCR panel ordered  -Maintain electrolyte stability  -Antiemetic therapy as needed      Blood cultures deemed to be contamination  -We will continue to follow final results      Generalized muscle weakness  -PT/OT/dietary consultation, anticipate discharge to rehab facility for strengthening  -Reviewed with case management, referral sent      Paroxysmal atrial fibrillation  -Condition, continue rate control modalities, Eliquis      Uncontrolled hypothyroidism  -Chronic condition, reviewed chart with patient's daughter in room today noted elevation of TSH in March, subsequent repeat value today noted to be 100 TSH  - continue Synthroid      Moderate dehydration/CKD stage III/elevated troponin/moderate malnutrition/neurolyse muscle weakness  -Continue to encourage oral intake  -Dietary consultation  -Intravenous fluid  -Renally dose medications, avoid nephrotoxic agent  -Urinalysis reviewed  -PT/OT consultation      Type 2 diabetes insulin-dependent  -Chronic condition, hold oral antihyperglycemic's, continue insulin modalities, Accu-Cheks q.  ACH S, hypoglycemic protocol      Coronary artery disease  -Condition, continue Plavix, aspirin regimen,      Adenocarcinoma of the prostate, stage II   -Chronic condition, monitor patient's I/os      Chronic systolic congestive heart failure/ICD  -Prior TTE reviewed, continue with heart failure regimen, monitor patient's I/os        EMR Dragon/Transcription disclaimer:   Much of this encounter note is an electronic transcription/translation of spoken language to printed text.  The electronic translation of spoken language may permit erroneous, or at times, nonsensical words or phrases to be inadvertently transcribed; although attempts have made to review the note for such errors, some may still exist.      Electronically signed by   Cyndy Pichardo MD   Internal Medicine Hospitalist  On 4/29/2022  At 9:43 AM

## 2022-04-29 NOTE — PROGRESS NOTES
Physician Progress Note      Jaylyn Simmons  CSN #:                  [id number]  :                       1950  ADMIT DATE:       2022 2:03 PM  [address] [place] DATE:  RESPONDING  PROVIDER #:        Monique Styles MD          QUERY TEXT:    Patient admitted with NAKUL. Noted documentation of moderate malnutrition on   progress notes by Dr. Chelsie Guardado, throughout the admission. . In order to support   the diagnosis of Moderate Malnutrition, please include additional clinical   indicators in your documentation. Or please document if the diagnosis of   Moderate Malnutrition has been ruled out after further study. The medical record reflects the following:  Risk Factors: NAKUL, CAD, poor oral intake  Clinical Indicators: Dietary consult on 22 only notes \"at risk for\", \"no   significant weight loss, no significant fluid accumulation\"  BMI: 20.1, with hgt: 5'10\" and Weight: 140lbs  Treatment: Dietary consults, Ensure TID, Advanced CARB diet    Thank you in advance,    Star Arenas, RN-BSN, [facility]  Clinical   [facility], [facility] [email or web address]  Options provided:  -- Moderate Malnutrition present as evidenced by, Please document evidence. -- Moderate Malnutrition was ruled out  -- Other - I will add my own diagnosis  -- Disagree - Not applicable / Not valid  -- Disagree - Clinically unable to determine / Unknown  -- Refer to Clinical Documentation Reviewer    PROVIDER RESPONSE TEXT:    Moderate Malnutrition was ruled out after study.     Query created by: Beto Gaston on 2022 12:56 PM      Electronically signed by:  Monique Styles MD 2022 1:22 PM

## 2022-04-30 NOTE — PROGRESS NOTES
Progress Note  Date:2022       Room:0537/537-01  Patient Name:Luigi ORDOÑEZ JR     YOB: 1950     Age:72 y.o. Subjective    Subjective   Patient seen and examined this a.m. alert and oriented consuming breakfast.  Denies pain. Understands plans for rehab placement beginning of the week. Cumulative Hospital course: Patient admitted 326, 76-year-old  male with a known past medical history of CHF, paroxysmal atrial fibrillation currently on anticoagulation, permanent pacemaker, prostate cancer, chronic pancreatitis, hypertension, hyperlipidemia, diabetes, uncontrolled hypothyroidism, coronary artery disease presented to the emergency room due to generalized weakness, malaise over the past weeks time associated with nausea vomiting diarrhea states he had a fall at home due to worsening weakness. ER work-up revealed mild mucosal thickening/probable mild colitis, evidence of likely chronic pancreatitis, slight hyponatremia, elevated creatinine, proBNP of 853, respiratory panel negative, mild elevation of troponin, urinalysis with evidence of ketones. Patient admitted to medical floor placed on antibiotics for colitis, note of significantly elevated TSH of 100, initiated on low-dose Synthroid. PT/OT evaluations ongoing, assessed by dietitian. Blood cultures with likely contaminant. Continues on antibiotic course transition to p.o. Cipro/Flagyl for colitis bout. Anticipate discharge to nursing facility at the beginning of the week. Review of Systems   ROS: 14 point review of systems is negative except as specifically addressed above.     Objective         Vitals Last 24 Hours:  TEMPERATURE:  Temp  Av.1 °F (36.7 °C)  Min: 97.7 °F (36.5 °C)  Max: 98.4 °F (36.9 °C)  RESPIRATIONS RANGE: Resp  Av.8  Min: 16  Max: 18  PULSE OXIMETRY RANGE: SpO2  Av %  Min: 93 %  Max: 100 %  PULSE RANGE: Pulse  Av.8  Min: 59  Max: 61  BLOOD PRESSURE RANGE: Systolic (12FED), Av , Min:112 , ZWP:301   ; Diastolic (37MCE), TGJ:55, Min:59, Max:97    I/O (24Hr): Intake/Output Summary (Last 24 hours) at 2022 0941  Last data filed at 2022 8213  Gross per 24 hour   Intake 1260 ml   Output 550 ml   Net 710 ml     Physical Exam  Vitals and nursing note reviewed. Constitutional:       General: He is not in acute distress. Appearance: He is not toxic-appearing. HENT:      Head: Normocephalic and atraumatic. Nose: Nose normal.      Mouth/Throat:      Mouth: Mucous membranes are dry. Eyes:      General:         Right eye: No discharge. Cardiovascular:      Rate and Rhythm: Normal rate and regular rhythm. Pulses: Normal pulses. Pulmonary:      Effort: Pulmonary effort is normal. No respiratory distress. Abdominal:      General: There is distension. Tenderness: There is no guarding or rebound. Musculoskeletal:         General: No tenderness. Skin:     Capillary Refill: Capillary refill takes less than 2 seconds. Coloration: Skin is pale. Neurological:      Mental Status: He is alert and oriented to person, place, and time. Motor: Weakness present. Psychiatric:         Mood and Affect: Mood normal.         Labs/Imaging/Diagnostics    Labs:  CBC:  Recent Labs     22  0337 22  0620 22  0346   WBC 3.8* 3.7* 3.4*   RBC 2.77* 2.86* 2.62*   HGB 8.9* 9.3* 8.6*   HCT 25.5* 25.3* 23.9*   MCV 92.1 88.5 91.2   RDW 12.1 11.9 12.5    137 135     CHEMISTRIES:  Recent Labs     22  0337 22  0620 22  0346   * 133* 137   K 3.1* 3.6 3.1*    101 106   CO2 22 22 21*   BUN 20 11 8   CREATININE 1.3* 0.9 0.9   GLUCOSE 108 129* 150*   MG 1.9  --  1.7     PT/INR:No results for input(s): PROTIME, INR in the last 72 hours. APTT:No results for input(s): APTT in the last 72 hours. LIVER PROFILE:  No results for input(s): AST, ALT, BILIDIR, BILITOT, ALKPHOS in the last 72 hours.     Imaging Last 24 Hours:  CT ABDOMEN PELVIS WO CONTRAST Additional Contrast? None    Addendum Date: 4/26/2022    Addendum: There is a transcription error in the impression. Under impression #1, the third sentence should read \"There is no evidence of free air or free fluid identified\". End addendum. Signed by Dr Diane Rawls    Result Date: 4/26/2022  ** ORIGINAL REPORT ** EXAMINATION: CT ABDOMEN PELVIS WO CONTRAST 4/26/2022 5:48 PM HISTORY: Acute abdominal pain, history of pancreatitis with stent placement in the common bile duct. DOSE: 377 mGycm. Automatic exposure control was utilized in an effort to use as little radiation as possible, without compromising image quality. REPORT: Spiral CT of the abdomen and pelvis was performed without contrast from the lung bases through the pubic symphysis. Reconstructed coronal and sagittal images are also reviewed. COMPARISON: CT abdomen and pelvis with and without contrast 2/17/2020. Review of lung windows and straight mild bibasilar atelectasis. No pleural effusion is identified. There is artifact associated with pacemaker wires. There is a small pericardial effusion, decreased in volume compared with the previous CT. Heart size appears to be normal. Cholecystectomy clips are present. The liver is homogeneous without evidence of a mass. There are scattered calcified granulomas in the spleen. Spleen size is normal. The stomach is decompressed. There is a oval mass at the tail the pancreas, measuring approximately 3.0 x 1.5 cm. This demonstrates coarse rim calcification. A similar mass was seen in this location on the previous study this is essentially unchanged except for an increase in the volume of peripheral calcification around the mass. The internal attenuation is in the soft tissue range. No pancreatic ductal dilation is identified. The adrenal glands appear within normal limits.  Small nonobstructing nephrolithiasis are present, the largest stone is at the inferior pole the left kidney measures approximately 3 mm. There is no hydronephrosis. The ureters are decompressed. No free fluid or free air is identified. Radiation seeds are noted in the prostate gland. The prostate gland is mildly enlarged. The bladder appears within normal limits. There is mild calcification thickening involving the cecum and ascending colon extending close to the hepatic flexure suspicious for mild colitis, though the pericolonic fat planes are normal. Underdistention artifact could have a similar appearance. Review of bone windows shows no acute abnormality, there is advanced degenerative disc disease at L4-5.    1. Mild mucosal thickening and probable mild colitis involving the cecum and ascending colon, versus underdistention distention artifact. The pericolonic fat planes are preserved in this region. Evidence of free air or free fluid is identified. No intra-abdominal abscess. 2. Oval low-attenuation mass in the tail the pancreas with coarse rim calcifications which have increased, may reflect changes of chronic pancreatitis, with an encapsulated pseudocyst, this measures approximately 3.0 x 1.5 cm. Follow-up is recommended to exclude the less likely possibility of pancreatic neoplasm. 3. Previous cholecystectomy is noted. 4. Small nonobstructing bilateral nephrolithiasis are present. 5. Small pericardial effusion, which has decreased in volume compared with the previous CT. Signed by Dr Lola Dietz.  [place]** ADDENDUM #1 **    Assessment//Plan           Hospital Problems           Last Modified POA    * (Principal) NAKUL (acute kidney injury) ([address] 4/26/2022 Yes    Coronary artery disease 4/26/2022 Yes    Hypercholesterolemia 4/26/2022 Yes    Ischemic cardiomyopathy 4/26/2022 Yes    Paroxysmal atrial fibrillation ([address] 4/26/2022 Yes    Colitis 4/26/2022 Yes    Abnormal CT of the abdomen 4/26/2022 Yes    Elevated troponin 4/26/2022 Yes    Essential hypertension 4/26/2022 Yes    Diabetes ([address] 4/26/2022 Yes    Hypothyroidism 4/26/2022 Yes    Adenocarcinoma of prostate, stage 2 ([facility] [address] 4/26/2022 Yes    Overview Signed 11/5/2021  3:04 PM by Claudia Walton MD     Formatting of this note might be different from the original.  Stage IIC (T2c, N0, cM0) [place] 8 Adenocarcinoma of the Prostate with Pre PSA of 7.6         Nausea vomiting and diarrhea 4/26/2022 Yes    Moderate malnutrition ([facility] [address] 4/26/2022 Yes        Abnormal CT abdomen/mild colitis unspecified  -Continue to encourage oral fluids, oral intake  -Continue antibiotic therapy  -GI PCR panel ordered  -Maintain electrolyte stability  -Antiemetic therapy as needed      Hypokalemia  -Continue with supplementation, fluids transition to contain potassium      Blood cultures deemed to be contamination  -We will continue to follow final results      Generalized muscle weakness  -PT/OT/dietary consultation, anticipate discharge to rehab facility for strengthening  -Reviewed with case management, accepted to rehab facility for beginning of next week depending on bed availability will need repeat COVID screen      Paroxysmal atrial fibrillation  -Chronic condition, continue rate control modalities, Eliquis      Uncontrolled hypothyroidism  -Chronic condition, reviewed chart with patient's daughter in room today noted elevation of TSH in March, subsequent repeat value today noted to be 100 TSH  - continue Synthroid      Moderate dehydration/CKD stage III/elevated troponin/moderate malnutrition/neurolyse muscle weakness  -Continue to encourage oral intake  -Dietary consultation  -Intravenous fluid  -Renally dose medications, avoid nephrotoxic agent  -Urinalysis reviewed  -PT/OT consultation      Type 2 diabetes insulin-dependent  -Chronic condition, hold oral antihyperglycemic's, continue insulin modalities, Accu-Cheks q.  ACH S, hypoglycemic protocol      Coronary artery disease  -Condition, continue Plavix, aspirin regimen,      Adenocarcinoma of the prostate, stage II   -Chronic condition, monitor patient's I/os      Chronic systolic congestive heart failure/ICD  -Prior TTE reviewed, continue with heart failure regimen, monitor patient's I/os        EMR Dragon/Transcription disclaimer:   Much of this encounter note is an electronic transcription/translation of spoken language to printed text.  The electronic translation of spoken language may permit erroneous, or at times, nonsensical words or phrases to be inadvertently transcribed; although attempts have made to review the note for such errors, some may still exist.      Electronically signed by   Anibal Pichardo MD   Internal Medicine Hospitalist  On 4/30/2022  At 9:41 AM

## 2022-05-01 NOTE — PROGRESS NOTES
Progress Note  Date:2022       Room:0537/537-01  Patient Name:Luigi ORDOÑEZ JR     YOB: 1950     Age:72 y.o. Subjective    Subjective   Patient seen while resting in bed reporting had 1 episode of loose stools. He denies abdominal pain fever or any other new complaints. Cumulative Hospital course: Patient admitted 326, 76-year-old  male with a known past medical history of CHF, paroxysmal atrial fibrillation currently on anticoagulation, permanent pacemaker, prostate cancer, chronic pancreatitis, hypertension, hyperlipidemia, diabetes, uncontrolled hypothyroidism, coronary artery disease presented to the emergency room due to generalized weakness, malaise over the past weeks time associated with nausea vomiting diarrhea states he had a fall at home due to worsening weakness. ER work-up revealed mild mucosal thickening/probable mild colitis, evidence of likely chronic pancreatitis, slight hyponatremia, elevated creatinine, proBNP of 853, respiratory panel negative, mild elevation of troponin, urinalysis with evidence of ketones. Patient admitted to medical floor placed on antibiotics for colitis, note of significantly elevated TSH of 100, initiated on low-dose Synthroid. PT/OT evaluations ongoing, assessed by dietitian. Blood cultures with likely contaminant. Continues on antibiotic course transition to p.o. Cipro/Flagyl for colitis bout. Anticipate discharge to nursing facility at the beginning of the week. Review of Systems   ROS: 14 point review of systems is negative except as specifically addressed above.     Objective         Vitals Last 24 Hours:  TEMPERATURE:  Temp  Av.8 °F (36.6 °C)  Min: 96.1 °F (35.6 °C)  Max: 98.6 °F (37 °C)  RESPIRATIONS RANGE: Resp  Av.3  Min: 16  Max: 18  PULSE OXIMETRY RANGE: SpO2  Av %  Min: 96 %  Max: 99 %  PULSE RANGE: Pulse  Av  Min: 60  Max: 63  BLOOD PRESSURE RANGE: Systolic (29MYF), XML:309 , Min:128 , YPQ:502   ; Diastolic (42QQU), AUR:04, Min:68, Max:83    I/O (24Hr): Intake/Output Summary (Last 24 hours) at 5/1/2022 1129  Last data filed at 5/1/2022 7464  Gross per 24 hour   Intake 720 ml   Output 700 ml   Net 20 ml     Physical Exam  Vitals and nursing note reviewed. Constitutional:       General: He is not in acute distress. Appearance: He is not toxic-appearing. HENT:      Head: Normocephalic and atraumatic. Nose: Nose normal.      Mouth/Throat:      Mouth: Mucous membranes are dry. Eyes:      General:         Right eye: No discharge. Cardiovascular:      Rate and Rhythm: Normal rate and regular rhythm. Pulses: Normal pulses. Pulmonary:      Effort: Pulmonary effort is normal. No respiratory distress. Abdominal:      General: There is distension. Tenderness: There is no guarding or rebound. Musculoskeletal:         General: No tenderness. Skin:     Capillary Refill: Capillary refill takes less than 2 seconds. Coloration: Skin is pale. Neurological:      Mental Status: He is alert and oriented to person, place, and time. Motor: Weakness present. Psychiatric:         Mood and Affect: Mood normal.         Labs/Imaging/Diagnostics    Labs:  CBC:  Recent Labs     04/29/22  0620 04/30/22  0346 05/01/22  0245   WBC 3.7* 3.4* 4.2*   RBC 2.86* 2.62* 2.62*   HGB 9.3* 8.6* 8.5*   HCT 25.3* 23.9* 23.8*   MCV 88.5 91.2 90.8   RDW 11.9 12.5 12.5    135 129*     CHEMISTRIES:  Recent Labs     04/29/22  0620 04/29/22  0620 04/30/22  0346 04/30/22  1345 05/01/22  0245   *  --  137  --  136   K 3.6   < > 3.1* 3.2* 4.0     --  106  --  109   CO2 22  --  21*  --  20*   BUN 11  --  8  --  7*   CREATININE 0.9  --  0.9  --  0.9   GLUCOSE 129*  --  150*  --  122*   MG  --   --  1.7  --   --     < > = values in this interval not displayed. PT/INR:No results for input(s): PROTIME, INR in the last 72 hours. APTT:No results for input(s):  APTT in the last 72 hours.  LIVER PROFILE:  No results for input(s): AST, ALT, BILIDIR, BILITOT, ALKPHOS in the last 72 hours. Imaging Last 24 Hours:  CT ABDOMEN PELVIS WO CONTRAST Additional Contrast? None    Addendum Date: 4/26/2022    Addendum: There is a transcription error in the impression. Under impression #1, the third sentence should read \"There is no evidence of free air or free fluid identified\". End addendum. Signed by Dr Lakeshia Alvarado    Result Date: 4/26/2022  ** ORIGINAL REPORT ** EXAMINATION: CT ABDOMEN PELVIS WO CONTRAST 4/26/2022 5:48 PM HISTORY: Acute abdominal pain, history of pancreatitis with stent placement in the common bile duct. DOSE: 377 mGycm. Automatic exposure control was utilized in an effort to use as little radiation as possible, without compromising image quality. REPORT: Spiral CT of the abdomen and pelvis was performed without contrast from the lung bases through the pubic symphysis. Reconstructed coronal and sagittal images are also reviewed. COMPARISON: CT abdomen and pelvis with and without contrast 2/17/2020. Review of lung windows and straight mild bibasilar atelectasis. No pleural effusion is identified. There is artifact associated with pacemaker wires. There is a small pericardial effusion, decreased in volume compared with the previous CT. Heart size appears to be normal. Cholecystectomy clips are present. The liver is homogeneous without evidence of a mass. There are scattered calcified granulomas in the spleen. Spleen size is normal. The stomach is decompressed. There is a oval mass at the tail the pancreas, measuring approximately 3.0 x 1.5 cm. This demonstrates coarse rim calcification. A similar mass was seen in this location on the previous study this is essentially unchanged except for an increase in the volume of peripheral calcification around the mass. The internal attenuation is in the soft tissue range. No pancreatic ductal dilation is identified.  The adrenal glands appear within normal limits. Small nonobstructing nephrolithiasis are present, the largest stone is at the inferior pole the left kidney measures approximately 3 mm. There is no hydronephrosis. The ureters are decompressed. No free fluid or free air is identified. Radiation seeds are noted in the prostate gland. The prostate gland is mildly enlarged. The bladder appears within normal limits. There is mild calcification thickening involving the cecum and ascending colon extending close to the hepatic flexure suspicious for mild colitis, though the pericolonic fat planes are normal. Underdistention artifact could have a similar appearance. Review of bone windows shows no acute abnormality, there is advanced degenerative disc disease at L4-5.    1. Mild mucosal thickening and probable mild colitis involving the cecum and ascending colon, versus underdistention distention artifact. The pericolonic fat planes are preserved in this region. Evidence of free air or free fluid is identified. No intra-abdominal abscess. 2. Oval low-attenuation mass in the tail the pancreas with coarse rim calcifications which have increased, may reflect changes of chronic pancreatitis, with an encapsulated pseudocyst, this measures approximately 3.0 x 1.5 cm. Follow-up is recommended to exclude the less likely possibility of pancreatic neoplasm. 3. Previous cholecystectomy is noted. 4. Small nonobstructing bilateral nephrolithiasis are present. 5. Small pericardial effusion, which has decreased in volume compared with the previous CT. Signed by Dr Jimi Donovan.  Yesenia** ADDENDUM #1 **    Assessment//Plan           Hospital Problems           Last Modified POA    * (Principal) NAKUL (acute kidney injury) ([address] 4/26/2022 Yes    Coronary artery disease 4/26/2022 Yes    Hypercholesterolemia 4/26/2022 Yes    Ischemic cardiomyopathy 4/26/2022 Yes    Paroxysmal atrial fibrillation ([address] 4/26/2022 Yes    Colitis 4/26/2022 Yes    Abnormal CT of the abdomen 4/26/2022 Yes    Elevated troponin 4/26/2022 Yes    Essential hypertension 4/26/2022 Yes    Diabetes ([facility] 4/26/2022 Yes    Hypothyroidism 4/26/2022 Yes    Adenocarcinoma of prostate, stage 2 ([address] 4/26/2022 Yes    Overview Signed 11/5/2021  3:04 PM by Daniella Coughlin MD     Formatting of this note might be different from the original.  Stage IIC (T2c, N0, cM0) [place] 8 Adenocarcinoma of the Prostate with Pre PSA of 7.6         Nausea vomiting and diarrhea 4/26/2022 Yes    Moderate malnutrition ([address] 4/26/2022 Yes        Abnormal CT abdomen/mild colitis unspecified  -Continue to encourage oral fluids, oral intake  -Continue antibiotic therapy  -GI PCR panel ordered  -Maintain electrolyte stability  -Antiemetic therapy as needed      Hypokalemia, resolved. -Continue with supplementation, fluids transition to contain potassium      Blood cultures deemed to be contamination  -We will continue to follow final results      Generalized muscle weakness  -PT/OT/dietary consultation, anticipate discharge to rehab facility for strengthening  -As per case management, accepted to rehab facility for beginning of next week depending on bed availability will need repeat COVID screen      Paroxysmal atrial fibrillation  -Chronic condition, continue rate control modalities, Eliquis      Uncontrolled hypothyroidism  -Chronic condition, reviewed chart with patient's daughter in room today noted elevation of TSH in March, subsequent repeat value today noted to be 100 TSH  -Continue Synthroid. Moderate dehydration/CKD stage III/elevated troponin/moderate malnutrition/neurolyse muscle weakness  -Continue to encourage oral intake  -Dietary consultation  -Intravenous fluid  -Renally dose medications, avoid nephrotoxic agent  -Urinalysis reviewed  -PT/OT consultation      Type 2 diabetes insulin-dependent  -Chronic condition, hold oral antihyperglycemic's, continue insulin modalities, Accu-Cheks q.  ACH S, hypoglycemic protocol      Coronary artery disease  -Condition, continue Plavix, aspirin regimen,      Adenocarcinoma of the prostate, stage II   -Chronic condition, monitor patient's I/os      Chronic systolic congestive heart failure/ICD  -Prior TTE reviewed, continue with heart failure regimen, monitor patient's I/os        EMR Dragon/Transcription disclaimer:   Much of this encounter note is an electronic transcription/translation of spoken language to printed text.  The electronic translation of spoken language may permit erroneous, or at times, nonsensical words or phrases to be inadvertently transcribed; although attempts have made to review the note for such errors, some may still exist.      Electronically signed by   Too Sabillon MD   Internal Medicine Hospitalist  On 5/1/2022  At 11:29 AM

## 2022-05-02 PROBLEM — N17.9 AKI (ACUTE KIDNEY INJURY) (HCC): Status: RESOLVED | Noted: 2022-01-01 | Resolved: 2022-01-01

## 2022-05-02 NOTE — DISCHARGE SUMMARY
[place], [place], [address]    DEPARTMENT OF HOSPITALIST MEDICINE      DISCHARGE SUMMARY:      PATIENT NAME:  Miroslava Bahena  :  1950  MRN:  [id number]    Admission Date:   2022  2:03 PM Attending: Kimi Kruse MD   Discharge Date:   2022              PCP: Blas Thomas, LO Boateng CNP  Length of Stay: 6 days     Chief Complaint on Admission:   Chief Complaint   Patient presents with    Fatigue     x several days, N/V/D started today       Consultants:     PALLIATIVE CARE EVAL  IP CONSULT TO DIETITIAN       Discharge Problem List:   Principal Problem (Resolved):    NAKUL (acute kidney injury) ([address]  Active Problems:    Coronary artery disease    Hypercholesterolemia    Ischemic cardiomyopathy    Paroxysmal atrial fibrillation (HCC)    Colitis    Abnormal CT of the abdomen    Elevated troponin    Essential hypertension    Diabetes ([address]    Hypothyroidism    Adenocarcinoma of prostate, stage 2 ([address]    Nausea vomiting and diarrhea    Moderate malnutrition ([address]       [address]  AND  TREATMENT:  Patient admitted 326, 41-year-old  male with a known past medical history of CHF, paroxysmal atrial fibrillation currently on anticoagulation, permanent pacemaker, prostate cancer, chronic pancreatitis, hypertension, hyperlipidemia, diabetes, uncontrolled hypothyroidism, coronary artery disease presented to the emergency room due to generalized weakness, malaise over the past weeks time associated with nausea vomiting diarrhea states he had a fall at home due to worsening weakness. ER work-up revealed mild mucosal thickening/probable mild colitis, evidence of likely chronic pancreatitis, slight hyponatremia, elevated creatinine, proBNP of 853, respiratory panel negative, mild elevation of troponin, urinalysis with evidence of ketones.   Patient admitted to medical floor placed on antibiotics for colitis, note of significantly elevated TSH of 100, initiated on low-dose Synthroid. Patient to follow-up with primary care provider within 1 weeks for further evaluation and repeat TSH within 4 weeks to consider any further treatment with Synthroid. PT/OT evaluations ongoing, assessed by dietitian. Blood cultures with likely contaminant. Continues on antibiotic course transition to p.o. Cipro/Flagyl for colitis bout. Patient discharged to rehab following repeat COVID test. Anticipate discharge to nursing facility at the beginning of the week. OBJECTIVE:  BP (!) 140/68   Pulse 60   Temp 98.1 °F (36.7 °C) (Temporal)   Resp 16   Ht 5' 10\" (1.778 m)   Wt 140 lb (63.5 kg)   SpO2 95%   BMI 20.09 kg/m²       Heart: RRR no murmurs no rubs no gallops   Lungs: Bilateral fair air entry no wheezing no rhonchi   Abdomen: Soft, non-tender, no organomegaly no hepatomegaly   Extremities: No edema   Neurologic: Alert and oriented appropriate affect and intact judgment   Skin: Warm and dry           Medication List      START taking these medications    Align 4 MG Caps  Take 4 mg by mouth daily     ciprofloxacin 500 MG tablet  Commonly known as: CIPRO  Take 1 tablet by mouth every 12 hours for 9 doses     levothyroxine 50 MCG tablet  Commonly known as: SYNTHROID  Take 1 tablet by mouth Daily  Start taking on:  May 3, 2022     metroNIDAZOLE 500 MG tablet  Commonly known as: FLAGYL  Take 1 tablet by mouth every 8 hours for 15 doses        CONTINUE taking these medications    amiodarone 200 MG tablet  Commonly known as: CORDARONE     apixaban 5 MG Tabs tablet  Commonly known as: ELIQUIS  Take 1 tablet by mouth 2 times daily Resume in 3 days     aspirin EC 81 MG EC tablet  Take 1 tablet by mouth daily     carvedilol 3.125 MG tablet  Commonly known as: COREG  Take 1 tablet by mouth 2 times daily     clopidogrel 75 MG tablet  Commonly known as: PLAVIX  Take 1 tablet by mouth daily     furosemide 40 MG tablet  Commonly known as: LASIX  Take 1 tablet by mouth 2 times daily HYDROcodone-acetaminophen 7.5-325 MG per tablet  Commonly known as: NORCO     hydrocortisone 2.5 % cream     Januvia 25 MG tablet  Generic drug: SITagliptin     lisinopril 10 MG tablet  Commonly known as: PRINIVIL;ZESTRIL     Narcan 4 MG/0.1ML Liqd nasal spray  Generic drug: naloxone     potassium chloride 20 MEQ extended release tablet  Commonly known as: KLOR-CON M  Take 1 tablet by mouth 2 times daily     Sleep Aid 25 MG tablet  Generic drug: doxyLAMINE succinate           Where to Get Your Medications      These medications were sent to [facility]  [address] [address]    Phone: [phone]   · Align 4 MG Caps  · ciprofloxacin 500 MG tablet  · levothyroxine 50 MCG tablet  · metroNIDAZOLE 500 MG tablet           Laboratory Data:  Recent Labs     04/30/22  0346 05/01/22  0245 05/02/22  0316   WBC 3.4* 4.2* 6.4   HGB 8.6* 8.5* 8.7*    129* 130     Recent Labs     04/30/22  0346 04/30/22  1345 05/01/22  0245 05/02/22  0316     --  136 137   K 3.1* 3.2* 4.0 3.9     --  109 110   CO2 21*  --  20* 19*   BUN 8  --  7* 6*   CREATININE 0.9  --  0.9 0.7   GLUCOSE 150*  --  122* 173*     No results for input(s): AST, ALT, ALB, BILITOT, ALKPHOS in the last 72 hours. Troponin T: No results for input(s): TROPONINI in the last 72 hours. Pro-BNP: No results for input(s): BNP in the last 72 hours. INR: No results for input(s): INR in the last 72 hours. UA:No results for input(s): NITRITE, COLORU, PHUR, LABCAST, WBCUA, RBCUA, MUCUS, TRICHOMONAS, YEAST, BACTERIA, CLARITYU, SPECGRAV, LEUKOCYTESUR, UROBILINOGEN, BILIRUBINUR, BLOODU, GLUCOSEU, AMORPHOUS in the last 72 hours. Invalid input(s): Katie Aguilar  A1C: No results for input(s): LABA1C in the last 72 hours. ABG:No results for input(s): PHART, RIC6QSH, PO2ART, KNJ8UGH, BEART, HGBAE, U7UXUVDQ, CARBOXHGBART in the last 72 hours.          Impressions of imaging performed in 48 hours before discharge:    No results found. ISSUES TO BE ADDRESSED AT HOSPITAL FOLLOW-UP VISIT:    Advised patient to follow-up closely with PCP upon discharge for management of chronic medical issues  Please see the detailed discharge directions delivered from earlier today! Condition on Discharge: stable  Discharge Disposition: Acute Rehab    Recommended Follow Up:  Carmina Estevez, APRN - CNP  [address]  [place] [address]  [phone]    In 1 week  For reevaluation    Followup Appointments Scheduled at Time of Discharge:  Future Appointments   Date Time Provider Mike Hernandez   5/26/2022  1:00 PM Olaf Phan MD [id number]        Discharge Instructions:   Please see the discharge paperwork. Patient was seen at bedside today, and the examination shows improvement since yesterday. Detailed discharge directions delivered to the patient by myself and our nursing staff, who verbalizes understanding and is very happy and satisfied with the plan. Patient has been advised to continue all medications as prescribed and advised, and f/u with PCP within 1 week for reevaluation. Patient will be reevaluated by primary care provider with repeat TSH to determine further treatment with Synthroid at that time. . Patient is stable from medical standpoint to be discharged. Total time spent during patient evaluation and assessment, discussion with the nurse/family, addressing discharge medications/scripts and coordination of care for safe discharge was in excess of 37 minutes.       Signed Electronically:    Davis Valverde MD  11:44 AM 5/2/2022

## 2022-05-02 NOTE — DISCHARGE INSTR - DIET

## 2022-05-02 NOTE — CARE COORDINATION
Pt has been accepted at [address] [place] [address] 1. Pt is able to DC to the facility on Monday May 2nd if medically cleared. Pt will need updated negative covid test before DC. SW updated Pt daughter who prefers to take Pt by personal car.   [facility]  Adrian NEWTON  Electronically signed by Mary Bland on 5/2/2022 at 10:36 AM

## 2022-05-05 NOTE — PROGRESS NOTES
Physician Progress Note      PATIENT:               Manjit Lee  CSN #:                  [id number]  :                       1950  ADMIT DATE:       2022 2:03 PM  [address] [place] DATE:        2022 6:46 PM  RESPONDING  PROVIDER #:        Khang Crawford MD          QUERY TEXT:    Pt admitted with N/V/D and was dx. with colitis. Pt. also noted to have NAKUL. Pt noted to have \"mild mucosal thickening and probable mild colitis involving   the cecum and ascending colon\" on the CT abd. imaging. Can you further   specify if the colitis was: The medical record reflects the following:  Risk Factors: Chronic Pancreatitis  Clinical Indicators: CT imaging as noted above, antibiotic use  Treatment: Cipro and Flagyl IV, and discharged home on Cipro and Flagyl x 1   additional week    Thank you in advance,    King Adina RN-BSN, [facility], [place]  Clinical   [facility], [place], [address] [email or web address]  Options provided:  -- Bacterial Colitis  -- Colitis with prophylactic antibiotic use  -- Other - I will add my own diagnosis  -- Disagree - Not applicable / Not valid  -- Disagree - Clinically unable to determine / Unknown  -- Refer to Clinical Documentation Reviewer    PROVIDER RESPONSE TEXT:    This patient has bacterial colitis.     Query created by: Chente Mg on 2022 8:52 AM      Electronically signed by:  Khang Crawford MD 2022 4:16 PM

## 2022-05-26 PROBLEM — R77.8 ELEVATED TROPONIN: Status: RESOLVED | Noted: 2022-01-01 | Resolved: 2022-01-01

## 2022-05-26 PROBLEM — R79.89 ELEVATED TROPONIN: Status: RESOLVED | Noted: 2022-01-01 | Resolved: 2022-01-01

## 2022-05-26 NOTE — PROGRESS NOTES
[facility] Progress Note                            Date:  5/26/2022  Patient: Stacy Keita  Age:  67 y. o., 1950      Reason for evaluation:         SUBJECTIVE:    Returns today follow-up assessment follow-up for coronary artery disease ischemic cardiomyopathy chronic congestive heart failure ICD paroxysmal atrial fibrillation hypertension hyperlipidemia. He was in the hospital for dehydration a few weeks ago I believe he went to short-term nursing rehab just now home for about a week or so. Overall seems to be doing reasonably well. Very weak does not exercise walks very little in a wheelchair most the time. Device interrogated functioning appropriately. No reported ICD shocks. Denies chest pain dyspnea stable no new complaints or issues otherwise reported. Blood pressure 112/60 heart 61. Review of Systems   Constitutional: Negative. Negative for chills, fever and unexpected weight change. HENT: Negative. Eyes: Negative. Respiratory: Negative. Negative for shortness of breath. Cardiovascular: Negative. Negative for chest pain. Gastrointestinal: Negative. Negative for diarrhea, nausea and vomiting. Endocrine: Negative. Genitourinary: Negative. Musculoskeletal: Negative. Skin: Negative. Neurological: Negative. All other systems reviewed and are negative. OBJECTIVE:     /60   Pulse 61   Ht 5' 10\" (1.778 m)   Wt 140 lb (63.5 kg)   BMI 20.09 kg/m²     Labs:   CBC: No results for input(s): WBC, HGB, HCT, PLT in the last 72 hours. BMP:No results for input(s): NA, K, CO2, BUN, CREATININE, LABGLOM, GLUCOSE in the last 72 hours. BNP: No results for input(s): BNP in the last 72 hours. PT/INR: No results for input(s): PROTIME, INR in the last 72 hours. APTT:No results for input(s): APTT in the last 72 hours. CARDIAC ENZYMES:No results for input(s): CKTOTAL, CKMB, CKMBINDEX, TROPONINI in the last 72 hours.   FASTING LIPID PANEL:  Lab Results Component Value Date    HDL 49 03/02/2022    LDLCALC 125 03/02/2022    TRIG 101 03/02/2022     LIVER PROFILE:No results for input(s): AST, ALT, LABALBU in the last 72 hours. Past Medical History:   Diagnosis Date    Arthritis     Asthma     Cancer ([facility] 08/2018    Prostrate.      Coronary artery disease 12/17/2018    Diabetes mellitus ([facility]     Hypercholesterolemia 12/17/2018    Hypertension     Hypothyroidism 07/06/2018    Palliative care patient 12/30/2021    Pancreatitis     Rheumatic fever     as child    Systolic congestive heart failure ([facility] 12/17/2018     Past Surgical History:   Procedure Laterality Date    APPENDECTOMY      CHOLECYSTECTOMY      PANCREAS SURGERY      STENT PLACED IN BILE DUCT     Family History   Problem Relation Age of Onset    Cancer Mother     Heart Disease Mother     Diabetes Mother     Tuberculosis Father      Allergies   Allergen Reactions    Phenergan [Promethazine Hcl]      Current Outpatient Medications   Medication Sig Dispense Refill    levothyroxine (SYNTHROID) 50 MCG tablet Take 1 tablet by mouth Daily 30 tablet 0    Probiotic Product (ALIGN) 4 MG CAPS Take 4 mg by mouth daily 30 capsule 0    furosemide (LASIX) 40 MG tablet Take 1 tablet by mouth 2 times daily 60 tablet 3    apixaban (ELIQUIS) 5 MG TABS tablet Take 1 tablet by mouth 2 times daily Resume in 3 days 30 tablet 0    JANUVIA 25 MG tablet Take 25 mg by mouth daily       amiodarone (CORDARONE) 200 MG tablet Take 200 mg by mouth 2 times daily      hydrocortisone 2.5 % cream Apply 1 Pump topically 3 times daily as needed      NARCAN 4 MG/0.1ML LIQD nasal spray       potassium chloride (KLOR-CON M) 20 MEQ extended release tablet Take 1 tablet by mouth 2 times daily 60 tablet 5    carvedilol (COREG) 3.125 MG tablet Take 1 tablet by mouth 2 times daily 60 tablet 5    doxyLAMINE succinate (SLEEP AID) 25 MG tablet Take 25 mg by mouth nightly      clopidogrel (PLAVIX) 75 MG tablet Take 1 tablet by mouth daily 30 tablet 5    aspirin EC 81 MG EC tablet Take 1 tablet by mouth daily 30 tablet 5    lisinopril (PRINIVIL;ZESTRIL) 10 MG tablet lisinopril 10 mg tablet  DAILY       No current facility-administered medications for this visit. Social History     Socioeconomic History    Marital status:      Spouse name: Not on file    Number of children: Not on file    Years of education: Not on file    Highest education level: Not on file   Occupational History    Not on file   Tobacco Use    Smoking status: Never Smoker    Smokeless tobacco: Current User     Types: Chew   Vaping Use    Vaping Use: Never used   Substance and Sexual Activity    Alcohol use: Not Currently     Comment: occ    Drug use: No    Sexual activity: Not on file   Other Topics Concern    Not on file   Social History Narrative    Not on file     Social Determinants of Health     Financial Resource Strain:     Difficulty of Paying Living Expenses: Not on file   Food Insecurity:     Worried About Running Out of Food in the Last Year: Not on file    Yanira of Food in the Last Year: Not on file   Transportation Needs:     Lack of Transportation (Medical): Not on file    Lack of Transportation (Non-Medical):  Not on file   Physical Activity:     Days of Exercise per Week: Not on file    Minutes of Exercise per Session: Not on file   Stress:     Feeling of Stress : Not on file   Social Connections:     Frequency of Communication with Friends and Family: Not on file    Frequency of Social Gatherings with Friends and Family: Not on file    Attends Advent Services: Not on file    Active Member of Clubs or Organizations: Not on file    Attends Club or Organization Meetings: Not on file    Marital Status: Not on file   Intimate Partner Violence:     Fear of Current or Ex-Partner: Not on file    Emotionally Abused: Not on file    Physically Abused: Not on file    Sexually Abused: Not on file   Housing Stability:     Unable to Pay for Housing in the Last Year: Not on file    Number of Places Lived in the Last Year: Not on file    Unstable Housing in the Last Year: Not on file       Physical Examination:  /60   Pulse 61   Ht 5' 10\" (1.778 m)   Wt 140 lb (63.5 kg)   BMI 20.09 kg/m²   Physical Exam  Vitals reviewed. Constitutional:       Appearance: He is well-developed. Neck:      Vascular: No carotid bruit or JVD. Cardiovascular:      Rate and Rhythm: Normal rate and regular rhythm. Heart sounds: Normal heart sounds. No murmur heard. No friction rub. No gallop. Pulmonary:      Effort: Pulmonary effort is normal. No respiratory distress. Breath sounds: Normal breath sounds. No wheezing or rales. Abdominal:      General: There is no distension. Tenderness: There is no abdominal tenderness. Lymphadenopathy:      Cervical: No cervical adenopathy. Skin:     General: Skin is warm and dry. ASSESSMENT:     Diagnosis Orders   1. Paroxysmal atrial fibrillation (HCC)  EKG 12 lead   2. Ischemic cardiomyopathy     3. Coronary artery disease involving native coronary artery of native heart without angina pectoris     4. Essential hypertension     5. Hypercholesterolemia     6. Abnormal nuclear stress test     7. Chronic anticoagulation     8. Chronic combined systolic and diastolic congestive heart failure ([address]         PLAN:  Orders Placed This Encounter   Procedures    EKG 12 lead     No orders of the defined types were placed in this encounter. 1. Continue present medications  2. Recommend follow-up assessment in 3 months    Return in about 3 months (around 8/26/2022) for return to Dr. Lani Mclean only. Kale Membreno MD 5/26/2022 1:13 PM CDT    [facility]      Thisdictation was generated by voice recognition computer software.   Although all attempts are made to edit the dictation for accuracy, there may be errors in the transcription that are not intended.

## 2022-05-26 NOTE — PROGRESS NOTES
ICD interrogated  Presenting rhythm: AS/VS, AP 39.6%,  <0.1%  Battery status: 10.4 years  Lead status: lead impedance within range and stable  Sensing: P waves 1.5 mV,  R waves >20 mV  Thresholds:  Atrial 0.750 V @ 0.4ms, ventricular 1.250 V @ 0.4ms  Observations:  None  Reprogramming for sensitivity and threshold testing  Next [facility] home check scheduled for 08/26/22

## 2022-08-09 NOTE — TELEPHONE ENCOUNTER
From: Aundria Ahumada  To: Office of Dr. De La Fuente Jeanna: 8/9/2022 11:36 AM CDT  Subject: Medication Renewal Request    Refills have been requested for the following medications:     clopidogrel (PLAVIX) 75 MG tablet [Dr. Robin Pierre MD]    Preferred pharmacy: [address] [place] [address]  F [phone]      Medication renewals requested in this message routed separately:     potassium chloride (KLOR-CON M) 20 MEQ extended release tablet LO Abdi]

## 2022-08-11 NOTE — PROGRESS NOTES
Patient Care Team:  LO Garcia - CNP as PCP - General  Brian Ricketts MD as Consulting Physician (Interventional Cardiology)  LO Luther as Advanced Practice Nurse (Neurology)  Augusto Valle DO as Consulting Physician (Neurosurgery)  Mariia Shelley MD as Consulting Physician (Vascular Surgery)      History and Physical  Mr. Morales Curiel is a 76-year-old male who has a past medical history that includes prostate cancer, CAD, diabetes, hyperlipidemia, hypertension, hypothyroidism, congestive heart failure and PVD. Today we are following up for his PVD. Currently he is on aspirin 81 mg and Plavix 75 mg daily. He is on Eliquis 5 mg twice daily. He reports that he was in a skilled nursing facility in February for Rehab. He reports that he has basically been wheelchair bound since. He denies any ischemic rest pain. He does have more peripheral neuropathy in his lower extremities. He has some wounds on his right foot. His daughter thinks that these are healing. He does not smoke.       Arlene Armstrong is a 67 y.o. male with the following history reviewed and recorded in [facility]:  Patient Active Problem List    Diagnosis Date Noted    CHF (congestive heart failure), NYHA class I, acute on chronic, combined ([address] 12/29/2021     Priority: High    Acute on chronic systolic heart failure ([address] 12/12/2021     Priority: High    A-fib ([address] 12/07/2021     Priority: High    Chronic anticoagulation 12/02/2021     Priority: High    Paroxysmal atrial fibrillation ([address] 12/02/2021     Priority: High    Blurred vision, right eye 03/28/2019     Priority: High    Ischemic cardiomyopathy 01/17/2019     Priority: High    Coronary artery disease 12/17/2018     Priority: High    Hypercholesterolemia 12/17/2018     Priority: High    Systolic congestive heart failure ([address] 12/17/2018     Priority: High    Abnormal nuclear stress test 11/26/2018     Priority: High    Colitis 04/26/2022     Priority: Medium Abnormal CT of the abdomen 04/26/2022     Priority: Medium    Palliative care patient 12/30/2021    Ischemic cardiomyopathy with implantable cardioverter-defibrillator (ICD) 12/30/2021    Moderate malnutrition ([facility] 12/30/2021    Hypokalemia 12/13/2021    Nausea vomiting and diarrhea 12/12/2021    Atrial fibrillation with RVR ([address] 12/12/2021    Acute decompensated heart failure ([address] 11/04/2021    Gastro-esophageal reflux disease without esophagitis 10/30/2020    Low back pain 10/30/2020    Malignant neoplasm of prostate ([address] 10/30/2020    Type 2 diabetes mellitus without complications ([facility] 71/22/4837    History of radiation therapy 05/30/2019    BMI 23.0-23.9, adult 01/09/2019    Adenocarcinoma of prostate, stage 2 ([facility] 01/03/2019     Formatting of this note might be different from the original.  Stage IIC (T2c, N0, cM0) [place] 8 Adenocarcinoma of the Prostate with Pre PSA of 7.6      Tobacco abuse 01/03/2019    Diabetes ([facility] 12/20/2018    Family history of cancer 12/20/2018    Essential hypertension 09/24/2018    ESPINOSA (dyspnea on exertion) 09/24/2018    History of diabetes mellitus 25/77/1386    Diastolic dysfunction 82/50/6769    Hypothyroidism 07/06/2018     Current Outpatient Medications   Medication Sig Dispense Refill    levothyroxine (SYNTHROID) 100 MCG tablet       memantine (NAMENDA) 10 MG tablet memantine 10 mg tablet   Take 1 tablet twice a day by oral route for 30 days. potassium chloride (KLOR-CON M) 20 MEQ extended release tablet Take 1 tablet by mouth in the morning and 1 tablet before bedtime. 60 tablet 5    ELIQUIS 5 MG TABS tablet TAKE 1 TABLET BY MOUTH TWO (2) TIMES A DAY 60 tablet 5    clopidogrel (PLAVIX) 75 MG tablet Take 1 tablet by mouth in the morning. 30 tablet 5    carvedilol (COREG) 3.125 MG tablet Take 1 tablet by mouth in the morning and 1 tablet before bedtime.  60 tablet 5    amiodarone (CORDARONE) 200 MG tablet Take 1 tablet by mouth 2 times daily 180 tablet 2 tightness associated with shortness of breath. Cardiovascular - no chest pain, syncope, or significant dizziness. No palpitations. He has bilateral leg swelling. (see HPI)  Gastrointestinal - no abdominal swelling or pain. No blood in stool. No severe constipation, diarrhea, nausea, or vomiting. Genitourinary - No difficulty urinating, dysuria, frequency, or urgency. No flank pain or hematuria. Musculoskeletal - no back pain, gait disturbance, or myalgia. Skin - no rash, pallor. Wounds right foot  Neurologic - no dizziness, facial asymmetry, or light headedness. No seizures. No speech difficulty or lateralizing weakness. Hematologic - no easy bruising or excessive bleeding. Psychiatric - no severe anxiety or nervousness. No confusion. All other review of systems are negative. Physical Exam    /78 (Site: Right Upper Arm, Position: Sitting, Cuff Size: Medium Adult)   Pulse 66   Temp (!) 96.6 °F (35.9 °C) (Oral)   Resp 18   SpO2 99%     Constitutional - well developed, well nourished. No diaphoresis or acute distress. HENT - head normocephalic. Right external ear canal appears normal.  Left external ear canal appears normal.  Septum appears midline. Eyes - conjunctiva normal.  EOMS normal.  No exudate. No icterus. Neck- ROM appears normal, no tracheal deviation. Cardiovascular - Regular rate and rhythm. Heart sounds are normal.  No murmur, rub, or gallop. Carotid pulses are 2+ to palpation bilaterally without bruit. Extremities - Radial and ulnar pulses are 2+ to palpation bilaterally. DP and PT pulses are not palpable. No cyanosis, clubbing, or significant edema. No signs atheroembolic event. Pulmonary - effort appears normal.  No respiratory distress. Lungs - Breath sounds normal. No wheezes or rales. GI - Abdomen - soft, non tender, bowel sounds X 4 quadrants. No guarding or rebound tenderness. No distension or palpable mass.   Genitourinary - deferred. Musculoskeletal - ROM appears normal.  No significant edema. Neurologic - alert and oriented X 3. Physiologic. Skin - warm, dry, and intact. No rash, erythema, or pallor. He has a small wound on his right second toe that is scant. He has 2 small wounds at the base of his toes on his dorsal foot that appear to be healing. There is no signs of cellulitis no drainage or foul odor noted  Psychiatric - mood, affect, and behavior appear normal.  Judgment and thought processes appear normal.    Risk factors for atherosclerosis of all vascular beds have been reviewed with the patient including:  Family history, tobacco abuse in all forms, elevated cholesterol, hyperlipidemia, and diabetes. Lower extremity arterial study: 8/11/22  Right ASHLIE 0.70, Left ASHLIE 1.10. Individual films reviewed: Yes. Test results were reviewed and compared to previous study with decrease in arterial flow on the right. Results were reviewed with the patient. Options were discussed with the patient and his daughter including continued medical management versus proceeding with angiography and potential intervention for PVD with wounds. Risks have been discussed with the patient including but not limited to MI, death, CVA, bleed, nerve injury, infection, contrast nephropathy, possible need for dialysis, and need for further surgery. Since he has significant cardiac issues and feels like the wounds are healing. We will hold off on angiogram at this time. I explained to him that if the wounds do not heal, deteriorated or he develops new wounds he needed to let me know right away as this could be life or limb threatening. They verbalized understanding. Assessment      1.  Athscl native arteries of right leg w ulcer oth prt foot ([address]          Plan      Recommend he continue aspirin 81 mg daily  Plavix and Eliquis per cardiology  He is to wash wounds right foot daily with Dial soap and water and apply topical antibiotic biotic ointment  Recommend good glycemic and blood pressure control  We will follow-up in 6 months with a repeat lower extremity arterial study or sooner if he develops wound deterioration or new wounds or ischemic rest pain.

## 2022-08-14 NOTE — TELEPHONE ENCOUNTER
Daughter said she is not able to get patient in the office today for his wound check. He is in [facility] in [facility]. Called nursing facility to speak with his nurse for the day to see how his wound looks. Bro Wilson it looks great no redness or signs of infection. I will talk with KATHY to see if he wants to give the order to have staples removed.
Mohan Torres requests that Sanjana Velasco return their call in re: to pt.'s appt. today. The best time to reach him is Anytime. Thank you.
Talked with KATHY he give order to remove staples. Called and talked with Ivon at nursing home again and gave orders to remove staples. He is to see us in office Monday PM. She voiced understanding and talked with daughter Jasen Matos as well. \      [place] reached out to Servando Crump at [employer] and she is going to check device either today or tomorrow and carelink box was also ordered today.
97

## 2022-08-25 NOTE — PROGRESS NOTES
ICD interrogated  Presenting rhythm: AS/VS, AP 21%,  <0.1%  Battery status: 10.3 years  Lead status: lead impedance within range and stable  Sensing: P waves 3.3 mV,  R waves >20 mV  Thresholds:  Atrial 1.000 V @ 0.4ms, ventricular 1.000 V @ 0.4ms  Observations:  None  Reprogramming for sensitivity and threshold testing  Next [facility] home check scheduled for 11/30/22

## 2022-08-25 NOTE — PROGRESS NOTES
[facility] Progress Note                            Date:  8/25/2022  Patient: Joo Guy  Age:  67 y. o., 1950      Reason for evaluation:         SUBJECTIVE:    Returns today follow-up assessment coronary artery disease ischemic cardiomyopathy previous ICD placement hypertension paroxysmal atrial fibrillation overall doing reasonably well. On anticoagulation no reported spontaneous bleeding. Denies chest pain dyspnea stable. Device interrogated today functioning appropriately. Blood pressure 100/58 heart rate 70. EKG tracing sinus rhythm first-degree AV block possible old anterior infarct low voltage otherwise unremarkable. Review of Systems   Constitutional: Negative. Negative for chills, fever and unexpected weight change. HENT: Negative. Eyes: Negative. Respiratory: Negative. Negative for shortness of breath. Cardiovascular: Negative. Negative for chest pain. Gastrointestinal: Negative. Negative for diarrhea, nausea and vomiting. Endocrine: Negative. Genitourinary: Negative. Musculoskeletal: Negative. Skin: Negative. Neurological: Negative. All other systems reviewed and are negative. OBJECTIVE:     BP (!) 100/58   Pulse 70   Ht 5' 10\" (1.778 m)   Wt 145 lb (65.8 kg)   BMI 20.81 kg/m²     Labs:   CBC: No results for input(s): WBC, HGB, HCT, PLT in the last 72 hours. BMP:No results for input(s): NA, K, CO2, BUN, CREATININE, LABGLOM, GLUCOSE in the last 72 hours. BNP: No results for input(s): BNP in the last 72 hours. PT/INR: No results for input(s): PROTIME, INR in the last 72 hours. APTT:No results for input(s): APTT in the last 72 hours. CARDIAC ENZYMES:No results for input(s): CKTOTAL, CKMB, CKMBINDEX, TROPONINI in the last 72 hours.   FASTING LIPID PANEL:  Lab Results   Component Value Date/Time    HDL 49 03/02/2022 10:19 AM    LDLCALC 125 03/02/2022 10:19 AM    TRIG 101 03/02/2022 10:19 AM     LIVER PROFILE:No results for input(s): AST, ALT, LABALBU in the last 72 hours. Past Medical History:   Diagnosis Date    Arthritis     Asthma     Cancer ([facility] [address] 08/2018    Prostrate. Coronary artery disease 12/17/2018    Diabetes mellitus ([facility]     Hypercholesterolemia 12/17/2018    Hypertension     Hypothyroidism 07/06/2018    Palliative care patient 12/30/2021    Pancreatitis     Rheumatic fever     as child    Systolic congestive heart failure ([facility] 12/17/2018     Past Surgical History:   Procedure Laterality Date    APPENDECTOMY      CHOLECYSTECTOMY      PANCREAS SURGERY      STENT PLACED IN BILE DUCT     Family History   Problem Relation Age of Onset    Cancer Mother     Heart Disease Mother     Diabetes Mother     Tuberculosis Father      Allergies   Allergen Reactions    Phenergan [Promethazine Hcl]      Current Outpatient Medications   Medication Sig Dispense Refill    HYDROcodone-acetaminophen (NORCO) 7.5-325 MG per tablet Take 7.5 tablets by mouth as needed. gabapentin (NEURONTIN) 100 MG capsule Take 100 mg by mouth 3 times daily as needed. ferrous sulfate (IRON 325) 325 (65 Fe) MG tablet Take 325 mg by mouth daily      Levothyroxine Sodium 200 MCG CAPS Take 100 mcg by mouth Daily      memantine (NAMENDA) 10 MG tablet memantine 10 mg tablet   Take 1 tablet twice a day by oral route for 30 days. potassium chloride (KLOR-CON M) 20 MEQ extended release tablet Take 1 tablet by mouth in the morning and 1 tablet before bedtime. 60 tablet 5    ELIQUIS 5 MG TABS tablet TAKE 1 TABLET BY MOUTH TWO (2) TIMES A DAY 60 tablet 5    clopidogrel (PLAVIX) 75 MG tablet Take 1 tablet by mouth in the morning. 30 tablet 5    carvedilol (COREG) 3.125 MG tablet Take 1 tablet by mouth in the morning and 1 tablet before bedtime.  60 tablet 5    amiodarone (CORDARONE) 200 MG tablet Take 1 tablet by mouth 2 times daily 180 tablet 2    furosemide (LASIX) 40 MG tablet Take 1 tablet by mouth 2 times daily 60 tablet 3    JANUVIA 25 MG tablet Take 25 mg by mouth daily       hydrocortisone 2.5 % cream Apply 1 Pump topically 3 times daily as needed      NARCAN 4 MG/0.1ML LIQD nasal spray       doxyLAMINE succinate (GNP SLEEP AID) 25 MG tablet Take 25 mg by mouth nightly      aspirin EC 81 MG EC tablet Take 1 tablet by mouth daily 30 tablet 5    lisinopril (PRINIVIL;ZESTRIL) 10 MG tablet lisinopril 10 mg tablet  DAILY       No current facility-administered medications for this visit. Social History     Socioeconomic History    Marital status:      Spouse name: Not on file    Number of children: Not on file    Years of education: Not on file    Highest education level: Not on file   Occupational History    Not on file   Tobacco Use    Smoking status: Never    Smokeless tobacco: Former     Types: Chew   Vaping Use    Vaping Use: Never used   Substance and Sexual Activity    Alcohol use: Not Currently     Comment: occ    Drug use: No    Sexual activity: Not on file   Other Topics Concern    Not on file   Social History Narrative    Not on file     Social Determinants of Health     Financial Resource Strain: Not on file   Food Insecurity: Not on file   Transportation Needs: Not on file   Physical Activity: Not on file   Stress: Not on file   Social Connections: Not on file   Intimate Partner Violence: Not on file   Housing Stability: Not on file       Physical Examination:  BP (!) 100/58   Pulse 70   Ht 5' 10\" (1.778 m)   Wt 145 lb (65.8 kg)   BMI 20.81 kg/m²   Physical Exam  Vitals reviewed. Constitutional:       Appearance: He is well-developed. Neck:      Vascular: No carotid bruit or JVD. Cardiovascular:      Rate and Rhythm: Normal rate and regular rhythm. Heart sounds: Normal heart sounds. No murmur heard. No friction rub. No gallop. Pulmonary:      Effort: Pulmonary effort is normal. No respiratory distress. Breath sounds: Normal breath sounds. No wheezing or rales. Abdominal:      General: There is no distension. Tenderness: There is no abdominal tenderness. Lymphadenopathy:      Cervical: No cervical adenopathy. Skin:     General: Skin is warm and dry. ASSESSMENT:     Diagnosis Orders   1. Paroxysmal atrial fibrillation (HCC)  EKG 12 lead      2. Ischemic cardiomyopathy [I25.5 (ICD-10-CM)]        3. Coronary artery disease involving native coronary artery of native heart without angina pectoris        4. Essential hypertension        5. Chronic combined systolic and diastolic congestive heart failure ([address]        6. ESPINOSA (dyspnea on exertion)        7. Chronic anticoagulation        8. Hypercholesterolemia        9. ICD (implantable cardioverter-defibrillator), dual, in situ        10. Abnormal nuclear stress test            PLAN:  Orders Placed This Encounter   Procedures    EKG 12 lead     No orders of the defined types were placed in this encounter. Continue present medications  Recommend follow-up assessment in 6 months    Return in about 6 months (around 2/25/2023) for return to Dr. Sharon Sandoval only. Justine Pratt MD 8/25/2022 11:20 AM CDT    [facility]      Thisdictation was generated by voice recognition computer software. Although all attempts are made to edit the dictation for accuracy, there may be errors in the transcription that are not intended.

## 2022-09-14 NOTE — TELEPHONE ENCOUNTER
Patient's daughter, Devon Corbin, called to reschedule patient's appt with dr Hilary Morejon. Patient is still in the hospital. Please call Devon Corbin at [phone].  Thank you

## 2022-09-22 NOTE — PROGRESS NOTES
Patient Care Team:  LO Epstein - CNP as PCP - General  Lauro England MD as Consulting Physician (Interventional Cardiology)  LO Smith as Advanced Practice Nurse (Neurology)  Vita Hernández DO as Consulting Physician (Neurosurgery)  Darrius Mcarthur MD as Consulting Physician (Vascular Surgery)      History and Physical  Mr. Graham Becerril is a 19-year-old male who has a past medical history that includes prostate cancer, CAD, diabetes, hyperlipidemia, hypertension, hypothyroidism, congestive heart failure and PVD. Today we are following up for his PVD. Currently he is on aspirin 81 mg and Plavix 75 mg daily. He is on Eliquis 2.5 mg twice daily. He reports that he was in a skilled nursing facility in February for Rehab. He reports that he has basically been wheelchair bound since. He denies any ischemic rest pain. He has peripheral neuropathy in his lower extremities. His daughter reports since his last office visit visit he has developed a large wound on his right heel.        Joo Guy is a 67 y.o. male with the following history reviewed and recorded in [facility]:  Patient Active Problem List    Diagnosis Date Noted    CHF (congestive heart failure), NYHA class I, acute on chronic, combined ([address] 12/29/2021     Priority: High    Acute on chronic systolic heart failure ([address] 12/12/2021     Priority: High    A-fib ([address] 12/07/2021     Priority: High    Chronic anticoagulation 12/02/2021     Priority: High    Paroxysmal atrial fibrillation ([address] 12/02/2021     Priority: High    Blurred vision, right eye 03/28/2019     Priority: High    Ischemic cardiomyopathy 01/17/2019     Priority: High    Coronary artery disease 12/17/2018     Priority: High    Hypercholesterolemia 12/17/2018     Priority: High    Systolic congestive heart failure ([address] 12/17/2018     Priority: High    Abnormal nuclear stress test 11/26/2018     Priority: High    Colitis 04/26/2022     Priority: Medium Abnormal CT of the abdomen 04/26/2022     Priority: Medium    Palliative care patient 12/30/2021    Ischemic cardiomyopathy with implantable cardioverter-defibrillator (ICD) 12/30/2021    Moderate malnutrition ([facility] 12/30/2021    Hypokalemia 12/13/2021    Nausea vomiting and diarrhea 12/12/2021    Atrial fibrillation with RVR ([address] 12/12/2021    Acute decompensated heart failure ([address] 11/04/2021    Gastro-esophageal reflux disease without esophagitis 10/30/2020    Low back pain 10/30/2020    Malignant neoplasm of prostate ([facility] 10/30/2020    Type 2 diabetes mellitus without complications ([facility] 65/71/3204    History of radiation therapy 05/30/2019    BMI 23.0-23.9, adult 01/09/2019    Adenocarcinoma of prostate, stage 2 ([facility] [facility] 01/03/2019     Formatting of this note might be different from the original.  Stage IIC (T2c, N0, cM0) [place] 8 Adenocarcinoma of the Prostate with Pre PSA of 7.6      Tobacco abuse 01/03/2019    Diabetes ([facility] 12/20/2018    Family history of cancer 12/20/2018    Essential hypertension 09/24/2018    ESPINOSA (dyspnea on exertion) 09/24/2018    History of diabetes mellitus 14/65/5358    Diastolic dysfunction 98/52/8665    Hypothyroidism 07/06/2018     Current Outpatient Medications   Medication Sig Dispense Refill    HYDROcodone-acetaminophen (NORCO) 7.5-325 MG per tablet Take 7.5 tablets by mouth as needed. gabapentin (NEURONTIN) 100 MG capsule Take 100 mg by mouth 3 times daily as needed. ferrous sulfate (IRON 325) 325 (65 Fe) MG tablet Take 325 mg by mouth daily      Levothyroxine Sodium 200 MCG CAPS Take 100 mcg by mouth Daily      memantine (NAMENDA) 10 MG tablet memantine 10 mg tablet   Take 1 tablet twice a day by oral route for 30 days. potassium chloride (KLOR-CON M) 20 MEQ extended release tablet Take 1 tablet by mouth in the morning and 1 tablet before bedtime.  60 tablet 5    ELIQUIS 5 MG TABS tablet TAKE 1 TABLET BY MOUTH TWO (2) TIMES A DAY 60 tablet 5    clopidogrel (PLAVIX) 75 MG tablet Take 1 tablet by mouth in the morning. 30 tablet 5    carvedilol (COREG) 3.125 MG tablet Take 1 tablet by mouth in the morning and 1 tablet before bedtime. 60 tablet 5    amiodarone (CORDARONE) 200 MG tablet Take 1 tablet by mouth 2 times daily 180 tablet 2    furosemide (LASIX) 40 MG tablet Take 1 tablet by mouth 2 times daily 60 tablet 3    JANUVIA 25 MG tablet Take 25 mg by mouth daily       hydrocortisone 2.5 % cream Apply 1 Pump topically 3 times daily as needed      NARCAN 4 MG/0.1ML LIQD nasal spray       doxyLAMINE succinate (GNP SLEEP AID) 25 MG tablet Take 25 mg by mouth nightly      aspirin EC 81 MG EC tablet Take 1 tablet by mouth daily 30 tablet 5    lisinopril (PRINIVIL;ZESTRIL) 10 MG tablet lisinopril 10 mg tablet  DAILY       No current facility-administered medications for this visit. Allergies: Phenergan [promethazine hcl]  Past Medical History:   Diagnosis Date    Arthritis     Asthma     [facility] 08/2018    Prostrate. Coronary artery disease 12/17/2018    Diabetes mellitus ([facility]     Hypercholesterolemia 12/17/2018    Hypertension     Hypothyroidism 07/06/2018    Palliative care patient 12/30/2021    Pancreatitis     Rheumatic fever     as child    Systolic congestive heart failure ([facility] 12/17/2018     Past Surgical History:   Procedure Laterality Date    APPENDECTOMY      CHOLECYSTECTOMY      PANCREAS SURGERY      STENT PLACED IN BILE DUCT     Family History   Problem Relation Age of Onset    Cancer Mother     Heart Disease Mother     Diabetes Mother     Tuberculosis Father      Social History     Tobacco Use    Smoking status: Never    Smokeless tobacco: Former     Types: Chew   Substance Use Topics    Alcohol use: Not Currently     Comment: occ       Review of Systems    Constitutional - no significant activity change, appetite change, or unexpected weight change. No fever or chills. No diaphoresis or significant fatigue.   HENT - no significant rhinorrhea or epistaxis. No tinnitus or significant hearing loss. Eyes - no sudden vision change or amaurosis. Respiratory - no significant shortness of breath, wheezing, or stridor. No apnea, cough, or chest tightness associated with shortness of breath. Cardiovascular - no chest pain, syncope, or significant dizziness. No palpitations. He has bilateral leg swelling. (see HPI)  Gastrointestinal - no abdominal swelling or pain. No blood in stool. No severe constipation, diarrhea, nausea, or vomiting. Genitourinary - No difficulty urinating, dysuria, frequency, or urgency. No flank pain or hematuria. Musculoskeletal - no back pain, gait disturbance, or myalgia. Skin - no rash, pallor. Wounds right foot. Neurologic - no dizziness, facial asymmetry, or light headedness. No seizures. No speech difficulty or lateralizing weakness. Hematologic - no easy bruising or excessive bleeding. Psychiatric - no severe anxiety or nervousness. No confusion. All other review of systems are negative. Physical Exam    BP (!) 78/54 (Site: Right Upper Arm, Position: Sitting, Cuff Size: Medium Adult)   Pulse 62   Temp (!) 96.6 °F (35.9 °C) (Oral)   Resp 18   SpO2 96%     Constitutional - well developed, well nourished. No diaphoresis or acute distress. HENT - head normocephalic. Right external ear canal appears normal.  Left external ear canal appears normal.  Septum appears midline. Eyes - conjunctiva normal.  EOMS normal.  No exudate. No icterus. Neck- ROM appears normal, no tracheal deviation. Cardiovascular - Regular rate and rhythm. Heart sounds are normal.  No murmur, rub, or gallop. Carotid pulses are 2+ to palpation bilaterally without bruit. Extremities - Radial and ulnar pulses are 2+ to palpation bilaterally. DP and PT pulses are not palpable. Small superficial wound noted at the base of the great toe and medial aspect of the distal right great toe. Right second and fifth toe.   He has a large necrotic wound(dry gangrene) right heel. No drainage or cellulitis noted. No cyanosis, clubbing, or significant edema. No signs atheroembolic event. Pulmonary - effort appears normal.  No respiratory distress. Lungs - Breath sounds normal. No wheezes or rales. GI - Abdomen - soft, non tender, bowel sounds X 4 quadrants. No guarding or rebound tenderness. No distension or palpable mass. Genitourinary - deferred. Musculoskeletal - ROM appears normal.  No significant edema. Neurologic - alert and oriented X 3. Physiologic. Skin - warm, dry, and intact. No rash, erythema, or pallor. He has a small wound on his right second toe that is scant. He has 2 small wounds at the base of his toes on his dorsal foot that appear to be healing. There is no signs of cellulitis no drainage or foul odor noted  Psychiatric - mood, affect, and behavior appear normal.  Judgment and thought processes appear normal.    Risk factors for atherosclerosis of all vascular beds have been reviewed with the patient including:  Family history, tobacco abuse in all forms, elevated cholesterol, hyperlipidemia, and diabetes. Lower extremity arterial study: 8/11/22  Right ASHLIE 0.70, Left ASHLIE 1.10. Individual films reviewed: Yes. Test results were reviewed and compared to previous study with decrease in arterial flow on the right. Results were reviewed with the patient. Options were discussed with the patient and is daughter Ramila Castillo including continued medical management versus proceeding with angiography and potential intervention for PVD with wounds. Patient has opted to proceed with angiography. Risks have been discussed with the patient including but not limited to MI, death, CVA, bleed, nerve injury, infection, contrast nephropathy, possible need for dialysis, and need for further surgery. Discussed case with Dr. Guevara Camejo      1.  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   We will ask cardiology the recommendation and holding Eliquis  He is to wash wounds right foot daily with Dial soap and water and apply betadine to right heel wound and wrap with dry gauze      Proceed with aortogram with runoff possible angioplasty/atherectomy/stent

## 2022-09-23 NOTE — TELEPHONE ENCOUNTER
Patients daughter Good Huang called in to schedule Aortogram with runoff possible angioplasty/atherectomy/stent   Please contact back asap  Thank you

## 2022-09-26 NOTE — TELEPHONE ENCOUNTER
Message left on patient's daughter's voicemail Jamila Valentine) to give me a call back regarding Dr. Cr Gee recommendations to proceed with angiogram.

## 2022-09-27 NOTE — TELEPHONE ENCOUNTER
Patient daughter  Jannet White called to get a date and time for patient surgery.  Please called Jannet White @[phone]      Thank You

## 2022-09-27 NOTE — TELEPHONE ENCOUNTER
The patient's daughter called today to ask when the patient's surgery was scheduled for. I let her know I am waiting on cardiac clearance from Dr. Zee Self to hold the patient's eliquis. I told her I would give her a call back as soon as I had a clearance. She acknowledged.

## 2022-09-30 NOTE — TELEPHONE ENCOUNTER
Luigi's daughter Hugo Esparza called requesting status of their referral for surgery. Per daughter cardiology advised that no clearance has been received for pt. She is concerned tat wound is getting worse and pt may loose his foot if not addressed soon. Please return call to update Hugo Sandhuan on status. The best time to call Hugo Esparza to accommodate their needs is Anytime    Thank you.

## 2022-10-05 NOTE — TELEPHONE ENCOUNTER
Pt's daughter Miguel Hall, presented in the office on behave of the SNF today to ask about the Pt being able to d/c ELIQUIS. Pt is in a SNF, and has recently fell and had to have stitches. The SNF feels that the PT is too big of a fall risk to continue Eliquis 5mg BID, Pt also taking ASA 81mg and Plavix 75mg QD. Please advise.